# Patient Record
Sex: MALE | Race: WHITE | NOT HISPANIC OR LATINO | Employment: OTHER | URBAN - METROPOLITAN AREA
[De-identification: names, ages, dates, MRNs, and addresses within clinical notes are randomized per-mention and may not be internally consistent; named-entity substitution may affect disease eponyms.]

---

## 2018-09-12 ENCOUNTER — TRANSFERRED RECORDS (OUTPATIENT)
Dept: HEALTH INFORMATION MANAGEMENT | Facility: CLINIC | Age: 68
End: 2018-09-12

## 2019-12-16 ENCOUNTER — TRANSFERRED RECORDS (OUTPATIENT)
Dept: HEALTH INFORMATION MANAGEMENT | Facility: CLINIC | Age: 69
End: 2019-12-16

## 2020-06-02 ENCOUNTER — TRANSFERRED RECORDS (OUTPATIENT)
Dept: HEALTH INFORMATION MANAGEMENT | Facility: CLINIC | Age: 70
End: 2020-06-02

## 2021-03-29 ENCOUNTER — TRANSFERRED RECORDS (OUTPATIENT)
Dept: HEALTH INFORMATION MANAGEMENT | Facility: CLINIC | Age: 71
End: 2021-03-29

## 2022-06-25 ENCOUNTER — TRANSFERRED RECORDS (OUTPATIENT)
Dept: HEALTH INFORMATION MANAGEMENT | Facility: CLINIC | Age: 72
End: 2022-06-25

## 2022-07-25 ENCOUNTER — TRANSFERRED RECORDS (OUTPATIENT)
Dept: HEALTH INFORMATION MANAGEMENT | Facility: CLINIC | Age: 72
End: 2022-07-25

## 2022-08-31 ENCOUNTER — TRANSCRIBE ORDERS (OUTPATIENT)
Dept: OTHER | Age: 72
End: 2022-08-31

## 2022-08-31 DIAGNOSIS — R13.19 OTHER DYSPHAGIA: Primary | ICD-10-CM

## 2022-10-18 NOTE — TELEPHONE ENCOUNTER
FUTURE VISIT INFORMATION      FUTURE VISIT INFORMATION:    Date: 1/3/2023    Time: 8 AM     Location: [facility]   REFERRAL INFORMATION:    Referring provider:  Dr. Morenita Justice     Referring providers clinic:  [facility]     Reason for visit/diagnosis: Dysphagia    RECORDS REQUESTED FROM:       Clinic name Comments Records Status Imaging Status   [facility]      1/28/14 note from Ariel Menendez MD    Speech Pathology progress note  6/15/22   11/3/21  3/29/21 Received- Scanned under Media    Imaging XR esophagram swallow study 3/29/21 req 12/20/22 - report sent to scan 12/27/22  Pending req 12/20/22 12/20/22 7:48am Faxed request for swallow study imaging to [facility]

## 2022-12-26 NOTE — TELEPHONE ENCOUNTER
Records Requested   December 26, 2022 7:18 AM  [place] [address]   Facility  [facility]   Outcome Called and sent 2nd request for records and imaging push to  PACS. Provided my call back number -Luz Marina     Records Requested  RECEIVED December 27, 2022 4:30 PM  [place] [address]   Facility  [facility]   Outcome Received report for swallow study and progress notes for SLP. Sent to scanning. -Luz Marina    December 28, 2022 7:02 AM  Called facility film room. Left a message for imaging request previously sent to push to PACS. Left my call back number. -Luz Marina    December 30, 2022 1:08 PM  Check with  PACS and it appears that the imaging was resolved on 12/28/22 but the images are slow to load. An email was sent to to our Pacs Admin to see if the imaging is stuck somewhere in the system. It seem that not all the imaging is loading and the  first 10-11 frames are black.     Called the facility and left another voice message to resend or verify if the beginning is supposed to be blank. Call back number given. -Luz Marina

## 2023-01-03 ENCOUNTER — DOCUMENTATION ONLY (OUTPATIENT)
Dept: OTOLARYNGOLOGY | Facility: CLINIC | Age: 73
End: 2023-01-03

## 2023-01-03 ENCOUNTER — THERAPY VISIT (OUTPATIENT)
Dept: SPEECH THERAPY | Facility: CLINIC | Age: 73
End: 2023-01-03
Payer: COMMERCIAL

## 2023-01-03 ENCOUNTER — PRE VISIT (OUTPATIENT)
Dept: OTOLARYNGOLOGY | Facility: CLINIC | Age: 73
End: 2023-01-03

## 2023-01-03 ENCOUNTER — OFFICE VISIT (OUTPATIENT)
Dept: OTOLARYNGOLOGY | Facility: CLINIC | Age: 73
End: 2023-01-03
Payer: COMMERCIAL

## 2023-01-03 VITALS
BODY MASS INDEX: 40.74 KG/M2 | HEIGHT: 71 IN | SYSTOLIC BLOOD PRESSURE: 162 MMHG | WEIGHT: 291 LBS | DIASTOLIC BLOOD PRESSURE: 102 MMHG | HEART RATE: 71 BPM

## 2023-01-03 DIAGNOSIS — E66.01 MORBID OBESITY (H): ICD-10-CM

## 2023-01-03 DIAGNOSIS — R13.19 OTHER DYSPHAGIA: ICD-10-CM

## 2023-01-03 DIAGNOSIS — R13.12 OROPHARYNGEAL DYSPHAGIA: Primary | ICD-10-CM

## 2023-01-03 PROCEDURE — 92526 ORAL FUNCTION THERAPY: CPT | Mod: GN | Performed by: SPEECH-LANGUAGE PATHOLOGIST

## 2023-01-03 PROCEDURE — 99207 PR NO CHARGE LOS: CPT

## 2023-01-03 PROCEDURE — 99204 OFFICE O/P NEW MOD 45 MIN: CPT | Mod: 25 | Performed by: OTOLARYNGOLOGY

## 2023-01-03 PROCEDURE — 92610 EVALUATE SWALLOWING FUNCTION: CPT | Mod: GN | Performed by: SPEECH-LANGUAGE PATHOLOGIST

## 2023-01-03 PROCEDURE — 92613 ENDOSCOPY SWALLOW (FEES) I&R: CPT | Performed by: OTOLARYNGOLOGY

## 2023-01-03 PROCEDURE — 92612 ENDOSCOPY SWALLOW (FEES) VID: CPT | Mod: GN | Performed by: OTOLARYNGOLOGY

## 2023-01-03 RX ORDER — METOPROLOL TARTRATE 100 MG
100 TABLET ORAL 2 TIMES DAILY
COMMUNITY

## 2023-01-03 RX ORDER — LOSARTAN POTASSIUM 100 MG/1
100 TABLET ORAL DAILY
COMMUNITY
End: 2023-04-14

## 2023-01-03 ASSESSMENT — PAIN SCALES - GENERAL: PAINLEVEL: NO PAIN (0)

## 2023-01-03 NOTE — PROGRESS NOTES
Video Esophagram Review Rounds  Imaging Review of MBSS conducted with attending physician Christina Ang and reviewed/discussed with SLP Sade Blair, Kalli Oneal, Liliya Oleary, and/or Tiny Calderon    Relevant Background:      MBSS Date: 3/2021    Findings:  Pharyngeal Weakness:No  Epiglottic dysfunction: Yes  Penetration: Yes  Aspiration: No  UES dysfunction: No  Details: osteophyte with epiglottic dysfunction       Recommendations:  Diet: current   Needs repeat Xray Video Swallow Exam

## 2023-01-03 NOTE — PROGRESS NOTES
[facility]    Patient: Bird Kaur  Date of Service: 1/3/2023    HISTORY  PATIENT INFORMATION  Bird Kaur was seen for brief consultation in conjunction with a visit to Dr. Ang today.  It was determined that no swallowing assessment was indicated.    No charge for today s session;   NO CHARGE FACILITY FEE (68039)    Bernard Naylor, Ph.D., CCC-SLP  , Otolaryngology  Speech-Language Pathologist-[facility]  [phone]  he/him/his

## 2023-01-03 NOTE — PROGRESS NOTES
[facility]   at the [facility]   Otolaryngology Clinic     Patient: Bird Kaur    MRN: [id number]    : 1950    Age/Gender: 72 year old male  Date of Service: 1/3/2023  Rendering Provider:   Christina Ang MD     Referring Provider   PCP: No primary care provider on file.  Referring Physician: Dr. Morenita Justice     Reason for Consultation   Dysphagia  History   HISTORY OF PRESENT ILLNESS: Mr. Kaur is a 72 year old male who presents to us today with dysphagia.      Of note he has a large anterior osteophyte at the C3-4 level 3-4 years ago. He is to undergo cervical spinal surgery in February.     he presents today for evaluation with his wife Tatyana. he reports:    Dysphonia  - used to be a rodriguez   - no longer able to sing  - history of voice therapy    Dysphagia  - ongoing swallow issues for some time  - cold water hurts the posterior neck  - soft foods such as pudding get stuck in the throat  - upcoming cervical surgery in February for arthritis vs complications of his spur on swallowing  - limited range of motion   - residual right ulnar nerve pain   - his wife reports that he gags 3-4 x every meal   - did have swallow and voice therapy in [place]   - he is physically unable to tuck his chin due to pain  - does report systemic weakness and tremor  - has been seen by neurology for this  - denies recent pneumonias   - had Covid-19 in November  - history of sinus problems  - history of asthma with seasonal allergies  - uses Flonase    Dyspnea  - denies    Throat clearing/cough  - clears throat around and after meals    GERD/LPRD   - denies   - was on PPI prophylactic but discontinued    PAST MEDICAL HISTORY: No past medical history on file.    PAST SURGICAL HISTORY: No past surgical history on file.    CURRENT MEDICATIONS: No current outpatient medications on file.    ALLERGIES: Patient has no allergy information on record.    SOCIAL HISTORY:    Social History      Socioeconomic History     Marital status: Patient Declined     Spouse name: Not on file     Number of children: Not on file     Years of education: Not on file     Highest education level: Not on file   Occupational History     Not on file   Tobacco Use     Smoking status: Not on file     Smokeless tobacco: Not on file   Substance and Sexual Activity     Alcohol use: Not on file     Drug use: Not on file     Sexual activity: Not on file   Other Topics Concern     Not on file   Social History Narrative     Not on file     Social Determinants of Health     Financial Resource Strain: Not on file   Food Insecurity: Not on file   Transportation Needs: Not on file   Physical Activity: Not on file   Stress: Not on file   Social Connections: Not on file   Intimate Partner Violence: Not on file   Housing Stability: Not on file         FAMILY HISTORY: No family history on file.  Non-contributory for problems with anesthesia    REVIEW OF SYSTEMS:   The patient was asked a 14 point review of systems regarding constitutional symptoms, eye symptoms, ears, nose, mouth, throat symptoms, cardiovascular symptoms, respiratory symptoms, gastrointestinal symptoms, genitourinary symptoms, musculoskeletal symptoms, integumentary symptoms, neurological symptoms, psychiatric symptoms, endocrine symptoms, hematologic/lymphatic symptoms, and allergic/ immunologic symptoms.   The pertinent factors have been included in the HPI and below.  Patient Supplied Answers to Review of Systems  No flowsheet data found.    Physical Examination   The patient underwent a physical examination as described below. The pertinent positive and negative findings are summarized after the description of the examination.  Constitutional: The patient's developmental and nutritional status was assessed. The patient's voice quality was assessed.  Head and Face: The head and face were inspected for deformities. The sinuses were palpated. The salivary glands were  palpated. Facial muscle strength was assessed bilaterally.  Eyes: Extraocular movements and primary gaze alignment were assessed.  Ears, Nose, Mouth and Throat: The ears and nose were examined for deformities. The nasal septum, mucosa, and turbinates were inspected by anterior rhinoscopy. The lips, teeth, and gums were examined for abnormalities. The oral mucosa, tongue, palate, tonsils, lateral and posterior pharynx were inspected for the presence of asymmetry or mucosal lesions.    Neck: The tracheal position was noted, and the neck mass palpated to determine if there were any asymmetries, abnormal neck masses, thyromegally, or thyroid nodules.  Respiratory: The nature of the breathing and chest expansion/symmetry was observed.  Cardiovascular: The patient was examined to determine the presence of any edema or jugular venous distension.  Abdomen: The contour of the abdomen was noted.  Lymphatic: The patient was examined for infraclavicular lymphadenopathy.  Musculoskeletal: The patient was inspected for the presence of skeletal deformities.  Extremities: The extremities were examined for any clubbing or cyanosis.  Skin: The skin was examined for inflammatory or neoplastic conditions.  Neurologic: The patient's orientation, mood, and affect were noted. The cranial nerve  functions were examined.  Other pertinent positive and negative findings on physical examination:      OC/OP: no lesions, uvula midline, soft palate elevates symmetrically   Neck: no lesions, no TH tenderness to palpation  All other physical examination findings were within normal limits and noncontributory.  Procedures   Flexible laryngoscopy (CPT 96721)      Pre-procedure diagnosis: Dysphagia  Post-procedure diagnosis: same as above  Indication for procedure: Mr. Kaur is a 72 year old male with see above  Procedure(s): Fiberoptic Laryngoscopy    Details of Procedure: After informed consent was obtained, the patient was seated in the  examination chair.  The areas of the nasopharynx as well as the hypopharynx were anesthetized with topical 4% lidocaine with 0.25% phenylephrine atomizer.  Examination of the base of tongue was performed first.  Attention was directed to any evidence of masses in the area or evidence of leukoplakia or candidal infection.  Attention was directed to the epiglottis where its size and position was determined and its movement on phonation of the vowel  e .  The piriform sinuses were then inspected for any mass lesions or pooling of secretions.  Attention was then directed to the larynx. The vocal folds were inspected for infection or any areas of leukoplakia, for masses, polypoid degeneration, or hemorrhage.  Having done this, the arytenoids and vocal processes were inspected for erythema or evidence of granuloma formation.  The posterior commissure was then inspected for evidence of inflammatory changes in the mucosa and heaping up of mucosal tissue. The patient was then instructed to say the vowel  e .  Adduction of vocal folds to the midline was observed for any evidence of paresis or paralysis of the larynx or asymmetry in rotation of the larynx to the left or right. The patient was asked to breathe and the degree of abduction was noted bilaterally.  Subglottic view of the larynx was obtained for any additional mass lesions or mucosal changes.  Finally the post cricoid was examined for evidence of pooling of secretions, as well as the pharyngeal wall mucosa.   Anesthesia type: 0.25% phenylephrine    Findings:  Anatomic/physiological deviations: RNC, osteophyte above the arytenoids, no pooling of saliva, dry secretions   Right vocal process: No restriction of mobility   Left vocal process: No restriction of mobility  Glottal gap: Complete glottal closure  Supraglottic structures: Normal  Hypopharynx: Normal     Estimated Blood Loss: minimal  Complications: None  Disposition: Patient tolerated the procedure well            Fiberoptic Endoscopic Evaluation of Swallowing (CPT 20871)  and Interpretation of Swallowing (CPT 94908)    Indications: See above notes for complete history and physical. Patient complains of dysphagia to both solids and liquids and/or there is suggestion on history and endoscopic exam of the presence of dysphagia causing medical complaints.  Swallowing evaluation is being performed to assess the presence and degree of dysphagia, and to recommend a safe diet.     Pulmonary Status:  No PNA   Current Diet:              regular                                        thin liquids      Consistency Amounts:  Thin Liquid: sip   Puree: 1 tsp  Solid: cookies            Positioning: upright in a chair  Oral Peripheral Exam: See physical exam section.  Anatomic Notes: See Videostroboscopy report for assessment of anatomy and laryngeal functioning  Pharyngeal secretions prior to administration of liquid or food: Yes  Oral Phase Abnormal Findings: No abnormal behavior observed  Behavioral Adaptations: No abnormal behavior observed  Pharyngeal Phase Abnormal Findings: penetration with thins, mild pharyngeal wall residue with puree and solids , with significant throat clearing throughout the entire exam    Recommended Diet:  regular                                        thin liquids              Review of Relevant Clinical Data   I personally reviewed:  Notes:   [facility]          Radiology:   The images were reviewed and interpreted of Xray Video Swallow Exam  3/29/21          Procedures:     Labs:  No results found for: TSH  No results found for: NA, CO2, BUN, CREAT, GLUCOSE, PHOS  No results found for: WBC, HGB, HCT, MCV, PLT  No results found for: PT, PTT, INR  No results found for: INDU  No components found for: RHEUMATOIDFACTOR,  RF  No results found for: CRP  No components found for: CKTOT, URICACID  No components found for: C3, C4, DSDNAAB, NDNAABIFA  No results found for: MPOAB    Patient reported  Quality of Life (QOL) Measures   Patient Supplied Answers To VHI Questionnaire  No flowsheet data found.      Patient Supplied Answers To EAT Questionnaire  No flowsheet data found.      Patient Supplied Answers To CSI Questionnaire  No flowsheet data found.      Patient Supplied Answers to Dyspnea Index Questionnaire:  No flowsheet data found.    Impression & Plan     IMPRESSION: Mr. Kaur is a 72 year old male who is being seen for the followin. 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 Erwin's office  - throat clearing likely due to some hypersensitivity with swallowing dysfunction as given today it only started after he started eating   - will need to [facility] to discuss his case further prior to surgery   Plan  - Xray Video Swallow Exam   - obtain information of [facility], wife will call me with name  - swallow therapy     RETURN VISIT: after testing    Thank you for the kind referral and for allowing me to share in the care of Mr. Kaur. If you have any questions, please do not hesitate to  contact me.    Scribe Disclosure:  I, Deion Pineda, am serving as a scribe to document services personally performed by Christina Ang MD based on data collection and the provider's statements to me.     Christina Ang MD    Laryngology    [facility]  Department of  Otolaryngology - Head and Neck Surgery  Clinics & Surgery Center  [address] [place] [id number]  Appointment line: [phone]  Fax: [phone]  [email or web address]

## 2023-01-03 NOTE — LETTER
1/3/2023       RE: Bird Kaur  [address] [address]     Dear Colleague,    Thank you for referring your patient, Bird Kaur, to the [facility] EAR NOSE AND THROAT [facility]INIC [place] at [facility]. Please see a copy of my visit note below.        [facility]   at the [facility]   Otolaryngology Clinic     Patient: Bird Kaur    MRN: [id number]    : 1950    Age/Gender: 72 year old male  Date of Service: 1/3/2023  Rendering Provider:   Christina Ang MD     Referring Provider   PCP: No primary care provider on file.  Referring Physician: Dr. Morenita Justice     Reason for Consultation   Dysphagia  History   HISTORY OF PRESENT ILLNESS: Mr. Kaur is a 72 year old male who presents to us today with dysphagia.      Of note he has a large anterior osteophyte at the C3-4 level 3-4 years ago. He is to undergo cervical spinal surgery in February.     he presents today for evaluation with his wife Tatyana. he reports:    Dysphonia  - used to be a rodriguez   - no longer able to sing  - history of voice therapy    Dysphagia  - ongoing swallow issues for some time  - cold water hurts the posterior neck  - soft foods such as pudding get stuck in the throat  - upcoming cervical surgery in February for arthritis vs complications of his spur on swallowing  - limited range of motion   - residual right ulnar nerve pain   - his wife reports that he gags 3-4 x every meal   - did have swallow and voice therapy in [place]   - he is physically unable to tuck his chin due to pain  - does report systemic weakness and tremor  - has been seen by neurology for this  - denies recent pneumonias   - had Covid-19 in November  - history of sinus problems  - history of asthma with seasonal allergies  - uses Flonase    Dyspnea  - denies    Throat clearing/cough  - clears throat around and after meals    GERD/LPRD   -  denies   - was on PPI prophylactic but discontinued    PAST MEDICAL HISTORY: No past medical history on file.    PAST SURGICAL HISTORY: No past surgical history on file.    CURRENT MEDICATIONS: No current outpatient medications on file.    ALLERGIES: Patient has no allergy information on record.    SOCIAL HISTORY:    Social History     Socioeconomic History     Marital status: Patient Declined     Spouse name: Not on file     Number of children: Not on file     Years of education: Not on file     Highest education level: Not on file   Occupational History     Not on file   Tobacco Use     Smoking status: Not on file     Smokeless tobacco: Not on file   Substance and Sexual Activity     Alcohol use: Not on file     Drug use: Not on file     Sexual activity: Not on file   Other Topics Concern     Not on file   Social History Narrative     Not on file     Social Determinants of Health     Financial Resource Strain: Not on file   Food Insecurity: Not on file   Transportation Needs: Not on file   Physical Activity: Not on file   Stress: Not on file   Social Connections: Not on file   Intimate Partner Violence: Not on file   Housing Stability: Not on file         FAMILY HISTORY: No family history on file.  Non-contributory for problems with anesthesia    REVIEW OF SYSTEMS:   The patient was asked a 14 point review of systems regarding constitutional symptoms, eye symptoms, ears, nose, mouth, throat symptoms, cardiovascular symptoms, respiratory symptoms, gastrointestinal symptoms, genitourinary symptoms, musculoskeletal symptoms, integumentary symptoms, neurological symptoms, psychiatric symptoms, endocrine symptoms, hematologic/lymphatic symptoms, and allergic/ immunologic symptoms.   The pertinent factors have been included in the HPI and below.  Patient Supplied Answers to Review of Systems  No flowsheet data found.    Physical Examination   The patient underwent a physical examination as described below. The pertinent  positive and negative findings are summarized after the description of the examination.  Constitutional: The patient's developmental and nutritional status was assessed. The patient's voice quality was assessed.  Head and Face: The head and face were inspected for deformities. The sinuses were palpated. The salivary glands were palpated. Facial muscle strength was assessed bilaterally.  Eyes: Extraocular movements and primary gaze alignment were assessed.  Ears, Nose, Mouth and Throat: The ears and nose were examined for deformities. The nasal septum, mucosa, and turbinates were inspected by anterior rhinoscopy. The lips, teeth, and gums were examined for abnormalities. The oral mucosa, tongue, palate, tonsils, lateral and posterior pharynx were inspected for the presence of asymmetry or mucosal lesions.    Neck: The tracheal position was noted, and the neck mass palpated to determine if there were any asymmetries, abnormal neck masses, thyromegally, or thyroid nodules.  Respiratory: The nature of the breathing and chest expansion/symmetry was observed.  Cardiovascular: The patient was examined to determine the presence of any edema or jugular venous distension.  Abdomen: The contour of the abdomen was noted.  Lymphatic: The patient was examined for infraclavicular lymphadenopathy.  Musculoskeletal: The patient was inspected for the presence of skeletal deformities.  Extremities: The extremities were examined for any clubbing or cyanosis.  Skin: The skin was examined for inflammatory or neoplastic conditions.  Neurologic: The patient's orientation, mood, and affect were noted. The cranial nerve  functions were examined.  Other pertinent positive and negative findings on physical examination:      OC/OP: no lesions, uvula midline, soft palate elevates symmetrically   Neck: no lesions, no TH tenderness to palpation  All other physical examination findings were within normal limits and noncontributory.  Procedures    Flexible laryngoscopy (CPT 76657)      Pre-procedure diagnosis: Dysphagia  Post-procedure diagnosis: same as above  Indication for procedure: Mr. Kaur is a 72 year old male with see above  Procedure(s): Fiberoptic Laryngoscopy    Details of Procedure: After informed consent was obtained, the patient was seated in the examination chair.  The areas of the nasopharynx as well as the hypopharynx were anesthetized with topical 4% lidocaine with 0.25% phenylephrine atomizer.  Examination of the base of tongue was performed first.  Attention was directed to any evidence of masses in the area or evidence of leukoplakia or candidal infection.  Attention was directed to the epiglottis where its size and position was determined and its movement on phonation of the vowel  e .  The piriform sinuses were then inspected for any mass lesions or pooling of secretions.  Attention was then directed to the larynx. The vocal folds were inspected for infection or any areas of leukoplakia, for masses, polypoid degeneration, or hemorrhage.  Having done this, the arytenoids and vocal processes were inspected for erythema or evidence of granuloma formation.  The posterior commissure was then inspected for evidence of inflammatory changes in the mucosa and heaping up of mucosal tissue. The patient was then instructed to say the vowel  e .  Adduction of vocal folds to the midline was observed for any evidence of paresis or paralysis of the larynx or asymmetry in rotation of the larynx to the left or right. The patient was asked to breathe and the degree of abduction was noted bilaterally.  Subglottic view of the larynx was obtained for any additional mass lesions or mucosal changes.  Finally the post cricoid was examined for evidence of pooling of secretions, as well as the pharyngeal wall mucosa.   Anesthesia type: 0.25% phenylephrine    Findings:  Anatomic/physiological deviations: RNC, osteophyte above the arytenoids, no pooling of  saliva, dry secretions   Right vocal process: No restriction of mobility   Left vocal process: No restriction of mobility  Glottal gap: Complete glottal closure  Supraglottic structures: Normal  Hypopharynx: Normal     Estimated Blood Loss: minimal  Complications: None  Disposition: Patient tolerated the procedure well           Fiberoptic Endoscopic Evaluation of Swallowing (CPT 36804)  and Interpretation of Swallowing (CPT 44384)    Indications: See above notes for complete history and physical. Patient complains of dysphagia to both solids and liquids and/or there is suggestion on history and endoscopic exam of the presence of dysphagia causing medical complaints.  Swallowing evaluation is being performed to assess the presence and degree of dysphagia, and to recommend a safe diet.     Pulmonary Status:  No PNA   Current Diet:              regular                                        thin liquids      Consistency Amounts:  Thin Liquid: sip   Puree: 1 tsp  Solid: cookies            Positioning: upright in a chair  Oral Peripheral Exam: See physical exam section.  Anatomic Notes: See Videostroboscopy report for assessment of anatomy and laryngeal functioning  Pharyngeal secretions prior to administration of liquid or food: Yes  Oral Phase Abnormal Findings: No abnormal behavior observed  Behavioral Adaptations: No abnormal behavior observed  Pharyngeal Phase Abnormal Findings: penetration with thins, mild pharyngeal wall residue with puree and solids , with significant throat clearing throughout the entire exam    Recommended Diet:  regular                                        thin liquids              Review of Relevant Clinical Data   I personally reviewed:  Notes:   [facility]          Radiology:   The images were reviewed and interpreted of Xray Video Swallow Exam  3/29/21          Procedures:     Labs:  No results found for: TSH  No results found for: NA, CO2, BUN, CREAT, GLUCOSE, PHOS  No  results found for: WBC, HGB, HCT, MCV, PLT  No results found for: PT, PTT, INR  No results found for: INDU  No components found for: RHEUMATOIDFACTOR,  RF  No results found for: CRP  No components found for: CKTOT, URICACID  No components found for: C3, C4, DSDNAAB, NDNAABIFA  No results found for: MPOAB    Patient reported Quality of Life (QOL) Measures   Patient Supplied Answers To VHI Questionnaire  No flowsheet data found.      Patient Supplied Answers To EAT Questionnaire  No flowsheet data found.    Patient Supplied Answers To CSI Questionnaire  No flowsheet data found.    Patient Supplied Answers to Dyspnea Index Questionnaire:  No flowsheet data found.    Impression & Plan     IMPRESSION: Mr. Kaur is a 72 year old male who is being seen for the followin. 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 Erwin's office  - throat clearing likely due to some hypersensitivity with swallowing dysfunction as given today it only started after he started eating   - will need to [facility] to discuss his case  further prior to surgery   Plan  - Xray Video Swallow Exam   - [facility], wife will call me with name  - swallow therapy     RETURN VISIT: after testing    Thank you for the kind referral and for allowing me to share in the care of Mr. Kaur. If you have any questions, please do not hesitate to contact me.    Scribe Disclosure:  I, Deion Pineda, am serving as a scribe to document services personally performed by Christina Ang MD based on data collection and the provider's statements to me.       Again, thank you for allowing me to participate in the care of your patient.      Sincerely,    Christina Ang MD

## 2023-01-03 NOTE — PROGRESS NOTES
OUTPATIENT SWALLOW  EVALUATION  PLAN OF TREATMENT FOR OUTPATIENT REHABILITATION  (COMPLETE FOR INITIAL CLAIMS ONLY)  Patient's Last Name, First Name, M.I.  YOB: 1950  Bird Kaur     Provider's Name   FEDE Jean Baptiste   Medical Record No.  [id number]     Start of Care Date:  01/03/23   Onset Date:  01/03/23   Type:     ___PT   ____OT  ___X_SLP Medical Diagnosis:  Dysphagia     Treatment Diagnosis:  Mild to moderate oropharyngeal dysphagia Visits from SOC:  1     _________________________________________________________________________________  Plan of Treatment/Functional Goals:  Planned Therapy Interventions: Dysphagia Treatment  Dysphagia treatment: Oropharyngeal exercise training, Instruction of safe swallow strategies          Goals   1. Goal Identifier: 1       Goal Description: Pt will independently complete oropharyngeal strengthening exercises in sets of ten 3-5x/day.       Target Date: 04/03/23           2. Goal Identifier: 2       Goal Description: Pt will independently implement aspiration precautions and safe swallow strategies on 4 out of 5 opportunities.       Target Date: 04/03/23           3. Goal Identifier: 3       Goal Description: Pt will complete a VFSS before 2/3/2023.       Target Date: 04/03/23                  Therapy Frequency: other (see comments) (Monthly)  Predicted Duration of Therapy Intervention (days/wks): up to 3 months    Kalli Oneal, SLP       I CERTIFY THE NEED FOR THESE SERVICES FURNISHED UNDER        THIS PLAN OF TREATMENT AND WHILE UNDER MY CARE     (Physician attestation of this document indicates review and certification of the therapy plan).                  Certification date from: 01/03/23 Certification date to: 04/03/23          Referring Physician: Christina Ang MD    Initial Assessment        See Epic Evaluation Start Of Care Date: 01/03/23

## 2023-01-03 NOTE — PROGRESS NOTES
Speech-Language Pathology Department   EVALUATION  [facility]  CLINICAL SWALLOW EVALUATION WITH ENDOSCOPIC VISUALIZATION VIA MD SCOPE      01/03/23 0700       Present No   General Information   Type Of Visit Initial   Start Of Care Date 01/03/23   Referring Physician Christina Ang MD   Orders Evaluate And Treat   Orders Comment Clinical swallow study   Medical Diagnosis Dysphagia   Onset Of Illness/injury Or Date Of Surgery 01/03/23   Precautions/limitations No Known Precautions/limitations   Hearing Sensorineural loss noted but WFL for conversational speech production   Pertinent History of Current Problem/OT: Additional Occupational Profile Info Pt is a 72 year old male with a PMH including dysphagia, asthma, sleep apnea, obsety, chronic and allergic rhinitis who presents to ENT clinic due to difficulty with swallowing.  Pt has had VFSS x2 in 2021 resulting in the recommendation for a regular texture diet and thin liquids.  Of note, a large osteophyte was observed at C3-C4 and reported to limit epiglottic inversion.  Pt reports this date that he will be having surgery in early February which he and his wife report will address the bone spurs in his neck.  Pt reports worsening swallow function.  He reports a sense of things getting stuck; pt notes that this can occur on anything.  Wife reports that he gags and chokes 3-4x/meal and that this occurs every meal.  Pt denies PNA.  Pt denies reflux but does note that he is on reflux medicaiton.   Respiratory Status Room air   Prior Level Of Function Swallowing   Prior Level Of Function Comment Regular textures and thin liquids   Living Environment [facility]   General Observations Pt is pleasant and cooperative.   Patient/family Goals Goals not formally stated at the time of evaluation.   General Information Comments Clinical swallow evaluation completed with endoscopic visualization via MD  scope.   Clinical Swallow Evaluation   Oral Musculature generally intact   Structural Abnormalities none present   Dentition present and adequate   Mucosal Quality adequate   Oral Labial Strength and Mobility WFL   Lingual Strength and Mobility WFL   Laryngeal Function Cough;Throat clear;Swallow;Voicing initiated   Additional Documentation Yes   Additional evaluation(s) completed today Recommended   Rationale for completing additional evaluation View pharyngeal phase and r/o aspiration in the setting of penetration and residue.   Swallow Eval   Feeding Assistance no assistance needed   Clinical Swallow Eval: Thin Liquid Texture Trial   Mode of Presentation, Thin Liquids cup;self-fed;straw   Volume of Liquid or Food Presented 2 oz thin liquid   Oral Phase of Swallow WFL   Pharyngeal Phase of Swallow feeling of something stuck in throat;repeated swallows;throat clearing;impaired   Successful Strategies Trialed During Procedure other (see comments)  (Unable to tuck chin due to prior surgery)   Diagnostic Statement Penetration resulting in immediate coughing and throat clearing which cleared the laryngeal vestibule.  No aspiration.  Mild residue remaining in the valleculae and lateral chanels.  Of note, pt denies a sense of things going down the wrong way or remaining in the throat but that the liquid is trying to go up his nose.   Clinical Swallow Eval: Puree Solid Texture Trial   Mode of Presentation, Puree spoon;self-fed   Volume of Puree Presented 1 tbsp bites x2   Oral Phase, Puree Premature pharyngeal entry   Pharyngeal Phase, Puree impaired;repeated swallows;throat clearing   Diagnostic Statement Penetration.  No aspiration.  Moderate residue remaining in the valleculae, lateral chanels and along the phayngeal wall.  Excessive throat clearing and pt noted to elicit multiple swallows in attempt to clear.   Clinical Swallow Eval: Regular (Solid)   Mode of Presentation self-fed   Volume Presented 1 Jeny Dodayami  cookie   Oral Phase Residue in oral cavity;Premature pharyngeal entry   Pharyngeal Phase feeling of something stuck in throat;repeated swallows;throat clearing;impaired   Diagnostic Statement No penetration or aspiration. Mild pharyngeal residue in the valleculae and pharyngeal wall.  Throat clearing elicited.   Swallow Compensations   Swallow Compensations Pacing;Reduce amounts;Alternate viscosity of consistencies   Educational Assessment   Barriers to Learning No barriers   Esophageal Phase of Swallow   Patient reports or presents with symptoms of esophageal dysphagia No   General Therapy Interventions   Planned Therapy Interventions Dysphagia Treatment   Dysphagia treatment Oropharyngeal exercise training;Instruction of safe swallow strategies   Swallow Eval: Clinical Impressions   Skilled Criteria for Therapy Intervention Skilled criteria met.  Treatment indicated.   Functional Assessment Scale (FAS) 4   Treatment Diagnosis Mild to moderate oropharyngeal dysphagia   Diet texture recommendations Regular diet;Thin liquids (level 0)   Recommended Feeding/Eating Techniques alternate between small bites and sips of food/liquid;maintain upright posture during/after eating for 30 mins;small sips/bites   Rehab Potential good, to achieve stated therapy goals   Therapy Frequency other (see comments)  (Monthly)   Predicted Duration of Therapy Intervention (days/wks) up to 3 months   Anticipated Discharge Disposition home   Risks and Benefits of Treatment have been explained. Yes   Patient, family and/or staff in agreement with Plan of Care Yes   Clinical Impression Comments Pt presents with mild to moderate oropharyngeal dysphagia in the setting of weakness, osteophyte obstruction, and hypersensitivity.  Pt demonstrated penetration on thin liquids and pureed textures.  Pt was able to clear penetrated material from the laryngeal vestibule and there was no aspiration.  Pt exhibited mild to moderate pharyngeal residue which  resulted in excessive throat clearing and multiple swallows.  At this time, pt is at increased risk for aspiration due to current level of swallow skills.  Pt is also planning to undergo a surgery in early February for the managment of cerivical osteophytes which will likely further impact his swallow function.  It is recommended that pt cautiously continue with a regular texture diet and thin liquids.  Pt should be upright for intake and pace self, take small bites/sips, and alternate consistencies.  Pt should also participate in pharyngeal strengthening exercises to maximize safety and ease of swallowing in the setting of upcoming surgery.   Swallow Goals   SLP Swallow Goals 1;2;3   Swallow Goal 1   Goal Identifier 1   Goal Description Pt will independently complete oropharyngeal strengthening exercises in sets of ten 3-5x/day.   Target Date 04/03/23   Swallow Goal 2   Goal Identifier 2   Goal Description Pt will independently implement aspiration precautions and safe swallow strategies on 4 out of 5 opportunities.   Target Date 04/03/23   Swallow Goal 3   Goal Identifier 3   Goal Description Pt will complete a VFSS before 2/3/2023.   Target Date 04/03/23   Total Session Time   SLP Eval: oral/pharyngeal swallow function, clinical minutes (83999) 30   Total Evaluation Time 30   Therapy Certification   Certification date from 01/03/23   Certification date to 04/03/23   Medical Diagnosis Dysphagia   Certification I certify the need for these services furnished under this plan of treatment and while under my care.  (Physician co-signature of this document indicates review and certification of the therapy plan).     Thank you for the referral of Bird Kaur.  If you have any questions about this report, please contact me using the information below.        Kalli Oneal MS CCC-SLP  Speech Language Pathologist   Clinical Specialist Level 3    [facility]  Dept. of  Otolaryngology  Department of Rehabilitation services  [address] [place] [address]    Email: [email or web address]  Voicemail: [phone]  Gender Pronouns: she/her  Employed by [employer]

## 2023-01-03 NOTE — PATIENT INSTRUCTIONS
1. Please follow-up with an Xray video swallow study   2. Please call the ENT clinic with any questions,concerns, new or worsening symptoms.    -Clinic number is [phone]   - [facility] direct line (Dr. Ang's nurse) [phone]

## 2023-01-12 ENCOUNTER — ANCILLARY PROCEDURE (OUTPATIENT)
Dept: GENERAL RADIOLOGY | Facility: CLINIC | Age: 73
End: 2023-01-12
Attending: OTOLARYNGOLOGY
Payer: COMMERCIAL

## 2023-01-12 ENCOUNTER — THERAPY VISIT (OUTPATIENT)
Dept: SPEECH THERAPY | Facility: CLINIC | Age: 73
End: 2023-01-12
Payer: COMMERCIAL

## 2023-01-12 ENCOUNTER — DOCUMENTATION ONLY (OUTPATIENT)
Dept: OTOLARYNGOLOGY | Facility: CLINIC | Age: 73
End: 2023-01-12

## 2023-01-12 DIAGNOSIS — R13.12 OROPHARYNGEAL DYSPHAGIA: ICD-10-CM

## 2023-01-12 PROCEDURE — 92611 MOTION FLUOROSCOPY/SWALLOW: CPT | Mod: GN | Performed by: SPEECH-LANGUAGE PATHOLOGIST

## 2023-01-12 PROCEDURE — 74230 X-RAY XM SWLNG FUNCJ C+: CPT | Mod: GC | Performed by: RADIOLOGY

## 2023-01-12 RX ORDER — DOFETILIDE 0.5 MG/1
500 CAPSULE ORAL 2 TIMES DAILY
COMMUNITY

## 2023-01-12 RX ORDER — TROSPIUM CHLORIDE 20 MG/1
20 TABLET, FILM COATED ORAL
COMMUNITY

## 2023-01-12 RX ORDER — FUROSEMIDE 40 MG
40 TABLET ORAL DAILY
COMMUNITY

## 2023-01-12 RX ORDER — LOVASTATIN 20 MG
20 TABLET ORAL AT BEDTIME
COMMUNITY

## 2023-01-12 RX ORDER — FLUTICASONE PROPIONATE 50 MCG
1 SPRAY, SUSPENSION (ML) NASAL DAILY PRN
COMMUNITY

## 2023-01-12 RX ORDER — MONTELUKAST SODIUM 10 MG/1
10 TABLET ORAL AT BEDTIME
COMMUNITY

## 2023-01-12 RX ORDER — FINASTERIDE 5 MG/1
5 TABLET, FILM COATED ORAL DAILY
COMMUNITY

## 2023-01-12 RX ORDER — POTASSIUM CHLORIDE 1500 MG/1
TABLET, EXTENDED RELEASE ORAL
COMMUNITY

## 2023-01-12 RX ORDER — BARIUM SULFATE 400 MG/ML
SUSPENSION ORAL ONCE
Status: COMPLETED | OUTPATIENT
Start: 2023-01-12 | End: 2023-01-12

## 2023-01-12 RX ORDER — CARBOXYMETHYLCELLULOSE SODIUM 5 MG/ML
1 SOLUTION/ DROPS OPHTHALMIC 4 TIMES DAILY PRN
COMMUNITY

## 2023-01-12 RX ADMIN — BARIUM SULFATE: 400 SUSPENSION ORAL at 10:08

## 2023-01-12 NOTE — PROGRESS NOTES
Video Esophagram Review Rounds  Imaging Review of MBSS conducted with attending physician Christina Ang and reviewed/discussed with SLP Sade Blair, Kalli Oneal, Liliya Oleary, and/or Tiny Calderon    Relevant Background:      MBSS Date: 1/12/23    Findings:  Pharyngeal Weakness:No  Epiglottic dysfunction: Yes  Penetration: Yes  Aspiration: No  UES dysfunction: No  Details: cervical osteophyte causing vallecular and pharyngeal residue      Recommendations:  Diet: current  Consider osteophyte surgery

## 2023-01-13 NOTE — PROGRESS NOTES
OUTPATIENT SWALLOW  EVALUATION  PLAN OF TREATMENT FOR OUTPATIENT REHABILITATION  (COMPLETE FOR INITIAL CLAIMS ONLY)  Patient's Last Name, First Name, M.I.  YOB: 1950  Bird Kaur     Provider's Name   FEDE Canales   Medical Record No.  [id number]     Start of Care Date:  01/12/23   Onset Date:      Type:     ___PT   ____OT  ___X_SLP Medical Diagnosis:  Oropharyngeal dysphagia     Treatment Diagnosis:  Mild/moderate oropharyngeal dysphagia Visits from SOC:  1     _________________________________________________________________________________  Plan of Treatment/Functional Goals:  Planned Therapy Interventions: Dysphagia Treatment  Dysphagia treatment: Oropharyngeal exercise training, Modified diet education, Instruction of safe swallow strategies                     Goals   1. Goal Identifier: Exercise       Goal Description: Pt will independently complete oropharyngeal strengthening exercises in sets of ten 3-5x/day.       Target Date: 04/12/23           2. Goal Identifier: Safe Swallow Strategies       Goal Description: Pt will independently implement aspiration precautions and safe swallow strategies on 4 out of 5 opportunities.       Target Date: 04/12/23           3. Goal Identifier: PO       Goal Description: Pt will tolerate least restrictive diet with no overt clinical s/sx of penetration/aspiration in 100% of PO trials.       Target Date: 04/12/23                                 FEDE Canales       I CERTIFY THE NEED FOR THESE SERVICES FURNISHED UNDER        THIS PLAN OF TREATMENT AND WHILE UNDER MY CARE     (Physician attestation of this document indicates review and certification of the therapy plan).                  Certification date from: 01/12/23 Certification date to: 04/12/23          Referring Physician: Dr. Christina Ang    Initial Assessment        See Epic Evaluation Start Of Care  Date: 01/12/23

## 2023-01-13 NOTE — PROGRESS NOTES
Speech-Language Pathology Department   EVALUATION  [facility]  Video Swallow Study Evaluation    01/12/23 1000   General Information   Type Of Visit Initial   Start Of Care Date 01/12/23   Referring Physician Dr. Christina Ang   Orders Evaluate And Treat   Orders Comment Video Swallow Study   Medical Diagnosis Oropharyngeal dysphagia   Precautions/limitations No Known Precautions/limitations   Hearing Sensorineural loss noted but WFL for conversational speech production   Pertinent History of Current Problem/OT: Additional Occupational Profile Info Pt is a 72 year old male with a PMH including dysphagia, asthma, sleep apnea, obsety, chronic and allergic rhinitis.  Pt has had VFSS x2 in 2021 resulting in the recommendation for a regular texture diet and thin liquids.  Of note, a large osteophyte was observed at C3-C4 and reported to limit epiglottic inversion. Pt previously seen in ENT clinic and stated he will be having surgery soon to address the bone spurs in his neck. He presents today for video swallow study evaluation accompanied by his wife. They are both very frustrated with the his ability to eat/drink safely without choking.   Respiratory Status Room air   Prior Level Of Function Swallowing   Prior Level Of Function Comment Regular textures and thin liquids   Living Environment [place]   General Observations Pt highly pleasant and cooperative throughout evaluation   Patient/family Goals To swallow without choking, coughing and throat clearing   Clinical Swallow Evaluation   Oral Musculature generally intact   Structural Abnormalities none present   Dentition present and adequate   Mucosal Quality adequate   Mandibular Strength and Mobility intact   Oral Labial Strength and Mobility WFL   Lingual Strength and Mobility WFL   Velar Elevation intact   Buccal Strength and Mobility intact   Laryngeal Function Cough;Throat clear;Swallow;Voicing initiated    VFSS Evaluation   VFSS Additional Documentation Yes   VFSS Eval: Radiology   Radiologist Resident   Views Taken left lateral;A/P   Physical Location of Procedure [facility]   VFSS Eval: Thin Liquid Texture Trial   Mode of Presentation, Thin Liquid cup;straw;self-fed   Order of Presentation Series 1,2,3,12,13,15  (13-tablet, 15-AP)   Preparatory Phase WFL   Oral Phase, Thin Liquid WFL   Pharyngeal Phase, Thin Liquid Pharyngeal wall coating   Rosenbek's Penetration Aspiration Scale: Thin Liquid Trial Results 3 - contrast remains above the vocal cords, visible residue remains (penetration)   Diagnostic Statement Fairly consistent penetration with trace residuals remaining in laryngeal vestibule. Pt sensate to this and demonstrates throat clearing in response. No aspiration. Mild vallecular residuals with coating on posterior pharyngeal wall at level of the osteophyte. Barium tablet got stuck above the osteophyte and took several sips of liquid to clear.   VFSS Eval: Mildly Thick Liquids    Mode of Presentation cup;self-fed   Order of Presentation Series 4   Preparatory Phase WFL   Oral Phase WFL   Pharyngeal Phase Residue in valleculae   Rosenbek's Penetration Aspiration Scale 2 - contrast enters airway, remains above the vocal cords, no residue remains (penetration)   Diagnostic Statement Penetration with no residuals remaining in laryngeal vestibule. No aspiration. Incomplete epiglottic inversion d/t presence of osteophyte. Mild vallecular residuals.   VFSS Eval: Puree Solid Texture Trial   Mode of Presentation, Puree spoon;self-fed   Order of Presentation Series 6,7,14  (14-AP)   Preparatory Phase WFL   Oral Phase, Puree WFL   Pharyngeal Phase, Puree Residue in valleculae;Pharyngeal wall coating  (@level of osteophyte)   Rosenbek's Penetration Aspiration Scale: Puree Food Trial Results 2 - contrast enters airway, remains above the vocal cords, no residue remains (penetration)   Diagnostic Statement x1  penetration d/t presence of osteophyte. No aspiration. Mild vallecular residuals with residuals also on posterior pharyngeal wall at the level of the osteophyte. AP unremarkable.   VFSS Eval: Regular Texture Trial (Solid)   Mode of Presentation self-fed   Order of Presentation Series 9,11   Preparatory Phase WFL   Oral Phase WFL   Pharyngeal Phase Residue in valleculae;Pharyngeal wall coating   Rosenbek's Penetration Aspiration Scale 2 - contrast enters airway, remains above the vocal cords, no residue remains (penetration)   Diagnostic Statement x1 penetration d/t presence of osteophyte which limits epiglottic inversion. Mild/moderate vallecular residuals with residuals also on posterior pharyngeal wall at level of the osteophyte.   Swallow Compensations   Swallow Compensations Alternate viscosity of consistencies;Pacing;Reduce amounts;Multiple swallow   Educational Assessment   Barriers to Learning No barriers   General Therapy Interventions   Planned Therapy Interventions Dysphagia Treatment   Dysphagia treatment Oropharyngeal exercise training;Modified diet education;Instruction of safe swallow strategies   Swallow Eval: Clinical Impressions   Skilled Criteria for Therapy Intervention Skilled criteria met.  Treatment indicated.   Dysphagia Outcome Severity Scale (APRIL) Level 4 - APRIL   Treatment Diagnosis Mild/moderate oropharyngeal dysphagia   Diet texture recommendations Regular diet;Thin liquids (level 0)   Recommended Feeding/Eating Techniques alternate between small bites and sips of food/liquid;maintain upright posture during/after eating for 30 mins;small sips/bites   Rehab Potential good, to achieve stated therapy goals   Risks and Benefits of Treatment have been explained. Yes   Patient, family and/or staff in agreement with Plan of Care Yes   Clinical Impression Comments Consistent with findings from previous evaluation, pt continues to demonstrate mild to moderate oropharyngeal dysphagia in the  setting of weakness, osteophyte obstruction, and hypersensitivity. Underfluoroscopy, pt demonstrates prompt swallow response. Presence of large osteophyte limits epiglottic inversion resulting in penetration of all consistencies. Pt sensate to penetration and demonstrates immediate throat clear/cough response. Vallecular residuals with coating on posterior pharyngeal wall at level of the osteophyte with all textures that increases as PO viscosity increases. Barium tablet got stuck above the osteophyte and took several sips of liquid to clear. At this time, it is recommended that pt cautiously initiate soft and bite sized texture diet and thin liquids.  Pt should be upright for intake and pace self, take small bites/sips, and alternate consistencies. Recommend pt discuss with spine surgeon regarding expectations of post op dysphagia. Recommend ongoing SLP services to participate in pharyngeal strengthening exercises to maximize safety and ease of swallowing in the setting of upcoming surgery.   Swallow Goals   SLP Swallow Goals 1;2;3   Swallow Goal 1   Goal Identifier Exercise   Goal Description Pt will independently complete oropharyngeal strengthening exercises in sets of ten 3-5x/day.   Target Date 04/12/23   Swallow Goal 2   Goal Identifier Safe Swallow Strategies   Goal Description Pt will independently implement aspiration precautions and safe swallow strategies on 4 out of 5 opportunities.   Target Date 04/12/23   Swallow Goal 3   Goal Identifier PO   Goal Description Pt will tolerate least restrictive diet with no overt clinical s/sx of penetration/aspiration in 100% of PO trials.   Target Date 04/12/23   Total Session Time   SLP Eval: VideoFluoroscopic Swallow function Minutes (40451) 38   Total Evaluation Time 38   Therapy Certification   Certification date from 01/12/23   Certification date to 04/12/23   Medical Diagnosis Oropharyngeal dysphagia   Certification I certify the need for these services  furnished under this plan of treatment and while under my care.  (Physician co-signature of this document indicates review and certification of the therapy plan).     Thank you for the referral of Bird Kaur. If you have any questions about this report, please contact me using the information below.     JULIEN Canales MA (music), CCC-SLP   Speech Language Pathologist  [facility] Trained Vocologist   [facility] [place]  Dept. of Otolaryngology  Department of Rehabilitation Services  [address] [place] [address]  Email: [email or web address]   Pronouns: she/her/hers

## 2023-01-17 ENCOUNTER — TELEPHONE (OUTPATIENT)
Dept: OTOLARYNGOLOGY | Facility: CLINIC | Age: 73
End: 2023-01-17

## 2023-01-17 NOTE — TELEPHONE ENCOUNTER
[facility] Call Center    Phone Message    May a detailed message be left on voicemail: no     Reason for Call: Other: [facility] to get [facility] request for [facility] forms filled out and sent back for pt's surgery approval.  Please call them back if needing additional information:  [phone].  Thank you!     Action Taken: Message routed to:  [facility] ([facility] ENT    Travel Screening: Not Applicable

## 2023-01-18 ENCOUNTER — TELEPHONE (OUTPATIENT)
Dept: SPEECH THERAPY | Facility: CLINIC | Age: 73
End: 2023-01-18

## 2023-01-18 NOTE — TELEPHONE ENCOUNTER
Spoke to Steven in regards to surgery recommendations and [facility]. At this point a request for services is not needed from us, as the [facility] is to [facility] done at [facility]. Will discuss with provider in the event that there is additional information to be shared. Steven was understanding of information and appreciative of call.

## 2023-01-18 NOTE — TELEPHONE ENCOUNTER
[facility] Call Center    Phone Message    May a detailed message be left on voicemail: yes     Reason for Call: Other: Pt and Steven- Care Coordinator with [facility] calling to get some clarification about upcoming appt 2/2/2023 with [facility]surgery @ [facility].  Please call Steven saldivar/ [facility] @ [phone]     Action Taken: Message routed to:  [facility] ENT    Travel Screening: Not Applicable

## 2023-01-18 NOTE — TELEPHONE ENCOUNTER
Called patient and provided Dr. Ang's nurse, Aliza's direct phone number. Instructed patient this is the contact info Dr. Ang would like him to give to his spine [facility] at the [facility] so they can connect about his dysphagia and upcoming spine surgery. Pt appreciative .    JULIEN Canales (mariano), MA, CCC-SLP   Speech Language Pathologist  [facility] Trained Vocologist   [facility] [place]  Dept. of Otolaryngology  Department of Rehabilitation Services  [address] [place] [address]  Email: [email or web address]   Phone: [phone]  Pronouns: she/her/hers

## 2023-02-10 ENCOUNTER — TELEPHONE (OUTPATIENT)
Dept: OTOLARYNGOLOGY | Facility: CLINIC | Age: 73
End: 2023-02-10

## 2023-02-10 NOTE — TELEPHONE ENCOUNTER
[facility] Call Center    Phone Message    May a detailed message be left on voicemail: yes     Reason for Call: Other: Maria T with [facility] calling to see if patient's osteosite is the reason for his difficulties with talking/swallowing.  Caller stated that they sent patient to Dr. Ang's clinic to be evaluated and they are wondering if the patient is correct that his symptoms are realted to Osteosite and if surgery would be the best option for patient.  Please call them @ [phone] - okay to leave detailed message.  Thank you!       Action Taken: Message routed to:  [facility] ENT    Travel Screening: Not Applicable

## 2023-02-14 ENCOUNTER — TELEPHONE (OUTPATIENT)
Dept: OTOLARYNGOLOGY | Facility: CLINIC | Age: 73
End: 2023-02-14
Payer: COMMERCIAL

## 2023-02-14 NOTE — TELEPHONE ENCOUNTER
Spoke with Maria T [facility]  The osteophyte is the reason for his swallowing dysfunction  Given how high it is will likely result in significant pharyngeal weakness and would recommend PEG at the time of surgery with intense swallow therapy after to work on tube removal.  Available for direct conversation with the surgeon

## 2023-02-22 ENCOUNTER — TRANSCRIBE ORDERS (OUTPATIENT)
Dept: OTHER | Age: 73
End: 2023-02-22

## 2023-02-22 DIAGNOSIS — R13.10 DYSPHAGIA, UNSPECIFIED: Primary | ICD-10-CM

## 2023-02-24 ENCOUNTER — TELEPHONE (OUTPATIENT)
Dept: NEUROSURGERY | Facility: CLINIC | Age: 73
End: 2023-02-24
Payer: COMMERCIAL

## 2023-02-24 NOTE — TELEPHONE ENCOUNTER
Referred by: Morenita Justice NP @ [facility]   Ph: [phone] Fx: [phone]   [facility] Auth[facility]Ref #: [id number]   Please call to schedule your appointment             Order Questions    Question Answer   Preferred Location: [facility]   Scheduling Instructions: Please call to schedule your appointment   My Clinical Question Is: Pt has known osteophyte that is causing dysphagia. Was seen by N ENT to confirm. Because of location of osteophyte, concern for potential airway issues

## 2023-02-28 NOTE — TELEPHONE ENCOUNTER
SPINE PATIENTS - NEW PROTOCOL PREVISIT    RECORDS RECEIVED FROM: external   REASON FOR VISIT: Dysphagia   Date of Appt: 3/3/23   NOTES (FOR ALL VISITS) STATUS DETAILS   OFFICE NOTE from referring provider Received Morenita Justice NP @ [facility] Neurosurgery:  8/25/22   OFFICE NOTE from other specialist Internal Dr Christina Ang @ [facility] ENT:  1/3/23   MEDICATION LIST Internal    IMAGING  (FOR ALL VISITS)     MRI (HEAD, NECK, SPINE) Received [facility]:  MRI Cervical Spine 2/5/18   CT (HEAD, NECK, SPINE) Received [facility]:  CT Cervical Spine 7/25/22

## 2023-03-03 ENCOUNTER — ANCILLARY PROCEDURE (OUTPATIENT)
Dept: GENERAL RADIOLOGY | Facility: CLINIC | Age: 73
End: 2023-03-03
Attending: NEUROLOGICAL SURGERY
Payer: COMMERCIAL

## 2023-03-03 ENCOUNTER — PRE VISIT (OUTPATIENT)
Dept: NEUROSURGERY | Facility: CLINIC | Age: 73
End: 2023-03-03
Payer: COMMERCIAL

## 2023-03-03 ENCOUNTER — OFFICE VISIT (OUTPATIENT)
Dept: NEUROSURGERY | Facility: CLINIC | Age: 73
End: 2023-03-03
Payer: COMMERCIAL

## 2023-03-03 VITALS
OXYGEN SATURATION: 95 % | BODY MASS INDEX: 38.63 KG/M2 | WEIGHT: 277 LBS | SYSTOLIC BLOOD PRESSURE: 150 MMHG | RESPIRATION RATE: 16 BRPM | DIASTOLIC BLOOD PRESSURE: 99 MMHG | HEART RATE: 63 BPM

## 2023-03-03 DIAGNOSIS — M25.78 CERVICAL OSTEOPHYTE: ICD-10-CM

## 2023-03-03 DIAGNOSIS — M25.78 CERVICAL OSTEOPHYTE: Primary | ICD-10-CM

## 2023-03-03 DIAGNOSIS — R13.10 DYSPHAGIA, UNSPECIFIED: ICD-10-CM

## 2023-03-03 DIAGNOSIS — R13.10 DYSPHAGIA, UNSPECIFIED TYPE: ICD-10-CM

## 2023-03-03 PROCEDURE — 99207 PR NO DOCUMENTATION ON VISIT: CPT | Performed by: NEUROLOGICAL SURGERY

## 2023-03-03 PROCEDURE — 72040 X-RAY EXAM NECK SPINE 2-3 VW: CPT | Mod: GC | Performed by: STUDENT IN AN ORGANIZED HEALTH CARE EDUCATION/TRAINING PROGRAM

## 2023-03-03 ASSESSMENT — PAIN SCALES - GENERAL: PAINLEVEL: NO PAIN (0)

## 2023-03-03 NOTE — PATIENT INSTRUCTIONS
Dr Genao has ordered additional x-rays which you can do on the way out today.  In addition, she would like you to see an ENT surgeon that will assist in any surgery.     Please reach out to our clinic (Jimbo Orourke RN) when the ENT appointment has been completed.

## 2023-03-03 NOTE — LETTER
Date:March 8, 2023      Provider requested that no letter be sent. Do not send.       [facility]

## 2023-03-03 NOTE — LETTER
3/3/2023       RE: Bird Kaur  [address] [place] [address]     Dear Colleague,    Thank you for referring your patient, Bird Kaur, to the [facility] [place] [facility]. Please see a copy of my visit note below.    3/3/2023  Neurosurgery Clinic Visit    History of present illness:  Pt is a 72 year old male with a PMH including HTN, dysphagia, asthma, sleep apnea, obisety, cervical radiculopathy, A fib on eliquis s/p 2 ablations, heart failure, chronic and allergic rhinitis presents for the evaluation of dysphagia in the setting of C3-C4 osteophytes compressing the esophagus causing epiglottic inversion leading ability to eat/drink safely without choking. He reports that he has been experiencing dysphagia since 2 years, gradually worsening, to both solids and liquids. He is now only able to have puddings and liquids. He has extensive workup in ENT and speech swallow clinics, and the evaluation showed C3-C4 osteophytes compressing the esophagus causing epiglottic inversion. He was referred to neurosurgery for further evaluation.    No recent fevers, chills, nausea, vomiting, chest pain, shortness of breath, and denies headaches, weakness, LOC, numbness/weakness/paresthesias in extremities, changes in sensation, taste, smell, nor trouble speaking or other neurologic symptoms.    He is on eliquis for A fib. He quit smoking 30 years ago. He previously had a surgery for cervical radiculopathy and has some vague numbness in his last two digits of his right hand. He has a big neck, with limited range of motion in all planes. No neck radiation or other anterior neck procedures.     PAST MEDICAL HISTORY:  HTN, dysphagia, asthma, sleep apnea, chronic and allergic rhinitis, osteoarthritis, epilepsy, CHICA, gall bladder polyp, obesity, cervical radiculopathy, A fib on eliquis s/p 2 ablations,  HLD      Medications:  Current Outpatient Medications   Medication     apixaban ANTICOAGULANT (ELIQUIS) 5 MG tablet     carboxymethylcellulose PF (REFRESH PLUS) 0.5 % ophthalmic solution     dofetilide (TIKOSYN) 500 MCG capsule     empagliflozin (JARDIANCE) 25 MG TABS tablet     finasteride (PROSCAR) 5 MG tablet     fluticasone (FLONASE) 50 MCG/ACT nasal spray     furosemide (LASIX) 40 MG tablet     lovastatin (MEVACOR) 20 MG tablet     metoprolol tartrate (LOPRESSOR) 100 MG tablet     montelukast (SINGULAIR) 10 MG tablet     potassium chloride ER (K-TAB/KLOR-CON) 10 MEQ CR tablet     trospium (SANCTURA) 20 MG tablet     losartan (COZAAR) 100 MG tablet     sacubitril-valsartan (ENTRESTO) 49-51 MG per tablet     No current facility-administered medications for this visit.       Allergies:     Allergies   Allergen Reactions     Amoxicillin      Flomax [Tamsulosin]      Lisinopril Unknown     Molds & Smuts Unknown     Morphine      Verapamil        Review of systems: 10 point ROS negative except for as detailed in HPI    Physical exam:   BP (!) 150/99   Pulse 63   Resp 16   Wt 125.6 kg (277 lb)   SpO2 95%   BMI 38.63 kg/m      General: Awake and alert and in no acute distress.  Pulm: Breathing comfortably on room air  CN: Symmetric browlift, smile, tongue protrusion, palate elevation, and sternocleidomastoids. No dysarthria. Extraocular muscles are all intact. Pupils react bilaterally and equally  Coordination: Intact finger-nose-finger bilaterally. Symmetric rapid alternating movements in bilateral upper extremities   Motor: No pronator drift. Good muscle bulk throughout. 5 out of 5 strength in bilateral upper and lower extremities  Sensation: Sensation grossly intact to light touch in all extremities.  Gait: Intact tandem gait.  Reflexes: 2+ reflexes bilateral biceps, brachioradialis, and patellar tendons. Holman's reflex negative. Babinski reflex negative. Bilateral clonus negative.     Imaging:  Swallow Eval  1/12/2023:  1. Flash penetration with thin fluids, mildly thick (nectar) liquids,  and a single episode of trace laryngeal flash penetration with puree.  No aspiration with these consistencies.  2. No penetration or aspiration with cracker consistency. Prominent  C3-4 anterior osteophytes, with mild focal narrowing of the esophagus  posteriorly. Details above.  3. AP view demonstration of moderate focal symmetric relative cervical  esophageal narrowing just above the thoracic inlet, without bolus  retrograde flow or significant abnormal transit. No appreciable  external mass compression    MRI from 2018 showed osteophytes at C3-C4 anterior cervical vertebrae, and a small cervical syrinx    Assessment:  Pt is a 72 year old male with a PMH including HTN, dysphagia, asthma, sleep apnea, obisety, cervical radiculopathy, A fib on eliquis s/p 2 ablations, heart failure, chronic and allergic rhinitis presents for the evaluation of dysphagia in the setting of C3-C4 osteophytes compressing the esophagus causing epiglottic inversion leading ability to eat/drink safely without choking    Plan:  Discussed with the patient the pathology of his dysphagia.  The C3-C4 osteophyte is contributing to his dysphagia.  The proposed osteophytectomy with possible fusion to remove the bone spurs.  We explained the indications risks and benefits of the surgery including the possibility of dysphagia not resolving or even worsening, injury to critical structures in the neck.  We recommended a combined surgery with ENT, ENT can help expose the osteophytes and neurosurgery performing the osteophytectomy.  We will obtain a flex ex neck x-ray to determine the mobility in the region.  We will refer the patient to ENT for an evaluation, for a combined surgery.  The patient would also need a preoperative anesthesia evaluation for risk assessment.  He would also need to hold his Eliquis prior to the surgery.  We discussed with the patient that he can  think and undergo the above assessments before we schedule the surgery.  The patient and his wife asked very intelligent questions.  They were very happy with the plan.    Patient seen and discussed with MD Soren Hameed MD  Neurosurgery Resident  Pager [phone]      Again, thank you for allowing me to participate in the care of your patient.      Sincerely,    Michelle Genao MD

## 2023-03-03 NOTE — PROGRESS NOTES
3/3/2023  Neurosurgery Clinic Visit    History of present illness:  Pt is a 72 year old male with a PMH including HTN, dysphagia, asthma, sleep apnea, obisety, cervical radiculopathy, A fib on eliquis s/p 2 ablations, heart failure, chronic and allergic rhinitis presents for the evaluation of dysphagia in the setting of C3-C4 osteophytes compressing the esophagus causing epiglottic inversion leading ability to eat/drink safely without choking. He reports that he has been experiencing dysphagia since 2 years, gradually worsening, to both solids and liquids. He is now only able to have puddings and liquids. He has extensive workup in ENT and speech swallow clinics, and the evaluation showed C3-C4 osteophytes compressing the esophagus causing epiglottic inversion. He was referred to neurosurgery for further evaluation.    No recent fevers, chills, nausea, vomiting, chest pain, shortness of breath, and denies headaches, weakness, LOC, numbness/weakness/paresthesias in extremities, changes in sensation, taste, smell, nor trouble speaking or other neurologic symptoms.    He is on eliquis for A fib. He quit smoking 30 years ago. He previously had a surgery for cervical radiculopathy and has some vague numbness in his last two digits of his right hand. He has a big neck, with limited range of motion in all planes. No neck radiation or other anterior neck procedures.     PAST MEDICAL HISTORY:  HTN, dysphagia, asthma, sleep apnea, chronic and allergic rhinitis, osteoarthritis, epilepsy, CHICA, gall bladder polyp, obesity, cervical radiculopathy, A fib on eliquis s/p 2 ablations, HLD      Medications:  Current Outpatient Medications   Medication     apixaban ANTICOAGULANT (ELIQUIS) 5 MG tablet     carboxymethylcellulose PF (REFRESH PLUS) 0.5 % ophthalmic solution     dofetilide (TIKOSYN) 500 MCG capsule     empagliflozin (JARDIANCE) 25 MG TABS tablet     finasteride (PROSCAR) 5 MG tablet     fluticasone (FLONASE) 50 MCG/ACT  nasal spray     furosemide (LASIX) 40 MG tablet     lovastatin (MEVACOR) 20 MG tablet     metoprolol tartrate (LOPRESSOR) 100 MG tablet     montelukast (SINGULAIR) 10 MG tablet     potassium chloride ER (K-TAB/KLOR-CON) 10 MEQ CR tablet     trospium (SANCTURA) 20 MG tablet     losartan (COZAAR) 100 MG tablet     sacubitril-valsartan (ENTRESTO) 49-51 MG per tablet     No current facility-administered medications for this visit.       Allergies:     Allergies   Allergen Reactions     Amoxicillin      Flomax [Tamsulosin]      Lisinopril Unknown     Molds & Smuts Unknown     Morphine      Verapamil        Review of systems: 10 point ROS negative except for as detailed in HPI    Physical exam:   BP (!) 150/99   Pulse 63   Resp 16   Wt 125.6 kg (277 lb)   SpO2 95%   BMI 38.63 kg/m      General: Awake and alert and in no acute distress.  Pulm: Breathing comfortably on room air  CN: Symmetric browlift, smile, tongue protrusion, palate elevation, and sternocleidomastoids. No dysarthria. Extraocular muscles are all intact. Pupils react bilaterally and equally  Coordination: Intact finger-nose-finger bilaterally. Symmetric rapid alternating movements in bilateral upper extremities   Motor: No pronator drift. Good muscle bulk throughout. 5 out of 5 strength in bilateral upper and lower extremities  Sensation: Sensation grossly intact to light touch in all extremities.  Gait: Intact tandem gait.  Reflexes: 2+ reflexes bilateral biceps, brachioradialis, and patellar tendons. Holman's reflex negative. Babinski reflex negative. Bilateral clonus negative.     Imaging:  Swallow Eval 1/12/2023:  1. Flash penetration with thin fluids, mildly thick (nectar) liquids,  and a single episode of trace laryngeal flash penetration with puree.  No aspiration with these consistencies.  2. No penetration or aspiration with cracker consistency. Prominent  C3-4 anterior osteophytes, with mild focal narrowing of the esophagus  posteriorly.  Details above.  3. AP view demonstration of moderate focal symmetric relative cervical  esophageal narrowing just above the thoracic inlet, without bolus  retrograde flow or significant abnormal transit. No appreciable  external mass compression    MRI from 2018 showed osteophytes at C3-C4 anterior cervical vertebrae, and a small cervical syrinx    Assessment:  Pt is a 72 year old male with a PMH including HTN, dysphagia, asthma, sleep apnea, obisety, cervical radiculopathy, A fib on eliquis s/p 2 ablations, heart failure, chronic and allergic rhinitis presents for the evaluation of dysphagia in the setting of C3-C4 osteophytes compressing the esophagus causing epiglottic inversion leading ability to eat/drink safely without choking    Plan:  Discussed with the patient the pathology of his dysphagia.  The C3-C4 osteophyte is contributing to his dysphagia.  The proposed osteophytectomy with possible fusion to remove the bone spurs.  We explained the indications risks and benefits of the surgery including the possibility of dysphagia not resolving or even worsening, injury to critical structures in the neck.  We recommended a combined surgery with ENT, ENT can help expose the osteophytes and neurosurgery performing the osteophytectomy.  We will obtain a flex ex neck x-ray to determine the mobility in the region.  We will refer the patient to ENT for an evaluation, for a combined surgery.  The patient would also need a preoperative anesthesia evaluation for risk assessment.  He would also need to hold his Eliquis prior to the surgery.  We discussed with the patient that he can think and undergo the above assessments before we schedule the surgery.  The patient and his wife asked very intelligent questions.  They were very happy with the plan.    Patient seen and discussed with MD Soren Hameed MD  Neurosurgery Resident  Pager [phone]

## 2023-03-09 ENCOUNTER — TELEPHONE (OUTPATIENT)
Dept: OTOLARYNGOLOGY | Facility: CLINIC | Age: 73
End: 2023-03-09
Payer: COMMERCIAL

## 2023-03-09 NOTE — TELEPHONE ENCOUNTER
Pt didn't have time to schedule when writer called.  Gave pt call center number to schedule new appt with Dr Quintanilla next available.     appt notes: Dysphagia, unspecified type referred by Michelle Genao MD

## 2023-03-09 NOTE — TELEPHONE ENCOUNTER
[facility] Call Center    Phone Message    May a detailed message be left on voicemail: no     Reason for Call: Appointment Intake    Referring Provider Name: Michelle Genao MD  Diagnosis and/or Symptoms: R13.10 (ICD-10-CM) - Dysphagia, unspecified type      Sending to clinic, referring is requesting a combined surgery with Dr Quintanilla and Neurosurgery.     Unsure how to proceed, please review notes from the appointment with Dr Genao for further information.    Action Taken: Other: ENT    Travel Screening: Not Applicable

## 2023-03-09 NOTE — TELEPHONE ENCOUNTER
FUTURE VISIT INFORMATION:      FUTURE VISIT INFORMATION:    Date: 4/3/23    Time: 10:20 am    Location: [facility]  REFERRAL INFORMATION:    Referring provider:     Referring providers clinic:     Reason for visit/diagnosis Per pt/ref, Dysphagia, unspecified type [R13.10], Surgical Assist, recs/ref in [facility], confirmed [facility] location    RECORDS REQUESTED FROM:       Clinic name Comments Records Status Imaging Status   [facility] [place] 3/3/23 OV note -Michelle Genao MD [facility] [place] 1/3/23 OV note - Christina Ang MD and Bernard Naylor, SLP  [facility] Imaging 3/3/23 XR cervical  1/12/23 XR video swallow  3/29/21 XR video swallow [facility] 7/25/22 CT Cervical   2/5/18 MR Cervical  Pending- sent to scan 3/10/23 Pacs                  March 9, 2023 at 2:52 PM - Send request to [facility] for imaging reports -[facility]  March 10, 2023 at 2:38 PM - Received imaging report and sent to scan -[facility]

## 2023-03-20 ENCOUNTER — DOCUMENTATION ONLY (OUTPATIENT)
Dept: OTHER | Facility: CLINIC | Age: 73
End: 2023-03-20
Payer: COMMERCIAL

## 2023-04-02 ENCOUNTER — TRANSFERRED RECORDS (OUTPATIENT)
Dept: HEALTH INFORMATION MANAGEMENT | Facility: CLINIC | Age: 73
End: 2023-04-02
Payer: COMMERCIAL

## 2023-04-02 NOTE — PROGRESS NOTES
"Dear Dr. Genao:    I had the pleasure of meeting Bird Kaur in consultation today at the [facility] at your request.     History of Present Illness:   Bird Kaur is a 72 year old man referred for consideration of co-surgery. He has a history of a 2 year history of dysphagia. He was seen by Dr Ang on 1/3/2023 for evaluation of the dysphagia. He had a swallow study on 1/12/2023 which showed flash penetration on thin liquids and nectar liquids, prominent osteophyte at C3-4 with mild focal narrowing of esophagus. He saw Dr Genao on 3/3/2023 who discussed consideration of removal of the osteophyte and fusion.    He feels like his swallowing is getting worse so he feels like not intervening is not an option. He is hoping it will get some relief with his talking and swallowing. He has an easier time with solids. Water he has a hard time swallowing, feels like things \"lock up\". Pudding and jello consistency seem to plug up his airway.     He understands a feeding tube may be required.     He has never had anterior neck surgery.     He says he saw cardiology about 2 weeks ago and was told he was safe to undergo surgery.     He is accompanied by his daughter.       Past medical history: hypertension, dysphagia, asthma, sleep apnea with CPAP, obesity, cervical radiculopathy, atrial fibrillation - chronic anticoagulation on eliquis, heart failure    Past surgical history: multiple hernia repair, appendectomy, ulnar nerve (through posterior spine approach), cardioablation x 2, liver cyst removal (laparoscopic), knee replacement    Social history: Previous smoker - started in the service, quit when left service in 1977. No chewing tobacco. Rare alcohol. . Retired but still working - volunteer .       MEDICATIONS:     Current Outpatient Medications   Medication Sig Dispense Refill     apixaban ANTICOAGULANT (ELIQUIS) 5 MG tablet Take 5 mg by mouth 2 times daily       " carboxymethylcellulose PF (REFRESH PLUS) 0.5 % ophthalmic solution 1 drop 3 times daily as needed for dry eyes       dofetilide (TIKOSYN) 500 MCG capsule Take 500 mcg by mouth 2 times daily       empagliflozin (JARDIANCE) 25 MG TABS tablet Take 25 mg by mouth daily       finasteride (PROSCAR) 5 MG tablet Take 5 mg by mouth daily       fluticasone (FLONASE) 50 MCG/ACT nasal spray Spray 1 spray into both nostrils daily       furosemide (LASIX) 40 MG tablet Take 40 mg by mouth daily       losartan (COZAAR) 100 MG tablet Take 100 mg by mouth daily       lovastatin (MEVACOR) 20 MG tablet Take 20 mg by mouth At Bedtime       metoprolol tartrate (LOPRESSOR) 100 MG tablet Take 100 mg by mouth 2 times daily       montelukast (SINGULAIR) 10 MG tablet Take 10 mg by mouth At Bedtime       potassium chloride ER (K-TAB/KLOR-CON) 10 MEQ CR tablet Take 20 mEq by mouth 2 times daily       sacubitril-valsartan (ENTRESTO) 49-51 MG per tablet Take 1 tablet by mouth 2 times daily       trospium (SANCTURA) 20 MG tablet Take 20 mg by mouth 2 times daily (before meals)         ALLERGIES:    Allergies   Allergen Reactions     Amoxicillin      Flomax [Tamsulosin]      Lisinopril Unknown     Molds & Smuts Unknown     Morphine      Verapamil        HABITS/SOCIAL HISTORY:   Previous smoker - started in the service, quit when left service in 1977. No chewing tobacco. Rare alcohol.   .   Retired but still working - volunteer .     Social History     Socioeconomic History     Marital status: Patient Declined     Spouse name: Not on file     Number of children: Not on file     Years of education: Not on file     Highest education level: Not on file   Occupational History     Not on file   Tobacco Use     Smoking status: Former     Years: 5.00     Types: Cigarettes     Smokeless tobacco: Never     Tobacco comments:     Quit smoking in 1977 . Smoked 5 years in the . Usually one cigarette per day    Vaping Use     Vaping status:  "Not on file   Substance and Sexual Activity     Alcohol use: Not Currently     Drug use: Not on file     Sexual activity: Not on file   Other Topics Concern     Not on file   Social History Narrative     Not on file     Social Determinants of Health     Financial Resource Strain: Not on file   Food Insecurity: Not on file   Transportation Needs: Not on file   Physical Activity: Not on file   Stress: Not on file   Social Connections: Not on file   Intimate Partner Violence: Not on file   Housing Stability: Not on file       PAST MEDICAL HISTORY: No past medical history on file.     PAST SURGICAL HISTORY: No past surgical history on file.    FAMILY HISTORY:  No family history on file.    REVIEW OF SYSTEMS:  12 point ROS was negative other than the symptoms noted above in the HPI.  Patient Supplied Answers to Review of Systems      4/3/2023    10:18 AM    ENT ROS   Constitutional Problems with sleep   Neurology Dizzy spells   Eyes Visual loss   Ears, Nose, Throat Ringing/noise in ears    Trouble swallowing    Hoarseness   Cardiopulmonary Cough    Breathing problems   Musculoskeletal Sore or stiff joints    Swollen joints    Back pain    Neck pain    Swollen legs/feet   Allergy/Immunology Allergies or hay fever         PHYSICAL EXAMINATION:   BP (!) 161/96   Pulse 70   Temp 98  F (36.7  C)   Ht 1.803 m (5' 11\")   Wt 125.6 kg (277 lb)   SpO2 96%   BMI 38.63 kg/m     Appearance:   normal; NAD, age-appropriate appearance, well-developed, obese   Communication:   normal; communicates verbally, normal voice quality   Head/Face:   inspection -  Normal; no scars or visible lesions   Skin:  normal, no rash   Ears:  auricle (AD) -  normal  auricle (AS) -  normal  Normal clinical speech reception   Nose:  Ext. inspection -  Normal  Internal Inspection -  Normal mucosa, septum, and turbinates   Nasopharynx:  normal mucosa, no masses   Oral Cavity:  lips -  Normal mucosa, oral competence, and stoma size   Age-appropriate " dentition, healthy gingival mucosa   Hard palate, buccal, floor of mouth mucosa normal   Tongue - normal movement, no lesions   Oropharynx:  mucosa -  Normal, no lesions  soft palate -  Normal, no lesions, no asymmetry, normal elevation  tonsils -  Normal, no exudates, no abnormal lesions, symmetric  Base of tongue - Normal  Vallecula - clear   Hypopharynx:  Normal pyriform sinus and pharyngeal wall mucosa   No pooled secretions   Prominence of posterior pharyngeal wall just above the level of the epiglottis   Larynx:  Epiglottis, AE folds, false vocal cords, true vocal cords, arytenoids normal in appearance, bilaterally mobile cords   Penetration of secretions   Neck: No visible mass or asymmetry   Normal palpation  No scars   Lymphatic:  no abnormal nodes   Cardiovascular:  warm, pink, well-perfused extremities without swelling, tenderness, or edema   Respiratory:  Normal respiratory effort, no stridor   Neuro/Psych.:  mood/affect -  normal  mental status -  normal          PROCEDURES:   Flexible fiberoptic laryngoscopy: Scope exam was indicated due to dysphagia. Verbal consent was obtained. The nasal cavity was prepped with an aerosolized solution of topical anesthetic and vasoconstrictive agent. The scope was passed through the anterior nasal cavity and advanced. Inspection of the nasopharynx revealed no gross abnormality. The base of tongue and vallecula are normal. There is prominence of the posterior pharyngeal wall just above the level of the epiglottis. The epiglottis, AE folds, false cords, true cords, arytenoids are normal. Inspection of the larynx revealed bilaterally mobile vocal cords. Pyriform sinuses are symmetric. The airway is patent. Procedure tolerated well with no immediate complications noted.    RESULTS REVIEWED:   I reviewed the note from neurosurgery    I reviewed the swallow study report, x-ray report    I reviewed the note from Dr Ang, note from SLP     Care discussed with SLP        IMPRESSION AND PLAN:   Bird Kaur is a 72 year old man with anterior spine osteophytes causing dysphagia.    I discussed with her that Dr Genao is planning on revision spine surgery. I explained that I would help with the approach.     I explained to him that there is a risk to his recurrent laryngeal nerve. We discussed that this can be temporary or permanent. Injury can result in dysphagia and dysphonia. We discussed if there is concerns for vocal cord paralysis/paresis then there are options for injection to the cord that can be done by our laryngology colleagues, would send back to Dr Ang.     I discussed that he is at increased risk of dysphagia postoperatively. He could have persistent symptoms despite surgery. We discussed possible NG tube placement temporarily and if prolonged dysphagia could require PEG placement temporarily. He has baseline dysphagia from the osteophytes already. He should see the SLP team postoperatively pending his swallow function, may require repeat VSS postoperatively pending their input. He was encouraged to have SLP rehab services postop either with our team at the [place] or the [facility]. [facility], encouraged to do preop so plan is in place. Discussed that home services may not be ideal if the team does not have significant dysphagia experience.     We discussed the risk of bleeding. We discussed that bleeding in the neck can be a life threatening emergency requiring a return trip to the operating room. He is on anticoagulation which would be held with surgery but would need to be restarted at some point in the postoperative setting and he could have bleeding with the resumption.    He says he was cleared by cardiology. He will need PAC clearance as well given his comorbidities.    We will have our team work with Dr Genao's team on finding a surgical date if he wishes to proceed.      Thank you very much for the opportunity  to participate in the care of your patient.      Esther Quintanilla MD  Otolaryngology- Head & Neck Surgery      This note was dictated with voice recognition software and then edited. Please excuse any unintentional errors.       A total of 32 minutes was spent on patient visit and charting activities on date of service.       CC:  Michelle Genao MD  [address] [facility] [id number]

## 2023-04-03 ENCOUNTER — PRE VISIT (OUTPATIENT)
Dept: OTOLARYNGOLOGY | Facility: CLINIC | Age: 73
End: 2023-04-03
Payer: COMMERCIAL

## 2023-04-03 ENCOUNTER — OFFICE VISIT (OUTPATIENT)
Dept: OTOLARYNGOLOGY | Facility: CLINIC | Age: 73
End: 2023-04-03
Payer: COMMERCIAL

## 2023-04-03 VITALS
SYSTOLIC BLOOD PRESSURE: 161 MMHG | DIASTOLIC BLOOD PRESSURE: 96 MMHG | HEART RATE: 70 BPM | BODY MASS INDEX: 38.78 KG/M2 | HEIGHT: 71 IN | TEMPERATURE: 98 F | OXYGEN SATURATION: 96 % | WEIGHT: 277 LBS

## 2023-04-03 DIAGNOSIS — R13.12 OROPHARYNGEAL DYSPHAGIA: Primary | ICD-10-CM

## 2023-04-03 DIAGNOSIS — R13.10 DYSPHAGIA, UNSPECIFIED TYPE: ICD-10-CM

## 2023-04-03 PROCEDURE — 31575 DIAGNOSTIC LARYNGOSCOPY: CPT | Performed by: OTOLARYNGOLOGY

## 2023-04-03 PROCEDURE — 99214 OFFICE O/P EST MOD 30 MIN: CPT | Mod: 25 | Performed by: OTOLARYNGOLOGY

## 2023-04-03 ASSESSMENT — PAIN SCALES - GENERAL: PAINLEVEL: MODERATE PAIN (4)

## 2023-04-03 NOTE — LETTER
"4/3/2023       RE: Bird Kaur  [address] [facility] [address]     Dear Colleague,    Thank you for referring your patient, Bird Kaur, to the [facility] [place] [facility]. Please see a copy of my visit note below.    Dear Dr. Genao:    I had the pleasure of meeting Bird Kaur in consultation today at the [facility] at your request.     History of Present Illness:   Bird Kaur is a 72 year old man referred for consideration of co-surgery. He has a history of a 2 year history of dysphagia. He was seen by Dr Ang on 1/3/2023 for evaluation of the dysphagia. He had a swallow study on 1/12/2023 which showed flash penetration on thin liquids and nectar liquids, prominent osteophyte at C3-4 with mild focal narrowing of esophagus. He saw Dr Genao on 3/3/2023 who discussed consideration of removal of the osteophyte and fusion.    He feels like his swallowing is getting worse so he feels like not intervening is not an option. He is hoping it will get some relief with his talking and swallowing. He has an easier time with solids. Water he has a hard time swallowing, feels like things \"lock up\". Pudding and jello consistency seem to plug up his airway.     He understands a feeding tube may be required.     He has never had anterior neck surgery.     He says he saw cardiology about 2 weeks ago and was told he was safe to undergo surgery.     He is accompanied by his daughter.       Past medical history: hypertension, dysphagia, asthma, sleep apnea with CPAP, obesity, cervical radiculopathy, atrial fibrillation - chronic anticoagulation on eliquis, heart failure    Past surgical history: multiple hernia repair, appendectomy, ulnar nerve (through posterior spine approach), cardioablation x 2, liver cyst removal (laparoscopic), knee replacement    Social history: " Previous smoker - started in the service, quit when left service in 1977. No chewing tobacco. Rare alcohol. . Retired but still working - volunteer .       MEDICATIONS:     Current Outpatient Medications   Medication Sig Dispense Refill    apixaban ANTICOAGULANT (ELIQUIS) 5 MG tablet Take 5 mg by mouth 2 times daily      carboxymethylcellulose PF (REFRESH PLUS) 0.5 % ophthalmic solution 1 drop 3 times daily as needed for dry eyes      dofetilide (TIKOSYN) 500 MCG capsule Take 500 mcg by mouth 2 times daily      empagliflozin (JARDIANCE) 25 MG TABS tablet Take 25 mg by mouth daily      finasteride (PROSCAR) 5 MG tablet Take 5 mg by mouth daily      fluticasone (FLONASE) 50 MCG/ACT nasal spray Spray 1 spray into both nostrils daily      furosemide (LASIX) 40 MG tablet Take 40 mg by mouth daily      losartan (COZAAR) 100 MG tablet Take 100 mg by mouth daily      lovastatin (MEVACOR) 20 MG tablet Take 20 mg by mouth At Bedtime      metoprolol tartrate (LOPRESSOR) 100 MG tablet Take 100 mg by mouth 2 times daily      montelukast (SINGULAIR) 10 MG tablet Take 10 mg by mouth At Bedtime      potassium chloride ER (K-TAB/KLOR-CON) 10 MEQ CR tablet Take 20 mEq by mouth 2 times daily      sacubitril-valsartan (ENTRESTO) 49-51 MG per tablet Take 1 tablet by mouth 2 times daily      trospium (SANCTURA) 20 MG tablet Take 20 mg by mouth 2 times daily (before meals)         ALLERGIES:    Allergies   Allergen Reactions    Amoxicillin     Flomax [Tamsulosin]     Lisinopril Unknown    Molds & Smuts Unknown    Morphine     Verapamil        HABITS/SOCIAL HISTORY:   Previous smoker - started in the service, quit when left service in 1977. No chewing tobacco. Rare alcohol.   .   Retired but still working - volunteer .     Social History     Socioeconomic History    Marital status: Patient Declined     Spouse name: Not on file    Number of children: Not on file    Years of education: Not on file    Highest  "education level: Not on file   Occupational History    Not on file   Tobacco Use    Smoking status: Former     Years: 5.00     Types: Cigarettes    Smokeless tobacco: Never    Tobacco comments:     Quit smoking in 1977 . Smoked 5 years in the . Usually one cigarette per day    Vaping Use    Vaping status: Not on file   Substance and Sexual Activity    Alcohol use: Not Currently    Drug use: Not on file    Sexual activity: Not on file   Other Topics Concern    Not on file   Social History Narrative    Not on file     Social Determinants of Health     Financial Resource Strain: Not on file   Food Insecurity: Not on file   Transportation Needs: Not on file   Physical Activity: Not on file   Stress: Not on file   Social Connections: Not on file   Intimate Partner Violence: Not on file   Housing Stability: Not on file       PAST MEDICAL HISTORY: No past medical history on file.     PAST SURGICAL HISTORY: No past surgical history on file.    FAMILY HISTORY:  No family history on file.    REVIEW OF SYSTEMS:  12 point ROS was negative other than the symptoms noted above in the HPI.  Patient Supplied Answers to Review of Systems      4/3/2023    10:18 AM    ENT ROS   Constitutional Problems with sleep   Neurology Dizzy spells   Eyes Visual loss   Ears, Nose, Throat Ringing/noise in ears    Trouble swallowing    Hoarseness   Cardiopulmonary Cough    Breathing problems   Musculoskeletal Sore or stiff joints    Swollen joints    Back pain    Neck pain    Swollen legs/feet   Allergy/Immunology Allergies or hay fever         PHYSICAL EXAMINATION:   BP (!) 161/96   Pulse 70   Temp 98  F (36.7  C)   Ht 1.803 m (5' 11\")   Wt 125.6 kg (277 lb)   SpO2 96%   BMI 38.63 kg/m     Appearance:   normal; NAD, age-appropriate appearance, well-developed, obese   Communication:   normal; communicates verbally, normal voice quality   Head/Face:   inspection -  Normal; no scars or visible lesions   Skin:  normal, no rash   Ears:  " auricle (AD) -  normal  auricle (AS) -  normal  Normal clinical speech reception   Nose:  Ext. inspection -  Normal  Internal Inspection -  Normal mucosa, septum, and turbinates   Nasopharynx:  normal mucosa, no masses   Oral Cavity:  lips -  Normal mucosa, oral competence, and stoma size   Age-appropriate dentition, healthy gingival mucosa   Hard palate, buccal, floor of mouth mucosa normal   Tongue - normal movement, no lesions   Oropharynx:  mucosa -  Normal, no lesions  soft palate -  Normal, no lesions, no asymmetry, normal elevation  tonsils -  Normal, no exudates, no abnormal lesions, symmetric  Base of tongue - Normal  Vallecula - clear   Hypopharynx:  Normal pyriform sinus and pharyngeal wall mucosa   No pooled secretions   Prominence of posterior pharyngeal wall just above the level of the epiglottis   Larynx:  Epiglottis, AE folds, false vocal cords, true vocal cords, arytenoids normal in appearance, bilaterally mobile cords   Penetration of secretions   Neck: No visible mass or asymmetry   Normal palpation  No scars   Lymphatic:  no abnormal nodes   Cardiovascular:  warm, pink, well-perfused extremities without swelling, tenderness, or edema   Respiratory:  Normal respiratory effort, no stridor   Neuro/Psych.:  mood/affect -  normal  mental status -  normal          PROCEDURES:   Flexible fiberoptic laryngoscopy: Scope exam was indicated due to dysphagia. Verbal consent was obtained. The nasal cavity was prepped with an aerosolized solution of topical anesthetic and vasoconstrictive agent. The scope was passed through the anterior nasal cavity and advanced. Inspection of the nasopharynx revealed no gross abnormality. The base of tongue and vallecula are normal. There is prominence of the posterior pharyngeal wall just above the level of the epiglottis. The epiglottis, AE folds, false cords, true cords, arytenoids are normal. Inspection of the larynx revealed bilaterally mobile vocal cords. Pyriform  sinuses are symmetric. The airway is patent. Procedure tolerated well with no immediate complications noted.    RESULTS REVIEWED:   I reviewed the note from neurosurgery    I reviewed the swallow study report, x-ray report    I reviewed the note from Dr Ang, note from SLP     Care discussed with SLP       IMPRESSION AND PLAN:   Bird Kaur is a 72 year old man with anterior spine osteophytes causing dysphagia.    I discussed with her that Dr Genao is planning on revision spine surgery. I explained that I would help with the approach.     I explained to him that there is a risk to his recurrent laryngeal nerve. We discussed that this can be temporary or permanent. Injury can result in dysphagia and dysphonia. We discussed if there is concerns for vocal cord paralysis/paresis then there are options for injection to the cord that can be done by our laryngology colleagues, would send back to Dr Ang.     I discussed that he is at increased risk of dysphagia postoperatively. He could have persistent symptoms despite surgery. We discussed possible NG tube placement temporarily and if prolonged dysphagia could require PEG placement temporarily. He has baseline dysphagia from the osteophytes already. He should see the SLP team postoperatively pending his swallow function, may require repeat VSS postoperatively pending their input. He was encouraged to have SLP rehab services postop either with our team at the [place] or the [facility]. [facility], encouraged to do preop so plan is in place. Discussed that home services may not be ideal if the team does not have significant dysphagia experience.     We discussed the risk of bleeding. We discussed that bleeding in the neck can be a life threatening emergency requiring a return trip to the operating room. He is on anticoagulation which would be held with surgery but would need to be restarted at some point in the postoperative  setting and he could have bleeding with the resumption.    He says he was cleared by cardiology. He will need PAC clearance as well given his comorbidities.    We will have our team work with Dr Genao's team on finding a surgical date if he wishes to proceed.      Thank you very much for the opportunity to participate in the care of your patient.      Esther Quintanilla MD  Otolaryngology- Head & Neck Surgery      This note was dictated with voice recognition software and then edited. Please excuse any unintentional errors.       A total of 32 minutes was spent on patient visit and charting activities on date of service.       CC:  Michelle Genao MD  [address] [facility] [address]

## 2023-04-03 NOTE — NURSING NOTE
"Chief Complaint   Patient presents with     Consult     Dysphagia      Blood pressure (!) 161/96, pulse 70, temperature 98  F (36.7  C), height 1.803 m (5' 11\"), weight 125.6 kg (277 lb), SpO2 96 %.    Samir Hunter LPN    "

## 2023-04-04 ENCOUNTER — TELEPHONE (OUTPATIENT)
Dept: OTOLARYNGOLOGY | Facility: CLINIC | Age: 73
End: 2023-04-04
Payer: COMMERCIAL

## 2023-04-04 DIAGNOSIS — M25.78 CERVICAL OSTEOPHYTE: ICD-10-CM

## 2023-04-04 DIAGNOSIS — R13.10 DYSPHAGIA, UNSPECIFIED TYPE: Primary | ICD-10-CM

## 2023-04-04 NOTE — TELEPHONE ENCOUNTER
Pt contacted regarding scheduling optimization PAC visit. Pt requesting to schedule for M,W,F. Pt scheduled 4/17/2023.   Would like to proceed with surgery with Dr. Genao and Dr. Quintanilla. Informed patient writer will send a message to the care team to indicate that patient would like to proceed with surgery.    Irina Villareal on 4/4/2023 at 10:58 AM

## 2023-04-05 NOTE — TELEPHONE ENCOUNTER
FUTURE VISIT INFORMATION      SURGERY INFORMATION:    PAC optimization, prior to surgery Chadwick/Dwight    Consult: mamadou 4/3/23    RECORDS REQUESTED FROM:       [facility] Provider: [facility]- [facility] recs[facility]testing

## 2023-04-13 NOTE — PHARMACY - PREOPERATIVE ASSESSMENT CENTER
Preoperative Assessment Center Medication History Note  Medication history completed on April 13, 2023 by this writer prior to patient's PAC appointment. See Epic admission navigator for prior to admission medications. Operating room staff will still need to confirm medications and last dose information on day of surgery.     Medication history interview sources  Patient, Hospital records and CareEverywhere/SureScripts via phone    Pertinent Information:     Patient does not currently have a surgical date yet. Patient is on apixaban for afib, will need to stop 72 hours prior to the procedure once determined since it may be a dual case with possible fusion.     Changes made to PTA medication list    Added:   o Vit d  o Vit b12    Deleted:   o Glucosamine  o Losartan     Changed:   o Updated Jardiance dosing  o Update lovastatin dosing  o Updated potassium dosing  o Update Entresto dosing    Allergies reviewed with patient and updates made in EHR: no    -- No recent (within 30 days) course of antibiotics  -- No recent (within 30 days) course of systemic steroids  -- Reports being on blood thinning medications apixaban    -- Declines being on any other prescription or over-the-counter medications    Prior to Admission medications    Medication Sig Last Dose Taking? Auth Provider Long Term End Date   apixaban ANTICOAGULANT (ELIQUIS) 5 MG tablet Take 5 mg by mouth 2 times daily Taking Yes Reported, Patient     carboxymethylcellulose PF (REFRESH PLUS) 0.5 % ophthalmic solution Place 1 drop into both eyes 4 times daily as needed for dry eyes Taking Yes Reported, Patient     dofetilide (TIKOSYN) 500 MCG capsule Take 500 mcg by mouth 2 times daily Taking Yes Reported, Patient Yes    empagliflozin (JARDIANCE) 25 MG TABS tablet Take 12.5 mg by mouth daily Taking Yes Reported, Patient     finasteride (PROSCAR) 5 MG tablet Take 5 mg by mouth daily Taking Yes Reported, Patient     fluticasone (FLONASE) 50 MCG/ACT nasal spray  Spray 1 spray into both nostrils daily Taking Yes Reported, Patient     furosemide (LASIX) 40 MG tablet Take 40 mg by mouth daily Taking Yes Reported, Patient Yes    lovastatin (MEVACOR) 20 MG tablet Take 10 mg by mouth At Bedtime Taking Yes Reported, Patient Yes    metoprolol tartrate (LOPRESSOR) 100 MG tablet Take 100 mg by mouth 2 times daily Taking Yes Reported, Patient Yes    montelukast (SINGULAIR) 10 MG tablet Take 10 mg by mouth At Bedtime Taking Yes Reported, Patient Yes    potassium chloride ER (K-TAB) 20 MEQ CR tablet 40mEq every morning and 20Meq every evening Taking Yes Reported, Patient     sacubitril-valsartan (ENTRESTO)  MG per tablet Take 1 tablet by mouth 2 times daily Taking Yes Reported, Patient Yes    trospium (SANCTURA) 20 MG tablet Take 20 mg by mouth 2 times daily (before meals) Taking Yes Reported, Patient     vitamin B-12 (CYANOCOBALAMIN) 100 MCG tablet Take 100 mcg by mouth daily Taking Yes Unknown, Entered By History     Vitamin D3 (CHOLECALCIFEROL) 25 mcg (1000 units) tablet Take 25 mcg by mouth daily Taking Yes Unknown, Entered By History           Medication History Completed By: Gray Garzon RPH 4/13/2023 3:00 PM

## 2023-04-14 RX ORDER — VITAMIN B COMPLEX
25 TABLET ORAL DAILY
COMMUNITY

## 2023-04-14 RX ORDER — SODIUM PHOSPHATE,MONO-DIBASIC 19G-7G/118
1 ENEMA (ML) RECTAL DAILY
COMMUNITY
End: 2023-04-14

## 2023-04-16 LAB
ABO/RH(D): NORMAL
ANTIBODY SCREEN: NEGATIVE
SPECIMEN EXPIRATION DATE: NORMAL

## 2023-04-17 ENCOUNTER — LAB (OUTPATIENT)
Dept: LAB | Facility: CLINIC | Age: 73
End: 2023-04-17
Payer: COMMERCIAL

## 2023-04-17 ENCOUNTER — PRE VISIT (OUTPATIENT)
Dept: SURGERY | Facility: CLINIC | Age: 73
End: 2023-04-17
Payer: COMMERCIAL

## 2023-04-17 ENCOUNTER — OFFICE VISIT (OUTPATIENT)
Dept: SURGERY | Facility: CLINIC | Age: 73
End: 2023-04-17
Payer: COMMERCIAL

## 2023-04-17 ENCOUNTER — ANESTHESIA EVENT (OUTPATIENT)
Dept: SURGERY | Facility: CLINIC | Age: 73
End: 2023-04-17

## 2023-04-17 VITALS
HEIGHT: 71 IN | WEIGHT: 278 LBS | SYSTOLIC BLOOD PRESSURE: 152 MMHG | DIASTOLIC BLOOD PRESSURE: 96 MMHG | TEMPERATURE: 97.8 F | HEART RATE: 72 BPM | OXYGEN SATURATION: 96 % | BODY MASS INDEX: 38.92 KG/M2

## 2023-04-17 DIAGNOSIS — M25.78 OSTEOPHYTE OF CERVICAL SPINE: ICD-10-CM

## 2023-04-17 DIAGNOSIS — R13.10 DYSPHAGIA, UNSPECIFIED TYPE: ICD-10-CM

## 2023-04-17 DIAGNOSIS — Z01.818 PREOP EXAMINATION: Primary | ICD-10-CM

## 2023-04-17 DIAGNOSIS — Z01.818 PREOP EXAMINATION: ICD-10-CM

## 2023-04-17 LAB
ANION GAP SERPL CALCULATED.3IONS-SCNC: 7 MMOL/L (ref 7–15)
BUN SERPL-MCNC: 17.7 MG/DL (ref 8–23)
CALCIUM SERPL-MCNC: 9.4 MG/DL (ref 8.8–10.2)
CHLORIDE SERPL-SCNC: 110 MMOL/L (ref 98–107)
CREAT SERPL-MCNC: 1.27 MG/DL (ref 0.67–1.17)
DEPRECATED HCO3 PLAS-SCNC: 26 MMOL/L (ref 22–29)
ERYTHROCYTE [DISTWIDTH] IN BLOOD BY AUTOMATED COUNT: 13.2 % (ref 10–15)
GFR SERPL CREATININE-BSD FRML MDRD: 60 ML/MIN/1.73M2
GLUCOSE SERPL-MCNC: 100 MG/DL (ref 70–99)
HCT VFR BLD AUTO: 56.1 % (ref 40–53)
HGB BLD-MCNC: 18.7 G/DL (ref 13.3–17.7)
MCH RBC QN AUTO: 29.4 PG (ref 26.5–33)
MCHC RBC AUTO-ENTMCNC: 33.3 G/DL (ref 31.5–36.5)
MCV RBC AUTO: 88 FL (ref 78–100)
PLATELET # BLD AUTO: 182 10E3/UL (ref 150–450)
POTASSIUM SERPL-SCNC: 4.5 MMOL/L (ref 3.4–5.3)
RBC # BLD AUTO: 6.36 10E6/UL (ref 4.4–5.9)
SODIUM SERPL-SCNC: 143 MMOL/L (ref 136–145)
WBC # BLD AUTO: 5.7 10E3/UL (ref 4–11)

## 2023-04-17 PROCEDURE — 36415 COLL VENOUS BLD VENIPUNCTURE: CPT | Performed by: PATHOLOGY

## 2023-04-17 PROCEDURE — 99000 SPECIMEN HANDLING OFFICE-LAB: CPT | Performed by: PATHOLOGY

## 2023-04-17 PROCEDURE — 85027 COMPLETE CBC AUTOMATED: CPT | Performed by: PATHOLOGY

## 2023-04-17 PROCEDURE — 80048 BASIC METABOLIC PNL TOTAL CA: CPT | Performed by: PATHOLOGY

## 2023-04-17 PROCEDURE — 99205 OFFICE O/P NEW HI 60 MIN: CPT | Performed by: PHYSICIAN ASSISTANT

## 2023-04-17 PROCEDURE — 86850 RBC ANTIBODY SCREEN: CPT | Mod: 90 | Performed by: PATHOLOGY

## 2023-04-17 PROCEDURE — 86901 BLOOD TYPING SEROLOGIC RH(D): CPT | Mod: 90 | Performed by: PATHOLOGY

## 2023-04-17 PROCEDURE — 86900 BLOOD TYPING SEROLOGIC ABO: CPT | Mod: 90 | Performed by: PATHOLOGY

## 2023-04-17 ASSESSMENT — LIFESTYLE VARIABLES: TOBACCO_USE: 1

## 2023-04-17 ASSESSMENT — ENCOUNTER SYMPTOMS
DYSRHYTHMIAS: 1
SEIZURES: 0

## 2023-04-17 ASSESSMENT — PAIN SCALES - GENERAL: PAINLEVEL: MODERATE PAIN (4)

## 2023-04-17 NOTE — H&P
Pre-Operative H & P     CC:  Preoperative exam to assess for increased cardiopulmonary risk while undergoing surgery and anesthesia.    Date of Encounter: 4/17/2023  Primary Care Physician:  [place] [facility]     Reason for visit:   Encounter Diagnoses   Name Primary?     Preop examination Yes     Osteophyte of cervical spine      Dysphagia, unspecified type        HPI  Bird Karu is a 72 year old male who presents for pre-operative H & P in preparation for  Procedure Information     Date/Time: TBD     Procedure: TBD    Anesthesia type: general    Pre-op diagnosis: cervical spine osteophytes, dysphagia    Location: TBD    Providers: Dr. Genao, Dr. Quintanilla          Mr. Kaur has a PMH significant for hypertension, chronic atrial fibrillation on Eliquis, heart failure with preserved ejection fraction, status post ablation 2018, repeat PVI left atrial ablation 10/28/2020, DCCV 8/16/2021, grade 3 diastolic dysfunction, asthma, CHICA, morbid obesity, cervical spine osteophytes causing dysphagia.  He has had dysphagia for approximately 2 years.  He had a swallow study in January 2023 which showed flash penetration on thin liquids and nectar liquids, prominent osteophyte at C3-4 with mild focal narrowing of the esophagus.  He reports that his dysphagia is getting worse.  He was evaluated by both Dr. Genao and Dr. Quintanilla and surgical excision of the osteophyte with fusion is being considered.    History is obtained from the patient and chart review    Hx of abnormal bleeding or anti-platelet use: Eliquis      Past Medical History  Past Medical History:   Diagnosis Date     Benign essential hypertension      Chronic atrial fibrillation (H)      Dysphagia      Heart failure with preserved ejection fraction, NYHA class I (H)      Obesity      CHICA (obstructive sleep apnea)        Past Surgical History  Past Surgical History:   Procedure Laterality Date     APPENDECTOMY      in his 20s     cervcial  spine      surgery on c-spine, not a fusion     LAPAROSCOPIC UNROOFING LIVER CYST  2016     MOHS MICROGRAPHIC PROCEDURE       TONSILLECTOMY & ADENOIDECTOMY      childhood     TOTAL KNEE ARTHROPLASTY Left        Prior to Admission Medications  Current Outpatient Medications   Medication Sig Dispense Refill     apixaban ANTICOAGULANT (ELIQUIS) 5 MG tablet Take 5 mg by mouth 2 times daily       carboxymethylcellulose PF (REFRESH PLUS) 0.5 % ophthalmic solution Place 1 drop into both eyes 4 times daily as needed for dry eyes       dofetilide (TIKOSYN) 500 MCG capsule Take 500 mcg by mouth 2 times daily       empagliflozin (JARDIANCE) 25 MG TABS tablet Take 12.5 mg by mouth daily       finasteride (PROSCAR) 5 MG tablet Take 5 mg by mouth daily       fluticasone (FLONASE) 50 MCG/ACT nasal spray Spray 1 spray into both nostrils daily       furosemide (LASIX) 40 MG tablet Take 40 mg by mouth daily       lovastatin (MEVACOR) 20 MG tablet Take 10 mg by mouth At Bedtime       metoprolol tartrate (LOPRESSOR) 100 MG tablet Take 100 mg by mouth 2 times daily       montelukast (SINGULAIR) 10 MG tablet Take 10 mg by mouth At Bedtime       potassium chloride ER (K-TAB) 20 MEQ CR tablet 40mEq every morning and 20Meq every evening       sacubitril-valsartan (ENTRESTO)  MG per tablet Take 1 tablet by mouth 2 times daily       trospium (SANCTURA) 20 MG tablet Take 20 mg by mouth 2 times daily (before meals)       vitamin B-12 (CYANOCOBALAMIN) 100 MCG tablet Take 100 mcg by mouth daily       Vitamin D3 (CHOLECALCIFEROL) 25 mcg (1000 units) tablet Take 25 mcg by mouth daily         Allergies  Allergies   Allergen Reactions     Amoxicillin      Flomax [Tamsulosin]      Lisinopril Unknown     Molds & Smuts Unknown     Morphine      Verapamil        Social History  Social History     Socioeconomic History     Marital status: Patient Declined     Spouse name: Not on file     Number of children: Not on file     Years of education: Not on  file     Highest education level: Not on file   Occupational History     Not on file   Tobacco Use     Smoking status: Former     Years: 5.00     Types: Cigarettes     Quit date:      Years since quittin.3     Passive exposure: Past     Smokeless tobacco: Never     Tobacco comments:     Quit smoking in  . Smoked 5 years in the . Usually one cigarette per day    Vaping Use     Vaping status: Not on file   Substance and Sexual Activity     Alcohol use: Not Currently     Drug use: Never     Sexual activity: Not on file   Other Topics Concern     Not on file   Social History Narrative     Not on file     Social Determinants of Health     Financial Resource Strain: Not on file   Food Insecurity: Not on file   Transportation Needs: Not on file   Physical Activity: Not on file   Stress: Not on file   Social Connections: Not on file   Intimate Partner Violence: Not on file   Housing Stability: Not on file       Family History  Family History   Problem Relation Age of Onset     Anesthesia Reaction No family hx of      Venous thrombosis No family hx of        Review of Systems  The complete review of systems is negative other than noted in the HPI or here.     Anesthesia Evaluation   Pt has had prior anesthetic.     History of anesthetic complications  - PONV.  CHICA, slow to wake.    ROS/MED HX  ENT/Pulmonary:     (+) sleep apnea, uses CPAP, tobacco use, Past use, asthma (related to allergies, mold) Last exacerbation: none recent,   (-) recent URI   Neurologic: Comment: Tremors, bilateral arms, lower jaw, tongue, legs   (-) no seizures and no CVA   Cardiovascular:     (+) hypertension-----Taking blood thinners CHF Last EF: 60% dysrhythmias, a-fib, Previous cardiac testing   Echo: Date: 21 Results:  EF 60%  Mild MR  Mild TR  Grade III diastolic dysfunction  Stress Test: Date: 3/16/20 Results:  Negative for inducible ischemia  Normal perfusion  ECG Reviewed: Date: Results:    Cath: Date: Results:     "  METS/Exercise Tolerance:  Comment: <4   Hematologic:       Musculoskeletal:       GI/Hepatic: Comment: dysphagia      Renal/Genitourinary:     (+) BPH,     Endo:     (+) Obesity,     Psychiatric/Substance Use:  - neg psychiatric ROS     Infectious Disease:       Malignancy:   (+) Malignancy, History of Skin.Skin CA status post Surgery.        Other:  - neg other ROS          BP (!) 152/96 (BP Location: Right arm, Patient Position: Sitting, Cuff Size: Adult Large)   Pulse 72   Temp 97.8  F (36.6  C) (Oral)   Ht 1.803 m (5' 11\")   Wt 126.1 kg (278 lb)   SpO2 96%   BMI 38.77 kg/m      Physical Exam   Constitutional: Awake, alert, cooperative, no apparent distress, and appears stated age.  Eyes: Pupils equal, round and reactive to light, extra ocular muscles intact, sclera clear, conjunctiva normal.  HENT: Normocephalic, oral pharynx with moist mucus membranes, fair dentition. No goiter appreciated.   Respiratory: Clear to auscultation bilaterally, no crackles or wheezing.  Cardiovascular: Regular rate and rhythm, normal S1 and S2, and no murmur noted.  Carotids +2, no bruits. No edema. Palpable pulses to radial and PT arteries.   GI: Not assessed  Lymph/Hematologic: No cervical lymphadenopathy and no supraclavicular lymphadenopathy.  Genitourinary:  deferred  Skin: Warm and dry.  No rashes at anticipated surgical site.   Musculoskeletal: Limited ROM of neck. There is no redness, warmth, or swelling of the joints. Gross motor strength is normal.    Neurologic: Awake, alert, oriented to name, place and time. Cranial nerves II-XII are grossly intact.   Neuropsychiatric: Calm, cooperative. Normal affect.     Prior Labs/Diagnostic Studies   All labs and imaging personally reviewed     Component      Latest Ref Rn 4/17/2023  2:21 PM   WBC      4.0 - 11.0 10e3/uL 5.7    RBC Count      4.40 - 5.90 10e6/uL 6.36 (H)    Hemoglobin      13.3 - 17.7 g/dL 18.7 (H)    Hematocrit      40.0 - 53.0 % 56.1 (H)    MCV      78 - " 100 fL 88    MCH      26.5 - 33.0 pg 29.4    MCHC      31.5 - 36.5 g/dL 33.3    RDW      10.0 - 15.0 % 13.2    Platelet Count      150 - 450 10e3/uL 182       Legend:  (H) High      Component      Latest Ref Rng 4/17/2023  2:21 PM   Sodium      136 - 145 mmol/L 143    Potassium      3.4 - 5.3 mmol/L 4.5    Chloride      98 - 107 mmol/L 110 (H)    Carbon Dioxide (CO2)      22 - 29 mmol/L 26    Anion Gap      7 - 15 mmol/L 7    Urea Nitrogen      8.0 - 23.0 mg/dL 17.7    Creatinine      0.67 - 1.17 mg/dL 1.27 (H)    Calcium      8.8 - 10.2 mg/dL 9.4    Glucose      70 - 99 mg/dL 100 (H)    GFR Estimate      >60 mL/min/1.73m2 60 (L)       Legend:  (H) High  (L) Low      EKG/ stress test - if available please see in ROS above                     Flexible fiberoptic laryngoscopy 4/3/23: Scope exam was indicated due to dysphagia. Verbal consent was obtained. The nasal cavity was prepped with an aerosolized solution of topical anesthetic and vasoconstrictive agent. The scope was passed through the anterior nasal cavity and advanced. Inspection of the nasopharynx revealed no gross abnormality. The base of tongue and vallecula are normal. There is prominence of the posterior pharyngeal wall just above the level of the epiglottis. The epiglottis, AE folds, false cords, true cords, arytenoids are normal. Inspection of the larynx revealed bilaterally mobile vocal cords. Pyriform sinuses are symmetric. The airway is patent. Procedure tolerated well with no immediate complications noted.    The patient's records and results personally reviewed by this provider.     Outside records reviewed from: [facility] Everywhere and [facility]    LAB/DIAGNOSTIC STUDIES TODAY:  CBC, BMP, Type and Screen    Assessment      Bird Kaur is a 72 year old male seen as a PAC referral for risk assessment and optimization for anesthesia.    Plan/Recommendations  Pt will be optimized for the proposed procedure.  See below for details  "on the assessment, risk, and preoperative recommendations    NEUROLOGY  - No history of TIA, CVA or seizure  - Post Op delirium risk factors:  Age and High co-morbid index    ENT  - No current airway concerns.  Will need to be reassessed day of surgery.  Mallampati: I  TM: > 3    CARDIAC  - HTN, chronic afib on Eliquis, HFpEF  - continue entresto, hold Lasix DOS, continue dofetilide, metoprolol, lovastatin  - echo 8/2021 EF 60%, mild TR, mild MR, Grade III diastolic dysfunction  - [facility], last seen 3/2023 without further testing indicated  - denies chest pain, endorses SOB is his baseline without any recent change or worsening.       - METS (Metabolic Equivalents), <4. Pt is sedentary       RCRI-Low risk: Class 2 0.9% complication rate            Total Score: 1    RCRI: CHF        PULMONARY  - Obstructive Sleep Apnea  CHICA with home CPAP.  Patient will be instructed to bring their home CPAP device to the hospital with them.       - Asthma, related to allergies (mold)  Well controlled  - Tobacco History      History   Smoking Status     Former     Years: 5.00     Types: Cigarettes     Quit date: 1978   Smokeless Tobacco     Never       GI  - denies GERD  PONV High Risk  Total Score: 3           1 AN PONV: Patient is not a current smoker    1 AN PONV: Patient has history of PONV    1 AN PONV: Intended Post Op Opioids        /RENAL  - Baseline Creatinine 1.27    ENDOCRINE    - BMI: Estimated body mass index is 38.77 kg/m  as calculated from the following:    Height as of this encounter: 1.803 m (5' 11\").    Weight as of this encounter: 126.1 kg (278 lb).  Obesity (BMI >30)  - No history of Diabetes Mellitus    HEME  VTE Low Risk 0.5%            Total Score: 3    VTE: Greater than 59 yrs old    VTE: Male      - Coagulopathy second to Apixaban (Eliquis)   - will need to hold Eliquis 48 or 72 hours prior to above surgery. Waiting for surgery date still.  Will check with Dr. Genao on her preference and " update chart when final anticoagulation plan is made.    - hgb elevated 18.7, hematocrit 56.1. Will have patient make sure he is hydrated and recheck in one week. If remains elevated will refer him back to PCP for evaluation and management.    ADDENDUM  - repeat H/H with similar values as above. PAC RNCC attempted to get [facility] [place].  Here is their comment:    Reviewed labs from March - the elevated RBC's is compensation to transport O2 due to underlying cardiac issue. Recommends the patient come to clinic in 1-3 weeks for labs which will be reviewed by a hematologist.  If anything else is recommended by the hematologist, they will reach out to the patient.       This will not delay surgery though.         ADDENDUM 5/15/23    - lab at [facility] regarding elevated hemoglobin:        - cardiac MRI and CXR 5/8/23 at [facility]    CXR          Cardiac MR        - recommend patient follow up with cardiology/PCP regarding these test results          Different anesthesia methods/types have been discussed with the patient, but they are aware that the final plan will be decided by the assigned anesthesia provider on the date of service.    Patient was discussed with Dr Mir DEMPSEY with information on surgery time/arrival time, meds and NPO status given by nursing staff. No further diagnostic testing indicated.      On the day of service:     Prep time: 24 minutes  Visit time: 32 minutes  Documentation time: 18 minutes  ------------------------------------------  Total time: 74 minutes      Chloe Smith PA-C  Preoperative Assessment Center  [facility]  Clinic and Surgery Center  Phone: [phone]  Fax: [phone]

## 2023-04-17 NOTE — PATIENT INSTRUCTIONS
Preparing for Your Surgery      Name:  Bird Kaur   MRN:  [id number]   :  1950   Today's Date:  2023       Arriving for surgery:  Surgery date:  To Be Determined   Arrival time:   Your surgery has not yet been scheduled.  An RN from the Pre Admission Nursing Office will call you      1-2 days before your procedure to confirm date, location, arrival time and fasting instructions       Surgeries and procedures: Adult patients can have 2 visitors all through the surgery process.     Visiting hours: 8 a.m. to 8:30 p.m.     Hospital: Adult patients and children under age 18 can have 4 visitor at a time     No visitors under the age of 5 are allowed for hospital patients.  Double occupancy rooms: Patients can have only two visitors at a time.     Patients with disabilities: Can have a support person with them (family member, service provider     Or someone well informed about their needs) plus the allowed number of visitors     Patients confirmed or suspected to have symptoms of COVID 19 or flu:     No visitors allowed for adult patients.   Children (under age 18) can have 1 named visitor.     People who are sick or showing symptoms of COVID 19 or flu:    Are not allowed to visit patients--we can only make exceptions in special situations.       Please follow these guidelines for your visit:   Arrive wearing a mask over your mouth and nose; we will give you a medical mask to wear    If you arrive wearing a cloth mask.   Keep it on during your entire visit, even when in patient's room.   If you don't wear a mask we'll ask you to leave.     Clean your hands with alcohol hand . Do this when you arrive at and leave the building and patient room,    And again after you touch your mask or anything in the room.     You can t visit if you have a fever, cough, shortness of breath, muscle aches, headaches, sore throat    Or diarrhea      Stay 6 feet away from others during your visit and between  visits     Go directly to and from the room you are visiting.     Stay in the patient s room during your visit. Limit going to other places in the hospital as much as possible     Leave bags and jackets at home or in the car.     For everyone s health, please don t come and go during your visit. That includes for smoking   during your visit.     Please come to:  To Be Determined          What can I eat or drink?  -  You may eat and drink normally up to 8 hours prior to arrival time.   -  You may have clear liquids until 2 hours prior to arrival time.     Examples of clear liquids:  Water  Clear broth  Juices (apple, white grape, white cranberry  and cider) without pulp  Noncarbonated, powder based beverages  (lemonade and Harsh-Aid)  Sodas (Sprite, 7-Up, ginger ale and seltzer)  Coffee or tea (without milk or cream)  Gatorade    -  No Alcohol for at least 24 hours before surgery.     Which medicines can I take?    Hold Aspirin for 7 days before surgery.   Hold Multivitamins for 7 days before surgery.  Hold Supplements for 7 days before surgery.  Hold Ibuprofen (Advil, Motrin) for 1 day(s) before surgery--unless otherwise directed by surgeon.  Hold Naproxen (Aleve) for 4 days before surgery.    Hold Jardiance (empagliflosin) for 3 days before surgery     Plan for Apixaban (Eliquis)---We will check with your Doctors and contact you with the plan     -  DO NOT take these medications the day of surgery:  Furosemide       -  PLEASE TAKE these medications the day of surgery:  Eye drops per your routine  Dofeilide (Tikosyn)  Finasteride   Flonase   Metoprolol  Sacubiril Valsartan (Entresto)  Tropium (santura)      How do I prepare myself?  - Please take 2 showers (one the night prior to surgery and one the morning of surgery) using Scrubcare or Hibiclens soap.    Use this soap only from the neck to your toes.     Leave the soap on your skin for one minute--then rinse thoroughly.      You may use your own shampoo and  conditioner. No other hair products.   - Please remove all jewelry and body piercings.  - No lotions, deodorants or fragrance.  - No makeup or fingernail polish.   - Bring your ID and insurance card.    -If you have a Deep Brain Stimulator, Spinal Cord Stimulator, or any Neuro Stimulator device---you must bring the remote control to the hospital.      ALL PATIENTS GOING HOME THE SAME DAY OF SURGERY ARE REQUIRED TO HAVE A RESPONSIBLE ADULT TO DRIVE AND BE IN ATTENDANCE WITH THEM FOR 24 HOURS FOLLOWING SURGERY.    Covid testing policy as of 12/06/2022  Your surgeon will notify and schedule you for a COVID test if one is needed before surgery--please direct any questions or COVID symptoms to your surgeon      Questions or Concerns:    - For any questions regarding the day of surgery or your hospital stay, please contact the Pre Admission Nursing Office at [phone].       - If you have health changes between today and your surgery, please call your surgeon.       - For questions after surgery, please call your surgeons office.

## 2023-04-24 ENCOUNTER — LAB (OUTPATIENT)
Dept: LAB | Facility: CLINIC | Age: 73
End: 2023-04-24
Payer: COMMERCIAL

## 2023-04-24 DIAGNOSIS — Z01.818 PREOP EXAMINATION: ICD-10-CM

## 2023-04-24 LAB
ERYTHROCYTE [DISTWIDTH] IN BLOOD BY AUTOMATED COUNT: 13.3 % (ref 10–15)
HCT VFR BLD AUTO: 54.9 % (ref 40–53)
HGB BLD-MCNC: 18.2 G/DL (ref 13.3–17.7)
MCH RBC QN AUTO: 29.4 PG (ref 26.5–33)
MCHC RBC AUTO-ENTMCNC: 33.2 G/DL (ref 31.5–36.5)
MCV RBC AUTO: 89 FL (ref 78–100)
PLATELET # BLD AUTO: 166 10E3/UL (ref 150–450)
RBC # BLD AUTO: 6.2 10E6/UL (ref 4.4–5.9)
WBC # BLD AUTO: 4.7 10E3/UL (ref 4–11)

## 2023-04-24 PROCEDURE — 36415 COLL VENOUS BLD VENIPUNCTURE: CPT | Performed by: PATHOLOGY

## 2023-04-24 PROCEDURE — 85027 COMPLETE CBC AUTOMATED: CPT | Performed by: PATHOLOGY

## 2023-05-08 ENCOUNTER — TRANSFERRED RECORDS (OUTPATIENT)
Dept: HEALTH INFORMATION MANAGEMENT | Facility: CLINIC | Age: 73
End: 2023-05-08
Payer: COMMERCIAL

## 2023-05-08 LAB
ALT SERPL-CCNC: 11 U/L (ref 13–61)
AST SERPL-CCNC: 13 U/L (ref 15–37)
CREATININE (EXTERNAL): 1.3 MG/DL (ref 0.7–1.2)
GFR ESTIMATED (EXTERNAL): 59 ML/MIN/1.73M2
GLUCOSE (EXTERNAL): 94 MG/DL (ref 74–106)
POTASSIUM (EXTERNAL): 4.1 MMOL/L (ref 3.5–5)

## 2023-05-09 ENCOUNTER — TRANSFERRED RECORDS (OUTPATIENT)
Dept: HEALTH INFORMATION MANAGEMENT | Facility: CLINIC | Age: 73
End: 2023-05-09
Payer: COMMERCIAL

## 2023-05-12 ENCOUNTER — PREP FOR PROCEDURE (OUTPATIENT)
Dept: NEUROLOGY | Facility: CLINIC | Age: 73
End: 2023-05-12
Payer: COMMERCIAL

## 2023-05-12 DIAGNOSIS — R13.12 OROPHARYNGEAL DYSPHAGIA: Primary | ICD-10-CM

## 2023-05-16 ENCOUNTER — PATIENT OUTREACH (OUTPATIENT)
Dept: NEUROSURGERY | Facility: CLINIC | Age: 73
End: 2023-05-16
Payer: COMMERCIAL

## 2023-05-16 ENCOUNTER — TELEPHONE (OUTPATIENT)
Dept: NEUROSURGERY | Facility: CLINIC | Age: 73
End: 2023-05-16
Payer: COMMERCIAL

## 2023-05-16 NOTE — PROGRESS NOTES
Pre-op packet sent via US Mail with instructions to call upon receipt.    Procedure:  C3/4 Osteophytectomy, C3/4 anterior cervical discectomy and fusion  Dual surgeon case ([place])    PAC: 6/1/23 @ 1245  DOS: 6/21/23 @ 0770

## 2023-05-16 NOTE — TELEPHONE ENCOUNTER
Patient is scheduled for surgery with Dr. Genao and Dr Quintanilla    Spoke with: Patient    Date of Surgery: 6/21/23    Location: [facility]    Post op: 2 weeks with PA    Pre op with Provider: Complete    H&P: Scheduled with PAC 6/1/23    Additional imaging/appointments: Phone consult with cardiologist on 6/7, lung CT on 6/17 - scheduled at [facility] in [place]     Surgery packet: Mailed to patient     Additional comments: Patient also unsure what to do about his Hb levels, if further workup is needed.        Afshan Tsang MA on 5/16/2023 at 11:10 AM

## 2023-05-17 NOTE — TELEPHONE ENCOUNTER
FUTURE VISIT INFORMATION      SURGERY INFORMATION:    Date: 6/21/23    [facility]    Surgeon:  Michelle Genao MD Khaja, Sobia Fehmi, MD    Anesthesia Type:  general    Procedure: Cervical 3/4 osteophytectomy with anterior discectomy and fusion    RECORDS REQUESTED FROM:         [facility]- [facility] recs[facility]testing

## 2023-05-18 ENCOUNTER — PATIENT OUTREACH (OUTPATIENT)
Dept: NEUROSURGERY | Facility: CLINIC | Age: 73
End: 2023-05-18
Payer: COMMERCIAL

## 2023-05-18 NOTE — PROGRESS NOTES
Phone call to patient to clarify PAC appointment.  Call to voice mail message. Left detailed message as to appointment date, time and location.  Pre-op packet has been mailed.  Requested MyChart response if further question.

## 2023-05-21 ENCOUNTER — HEALTH MAINTENANCE LETTER (OUTPATIENT)
Age: 73
End: 2023-05-21

## 2023-05-31 LAB
ABO/RH(D): NORMAL
ANTIBODY SCREEN: NEGATIVE
SPECIMEN EXPIRATION DATE: NORMAL

## 2023-05-31 NOTE — PHARMACY - PREOPERATIVE ASSESSMENT CENTER
Preoperative Assessment Center Medication History Note  Medication history completed on May 31, 2023 by this writer prior to patient's PAC appointment. See Epic admission navigator for prior to admission medications. Operating room staff will still need to confirm medications and last dose information on day of surgery.     Medication history interview sources  Patient via phone   -[facility] records via Care Everywhere    Pertinent Information:   -Pt manages his own meds and is a reliable historian     Changes made to PTA medication list    Added: none    Deleted: none    Changed:   o Lovastatin 20 mg tab, 0.5 tab (10 mg) qhs > 20 mg qhs (full tab) - [facility] with [facility]    Allergies reviewed with patient and updates made in EHR: no    -- No recent (within 30 days) course of antibiotics  -- No recent (within 30 days) course of systemic steroids  -- Reports being on blood thinning medications - apixaban 5 mg BID   -- Declines being on any other prescription or over-the-counter medications    Prior to Admission medications    Medication Sig Last Dose Taking? Auth Provider Long Term End Date   apixaban ANTICOAGULANT (ELIQUIS) 5 MG tablet Take 5 mg by mouth 2 times daily Taking Yes Reported, Patient     carboxymethylcellulose PF (REFRESH PLUS) 0.5 % ophthalmic solution Place 1 drop into both eyes 4 times daily as needed for dry eyes Taking Yes Reported, Patient     dofetilide (TIKOSYN) 500 MCG capsule Take 500 mcg by mouth 2 times daily Taking Yes Reported, Patient Yes    empagliflozin (JARDIANCE) 25 MG TABS tablet Take 12.5 mg by mouth daily Taking Yes Reported, Patient     finasteride (PROSCAR) 5 MG tablet Take 5 mg by mouth daily Taking Yes Reported, Patient     fluticasone (FLONASE) 50 MCG/ACT nasal spray Spray 1 spray into both nostrils daily as needed for allergies Taking Yes Reported, Patient     furosemide (LASIX) 40 MG tablet Take 40 mg by mouth daily Taking Yes Reported, Patient Yes     lovastatin (MEVACOR) 20 MG tablet Take 20 mg by mouth At Bedtime Taking Yes Reported, Patient Yes    metoprolol tartrate (LOPRESSOR) 100 MG tablet Take 100 mg by mouth 2 times daily Taking Yes Reported, Patient Yes    montelukast (SINGULAIR) 10 MG tablet Take 10 mg by mouth At Bedtime Taking Yes Reported, Patient Yes    potassium chloride ER (K-TAB) 20 MEQ CR tablet 40mEq every morning and 20Meq every evening Taking Yes Reported, Patient     sacubitril-valsartan (ENTRESTO)  MG per tablet Take 1 tablet by mouth 2 times daily Taking Yes Reported, Patient Yes    trospium (SANCTURA) 20 MG tablet Take 20 mg by mouth 2 times daily (before meals) Taking Yes Reported, Patient     vitamin B-12 (CYANOCOBALAMIN) 100 MCG tablet Take 100 mcg by mouth daily Taking Yes Unknown, Entered By History     Vitamin D3 (CHOLECALCIFEROL) 25 mcg (1000 units) tablet Take 25 mcg by mouth daily Taking Yes Unknown, Entered By History          Medication History Completed By:   Devin Sylvester, PharmD  PAC Pharmacist  [phone]

## 2023-05-31 NOTE — PHARMACY - PREOPERATIVE ASSESSMENT CENTER
Anticoagulation Note - Preoperative Assessment Center (PAC) Pharmacist     Patient was interviewed on May 31, 2023 prior to scheduled PAC clinic appointment. The purpose of this note is to document the perioperative anticoagulation plan outlined by the providers caring for Bird Kaur.     Current Regimen  Anticoagulation Regimen as of May 31, 2023: apixaban 5 mg BID  Indication: A-fib  Prescriber:  Alyx BAZAN  Expected Duration of therapy: indefinite      Creatinine   Date Value Ref Range Status   04/17/2023 1.27 (H) 0.67 - 1.17 mg/dL Final       Perioperative plan  Bird Kaur is scheduled for Cervical 3/4 osteophytectomy with anterior discectomy and fusion on 6/12/23 with Adrian Genao and Dwight, and the perioperative anticoagulation plan outlined by Adrian Genao and Dwight is hold apixaban x48 hrs, last dose 6/18pm.       Resumption of anticoagulation after procedure will be based on surgery team assessment of bleeding risks and complications.  This plan may require re-assessment and modification by his primary team in the perioperative setting depending on patients clinical situation.      Devin Sylvester, PharmD  PAC Pharmacist  [phone]   May 31, 2023  11:10 AM

## 2023-06-01 ENCOUNTER — ANESTHESIA EVENT (OUTPATIENT)
Dept: SURGERY | Facility: CLINIC | Age: 73
End: 2023-06-01

## 2023-06-01 ENCOUNTER — LAB (OUTPATIENT)
Dept: LAB | Facility: CLINIC | Age: 73
End: 2023-06-01
Payer: COMMERCIAL

## 2023-06-01 ENCOUNTER — OFFICE VISIT (OUTPATIENT)
Dept: SURGERY | Facility: CLINIC | Age: 73
End: 2023-06-01
Payer: COMMERCIAL

## 2023-06-01 ENCOUNTER — PRE VISIT (OUTPATIENT)
Dept: SURGERY | Facility: CLINIC | Age: 73
End: 2023-06-01

## 2023-06-01 VITALS
HEIGHT: 71 IN | DIASTOLIC BLOOD PRESSURE: 97 MMHG | RESPIRATION RATE: 16 BRPM | OXYGEN SATURATION: 96 % | WEIGHT: 272.3 LBS | HEART RATE: 61 BPM | SYSTOLIC BLOOD PRESSURE: 143 MMHG | TEMPERATURE: 97.9 F | BODY MASS INDEX: 38.12 KG/M2

## 2023-06-01 DIAGNOSIS — Z01.818 PRE-OP EVALUATION: Primary | ICD-10-CM

## 2023-06-01 DIAGNOSIS — Z01.818 PRE-OP EVALUATION: ICD-10-CM

## 2023-06-01 PROCEDURE — 36415 COLL VENOUS BLD VENIPUNCTURE: CPT | Performed by: PATHOLOGY

## 2023-06-01 PROCEDURE — 86900 BLOOD TYPING SEROLOGIC ABO: CPT | Mod: 90 | Performed by: PATHOLOGY

## 2023-06-01 PROCEDURE — 99000 SPECIMEN HANDLING OFFICE-LAB: CPT | Performed by: PATHOLOGY

## 2023-06-01 PROCEDURE — 86850 RBC ANTIBODY SCREEN: CPT | Mod: 90 | Performed by: PATHOLOGY

## 2023-06-01 PROCEDURE — 99215 OFFICE O/P EST HI 40 MIN: CPT | Performed by: PHYSICIAN ASSISTANT

## 2023-06-01 PROCEDURE — 86901 BLOOD TYPING SEROLOGIC RH(D): CPT | Mod: 90 | Performed by: PATHOLOGY

## 2023-06-01 ASSESSMENT — PAIN SCALES - GENERAL: PAINLEVEL: MODERATE PAIN (4)

## 2023-06-01 ASSESSMENT — ENCOUNTER SYMPTOMS
DYSRHYTHMIAS: 1
SEIZURES: 0

## 2023-06-01 ASSESSMENT — LIFESTYLE VARIABLES: TOBACCO_USE: 1

## 2023-06-01 NOTE — H&P
Pre-Operative H & P     CC:  Preoperative exam to assess for increased cardiopulmonary risk while undergoing surgery and anesthesia.    Date of Encounter: 6/1/2023  Primary Care Physician:  [facility]     Reason for visit:   Encounter Diagnosis   Name Primary?     Pre-op evaluation Yes       HPI  Bird Kaur is a 72 year old male who presents for pre-operative H & P in preparation for  Procedure Information     Date/Time: 6/21/23     Procedure: Cervical 3/4 osteophytectomy with anterior discectomy and fusion    Anesthesia type: general    Pre-op diagnosis: dysphagia    Location: [facility]    Providers: Dr. Genao and Dr. Quintanilla          Patient is being evaluated for comorbid conditions of hypertension, atrial fibrillation, CHF, asthma, former tobacco use, CHICA, Obesity    Mr. Kaur has had dysphagia for approximately 2 years.  He had a swallow study in January 2023 which showed flash penetration on thin liquids and nectar liquids, prominent osteophyte at C3-4 with mild focal narrowing of the esophagus.  He reports that his dysphagia is getting worse.  He was evaluated by both Dr. Genao and Dr. Quintanilla and surgical intervention is now scheduled as above.     History is obtained from the patient and chart review    Hx of abnormal bleeding or anti-platelet use: eliquis      Past Medical History  Past Medical History:   Diagnosis Date     Benign essential hypertension      Chronic atrial fibrillation (H)      Dysphagia      Heart failure with preserved ejection fraction, NYHA class I (H)      Obesity      CHICA (obstructive sleep apnea)        Past Surgical History  Past Surgical History:   Procedure Laterality Date     APPENDECTOMY      in his 20s     cervcial spine      surgery on c-spine, not a fusion     HERNIA REPAIR       LAPAROSCOPIC UNROOFING LIVER CYST  2016     MOHS MICROGRAPHIC PROCEDURE       TONSILLECTOMY &  ADENOIDECTOMY      childhood     TOTAL KNEE ARTHROPLASTY Left        Prior to Admission Medications  Current Outpatient Medications   Medication Sig Dispense Refill     apixaban ANTICOAGULANT (ELIQUIS) 5 MG tablet Take 5 mg by mouth 2 times daily       carboxymethylcellulose PF (REFRESH PLUS) 0.5 % ophthalmic solution Place 1 drop into both eyes 4 times daily as needed for dry eyes       dofetilide (TIKOSYN) 500 MCG capsule Take 500 mcg by mouth 2 times daily       empagliflozin (JARDIANCE) 25 MG TABS tablet Take 12.5 mg by mouth daily       finasteride (PROSCAR) 5 MG tablet Take 5 mg by mouth daily       fluticasone (FLONASE) 50 MCG/ACT nasal spray Spray 1 spray into both nostrils daily as needed for allergies       furosemide (LASIX) 40 MG tablet Take 40 mg by mouth daily       lovastatin (MEVACOR) 20 MG tablet Take 20 mg by mouth At Bedtime       metoprolol tartrate (LOPRESSOR) 100 MG tablet Take 100 mg by mouth 2 times daily       montelukast (SINGULAIR) 10 MG tablet Take 10 mg by mouth At Bedtime       potassium chloride ER (K-TAB) 20 MEQ CR tablet 40mEq every morning and 20Meq every evening       sacubitril-valsartan (ENTRESTO)  MG per tablet Take 1 tablet by mouth 2 times daily       trospium (SANCTURA) 20 MG tablet Take 20 mg by mouth 2 times daily (before meals)       vitamin B-12 (CYANOCOBALAMIN) 100 MCG tablet Take 100 mcg by mouth daily       Vitamin D3 (CHOLECALCIFEROL) 25 mcg (1000 units) tablet Take 25 mcg by mouth daily         Allergies  Allergies   Allergen Reactions     Amoxicillin      Flomax [Tamsulosin]      Lisinopril Unknown     Molds & Smuts Unknown     Morphine      Verapamil        Social History  Social History     Socioeconomic History     Marital status: Patient Declined     Spouse name: Not on file     Number of children: Not on file     Years of education: Not on file     Highest education level: Not on file   Occupational History     Not on file   Tobacco Use     Smoking  status: Former     Years: 5.00     Types: Cigarettes     Quit date:      Years since quittin.4     Passive exposure: Past     Smokeless tobacco: Never     Tobacco comments:     Quit smoking in  . Smoked 5 years in the . Usually one cigarette per day    Vaping Use     Vaping status: Not on file   Substance and Sexual Activity     Alcohol use: Not Currently     Drug use: Never     Sexual activity: Not on file   Other Topics Concern     Not on file   Social History Narrative     Not on file     Social Determinants of Health     Financial Resource Strain: Not on file   Food Insecurity: Not on file   Transportation Needs: Not on file   Physical Activity: Not on file   Stress: Not on file   Social Connections: Not on file   Intimate Partner Violence: Not on file   Housing Stability: Not on file       Family History  Family History   Problem Relation Age of Onset     Anesthesia Reaction No family hx of      Venous thrombosis No family hx of        Review of Systems  The complete review of systems is negative other than noted in the HPI or here.   Anesthesia Evaluation   Pt has had prior anesthetic.     History of anesthetic complications  - PONV.      ROS/MED HX  ENT/Pulmonary:     (+) sleep apnea, uses CPAP, tobacco use, Past use, Intermittent, asthma     Neurologic: Comment: tremors   (-) no seizures and no CVA   Cardiovascular:     (+) hypertension-----CHF Last EF: 45-50% date: 2020 dysrhythmias, a-fib, Previous cardiac testing   Echo: Date: 2020 Results:  EF 45-50%  Stress Test: Date:  Results:  Negative for ischemia, EF 60%  ECG Reviewed: Date: Results:    Cath: Date: Results:      METS/Exercise Tolerance: 4 - Raking leaves, gardening Comment: Limited. Longstanding history of MCNULTY.  Mowed lawn last week and takes breaks as needed. Reports back, hip and foot pain is the major limiting factor, not MCNULTY   Hematologic:  - neg hematologic  ROS  (-) history of blood clots and history of blood  "transfusion   Musculoskeletal:       GI/Hepatic: Comment: Dysphagia       Renal/Genitourinary:  - neg Renal ROS     Endo:     (+) Obesity (BMI 38),  (-) chronic steroid usage   Psychiatric/Substance Use:  - neg psychiatric ROS     Infectious Disease:  - neg infectious disease ROS     Malignancy:  - neg malignancy ROS     Other:            BP (!) 143/97 (BP Location: Right arm, Patient Position: Sitting, Cuff Size: Adult Large)   Pulse 61   Temp 97.9  F (36.6  C) (Oral)   Resp 16   Ht 1.803 m (5' 11\")   Wt 123.5 kg (272 lb 4.8 oz)   SpO2 96%   BMI 37.98 kg/m      Physical Exam   Constitutional: Awake, alert, cooperative, no apparent distress, and appears stated age.  Eyes: Pupils equal, round and reactive to light, extra ocular muscles intact, sclera clear, conjunctiva normal.  HENT: Normocephalic, oral pharynx with moist mucus membranes, fair dentition.  Respiratory: Clear to auscultation bilaterally, no crackles or wheezing.  Cardiovascular: Regular rate and rhythm, normal S1 and S2, and no murmur noted.  Wearing compression stockings. Palpable pulses to radial arteries.   GI: Normal bowel sounds, soft, non-distended, non-tender, no masses palpated, obese  Genitourinary:  defered  Skin: Warm and dry.    Musculoskeletal: limited ROM of neck. There is no redness, warmth, or swelling of the exposed joints.   Neurologic: Awake, alert, oriented to name, place and time. Cranial nerves II-XII are grossly intact. Tremors upper and lower extremities  Neuropsychiatric: Calm, cooperative. Normal affect.     Prior Labs/Diagnostic Studies   All labs and imaging personally reviewed     EKG/ stress test - if available please see in ROS above   No results found.       View : No data to display.                  The patient's records and results personally reviewed by this provider.     [facility]    LAB/DIAGNOSTIC STUDIES TODAY:  T&S    Assessment      Bird Kaur is a 72 year old male seen " as a PAC referral for risk assessment and optimization for anesthesia.    Plan/Recommendations  Pt will be optimized for the proposed procedure.  See below for details on the assessment, risk, and preoperative recommendations    NEUROLOGY  - No history of TIA, CVA or seizure  -Post Op delirium risk factors:  No risk identified    ENT  Flexible fiberoptic laryngoscopy 4/3/23: Scope exam was indicated due to dysphagia. Verbal consent was obtained. The nasal cavity was prepped with an aerosolized solution of topical anesthetic and vasoconstrictive agent. The scope was passed through the anterior nasal cavity and advanced. Inspection of the nasopharynx revealed no gross abnormality. The base of tongue and vallecula are normal. There is prominence of the posterior pharyngeal wall just above the level of the epiglottis. The epiglottis, AE folds, false cords, true cords, arytenoids are normal. Inspection of the larynx revealed bilaterally mobile vocal cords. Pyriform sinuses are symmetric. The airway is patent. Procedure tolerated well with no immediate complications noted.  Mallampati: III  TM: > 3    CARDIAC  -hypertension  -atrial fibrillation using tikosyn, eliquis  -HFpEF, EF 45-50% on echo 8/2020  -[facility]. At that time, cardiology recommended future cMR to rule out infiltrative process, but will defer until after the above procedure is completed.  He reports [facility] and follow up echo was recommended prior to surgery, but not yet complete. He will be seeing his cardiologist, Sharon Monreal 6/14 for follow up prior to surgery. We will reach out to her to ask that echo results and her note are faxed here once complete   - METS (Metabolic Equivalents)  Patient CANNOT perform 4 METS exercise without symptoms            Total Score: 1    Functional Capacity: Unable to complete 4 METS      RCRI-Low risk: Class 2 0.9% complication rate            Total  "Score: 1    RCRI: CHF        PULMONARY  - Obstructive Sleep Apnea  CHICA with home CPAP.  Patient will be instructed to bring their home CPAP device to the hospital with them.  - Asthma  allergy related, controlled  - Tobacco History      History   Smoking Status     Former     Years: 5.00     Types: Cigarettes     Quit date: 1978   Smokeless Tobacco     Never       GI  PONV High Risk  Total Score: 3           1 AN PONV: Patient is not a current smoker    1 AN PONV: Patient has history of PONV    1 AN PONV: Intended Post Op Opioids        /RENAL  - Baseline Creatinine 1.3    ENDOCRINE    - BMI: Estimated body mass index is 37.98 kg/m  as calculated from the following:    Height as of this encounter: 1.803 m (5' 11\").    Weight as of this encounter: 123.5 kg (272 lb 4.8 oz).  Obesity (BMI >30)  - No history of Diabetes Mellitus    HEME  VTE Low Risk 0.5%            Total Score: 3    VTE: Greater than 59 yrs old    VTE: Male      - Coagulopathy second to Apixaban (Eliquis)  -T&S to be updated today  -elevated RBCs.  [facility]- Reviewed labs from March - the elevated RBC's is compensation to transport O2 due to underlying cardiac issue. Recommends the patient come to clinic in 1-3 weeks for labs which will be reviewed by a hematologist.  If anything else is recommended by the hematologist, they will reach out to the patient.     This will not delay surgery though.       Different anesthesia methods/types have been discussed with the patient, but they are aware that the final plan will be decided by the assigned anesthesia provider on the date of service.  Patient was discussed with Dr. Knox    The patient is optimized for their procedure, pending cardiac [facility] process [facility] the [facility]. AVS with information on surgery time/arrival time, meds and NPO status given by nursing staff. No further diagnostic testing indicated.    Addendum 6/19/23: Our team [facility] the [facility] stating they will " [facility] stable echo and cardiology note including a message about cardiac clearance for the above procedure. As stated, the updated echo was stable from previous.       On the day of service:     Prep time: 18 minutes  Visit time: 25 minutes  Documentation time: 10 minutes  ------------------------------------------  Total time: 53 minutes      Yoon Montoya PA-C  Preoperative Assessment Center  [facility]  Clinic and Surgery Center  Phone: [phone]  Fax: [phone]

## 2023-06-01 NOTE — PATIENT INSTRUCTIONS
Preparing for Your Surgery      Name:  Bird Kaur   MRN:  [id number]   :  1950   Today's Date:  2023       Arriving for surgery:  Surgery date: 23  Arrival time:  5.30AM    Please come to:     [facility] [address]  [address] [place]  [address]    -   Parking is available in the Patient Visitor Ramp on [address] and [facility].     -   When entering the hospital please stop at Registration, you will be directed to the 3rd floor Surgery waiting room.     -   Please ask if you need an escort or a wheelchair to the Surgery Waiting Room.  Preop number- [phone]      What can I eat or drink?  -  You may eat and drink normally up to 8 hours prior to arrival time. (Until 9.30PM)  -  You may have clear liquids until 2 hours prior to arrival time. (Until 3.30AM)    Examples of clear liquids:  Water  Clear broth  Juices (apple, white grape, white cranberry  and cider) without pulp  Noncarbonated, powder based beverages  (lemonade and Harsh-Aid)  Sodas (Sprite, 7-Up, ginger ale and seltzer)  Coffee or tea (without milk or cream)  Gatorade    -  No Alcohol or cannabis products for at least 24 hours before surgery.     Which medicines can I take?    Hold Aspirin for 7 days before surgery.   Hold Multivitamins for 7 days before surgery. (Vitamin B12 and D3).  Hold Supplements for 7 days before surgery. (K-tab)  Hold Ibuprofen (Advil, Motrin) for 1 day(s) before surgery--unless otherwise directed by surgeon.  Hold Naproxen (Aleve) for 4 days before surgery.    Hold Eliquis for 48hours before surgery. Last dose is on 23.  Hold Jardiance for 3 days before your surgery.  Take your evening/bedtime medications per usual.    -  DO NOT take these medications the day of surgery:  Furosemide (Lasix), Entresto.    -  PLEASE TAKE these medications the day of surgery:  Eye drops (as needed), Dofetilide (Tikosyn), Finasteride (Proscar),    Flonase (as needed), Metoprolol (Lopressor), Trospium (Sanctura).      How do I prepare myself?  - Please take 2 showers (one the night prior to surgery and one the morning of surgery) using Scrubcare or Hibiclens soap.    Use this soap only from the neck to your toes.     Leave the soap on your skin for one minute--then rinse thoroughly.      You may use your own shampoo and conditioner. No other hair products.   - Please remove all jewelry and body piercings.  - No lotions, deodorants or fragrance.  - No makeup or fingernail polish.   - Bring your ID and insurance card.    -If you have a Deep Brain Stimulator, Spinal Cord Stimulator, or any Neuro Stimulator device---you must bring the remote control to the hospital.      ALL PATIENTS GOING HOME THE SAME DAY OF SURGERY ARE REQUIRED TO HAVE A RESPONSIBLE ADULT TO DRIVE AND BE IN ATTENDANCE WITH THEM FOR 24 HOURS FOLLOWING SURGERY.    Covid testing policy as of 12/06/2022  Your surgeon will notify and schedule you for a COVID test if one is needed before surgery--please direct any questions or COVID symptoms to your surgeon      Questions or Concerns:    - For any questions regarding the day of surgery or your hospital stay, please contact the Pre Admission Nursing Office at [phone].       - If you have health changes between today and your surgery, please call your surgeon.       - For questions after surgery, please call your surgeons office.           Current Visitor Guidelines     Visiting hours: 8 a.m. to 8:30 p.m.     Patients confirmed or suspected to have symptoms of COVID 19 or flu:     No visitors allowed for adult patients.   Children (under age 18) can have 1 named visitor.     People who are sick or showing symptoms of COVID 19 or flu:    Are not allowed to visit patients--we can only make exceptions in special situations.       Please follow these guidelines for your visit:          Please maintain social distance          Masking is  optional--however at times you may be asked to wear a mask for the safety of yourself and others     Clean your hands with alcohol hand . Do this when you arrive at and leave the building and patient room,    And again after you touch your mask or anything in the room.     Go directly to and from the room you are visiting.     Stay in the patient s room during your visit. Limit going to other places in the hospital as much as possible     Leave bags and jackets at home or in the car.     For everyone s health, please don t come and go during your visit. That includes for smoking   during your visit.

## 2023-06-13 ENCOUNTER — TRANSFERRED RECORDS (OUTPATIENT)
Dept: HEALTH INFORMATION MANAGEMENT | Facility: CLINIC | Age: 73
End: 2023-06-13
Payer: COMMERCIAL

## 2023-06-14 ENCOUNTER — TRANSFERRED RECORDS (OUTPATIENT)
Dept: HEALTH INFORMATION MANAGEMENT | Facility: CLINIC | Age: 73
End: 2023-06-14
Payer: COMMERCIAL

## 2023-06-20 ENCOUNTER — ANESTHESIA EVENT (OUTPATIENT)
Dept: SURGERY | Facility: CLINIC | Age: 73
DRG: 471 | End: 2023-06-20
Payer: COMMERCIAL

## 2023-06-21 ENCOUNTER — APPOINTMENT (OUTPATIENT)
Dept: GENERAL RADIOLOGY | Facility: CLINIC | Age: 73
DRG: 471 | End: 2023-06-21
Attending: NEUROLOGICAL SURGERY
Payer: COMMERCIAL

## 2023-06-21 ENCOUNTER — HOSPITAL ENCOUNTER (INPATIENT)
Facility: CLINIC | Age: 73
LOS: 2 days | Discharge: HOME | DRG: 471 | End: 2023-06-23
Attending: NEUROLOGICAL SURGERY | Admitting: NEUROLOGICAL SURGERY
Payer: COMMERCIAL

## 2023-06-21 ENCOUNTER — ANESTHESIA (OUTPATIENT)
Dept: SURGERY | Facility: CLINIC | Age: 73
DRG: 471 | End: 2023-06-21
Payer: COMMERCIAL

## 2023-06-21 DIAGNOSIS — R13.10 DYSPHAGIA, UNSPECIFIED TYPE: ICD-10-CM

## 2023-06-21 DIAGNOSIS — Z98.1 S/P CERVICAL SPINAL FUSION: Primary | ICD-10-CM

## 2023-06-21 LAB
ANION GAP SERPL CALCULATED.3IONS-SCNC: 10 MMOL/L (ref 7–15)
ATRIAL RATE - MUSE: 72 BPM
BUN SERPL-MCNC: 17.3 MG/DL (ref 8–23)
CALCIUM SERPL-MCNC: 8.8 MG/DL (ref 8.8–10.2)
CHLORIDE SERPL-SCNC: 109 MMOL/L (ref 98–107)
CREAT SERPL-MCNC: 1.2 MG/DL (ref 0.67–1.17)
DEPRECATED HCO3 PLAS-SCNC: 24 MMOL/L (ref 22–29)
DIASTOLIC BLOOD PRESSURE - MUSE: NORMAL MMHG
ERYTHROCYTE [DISTWIDTH] IN BLOOD BY AUTOMATED COUNT: 13.3 % (ref 10–15)
GFR SERPL CREATININE-BSD FRML MDRD: 65 ML/MIN/1.73M2
GLUCOSE BLDC GLUCOMTR-MCNC: 113 MG/DL (ref 70–99)
GLUCOSE BLDC GLUCOMTR-MCNC: 113 MG/DL (ref 70–99)
GLUCOSE BLDC GLUCOMTR-MCNC: 115 MG/DL (ref 70–99)
GLUCOSE SERPL-MCNC: 99 MG/DL (ref 70–99)
HBA1C MFR BLD: 5.3 %
HCT VFR BLD AUTO: 52 % (ref 40–53)
HGB BLD-MCNC: 17.5 G/DL (ref 13.3–17.7)
INTERPRETATION ECG - MUSE: NORMAL
MAGNESIUM SERPL-MCNC: 2 MG/DL (ref 1.7–2.3)
MCH RBC QN AUTO: 30.1 PG (ref 26.5–33)
MCHC RBC AUTO-ENTMCNC: 33.7 G/DL (ref 31.5–36.5)
MCV RBC AUTO: 89 FL (ref 78–100)
P AXIS - MUSE: 83 DEGREES
PLATELET # BLD AUTO: 155 10E3/UL (ref 150–450)
POTASSIUM SERPL-SCNC: 3.6 MMOL/L (ref 3.4–5.3)
POTASSIUM SERPL-SCNC: 3.6 MMOL/L (ref 3.4–5.3)
PR INTERVAL - MUSE: 210 MS
QRS DURATION - MUSE: 80 MS
QT - MUSE: 402 MS
QTC - MUSE: 440 MS
R AXIS - MUSE: 65 DEGREES
RBC # BLD AUTO: 5.82 10E6/UL (ref 4.4–5.9)
SODIUM SERPL-SCNC: 143 MMOL/L (ref 136–145)
SYSTOLIC BLOOD PRESSURE - MUSE: NORMAL MMHG
T AXIS - MUSE: 32 DEGREES
VENTRICULAR RATE- MUSE: 72 BPM
WBC # BLD AUTO: 5 10E3/UL (ref 4–11)

## 2023-06-21 PROCEDURE — 710N000009 HC RECOVERY PHASE 1, LEVEL 1, PER MIN: Performed by: NEUROLOGICAL SURGERY

## 2023-06-21 PROCEDURE — 250N000013 HC RX MED GY IP 250 OP 250 PS 637: Performed by: STUDENT IN AN ORGANIZED HEALTH CARE EDUCATION/TRAINING PROGRAM

## 2023-06-21 PROCEDURE — 999N000179 XR SURGERY CARM FLUORO LESS THAN 5 MIN W STILLS: Mod: TC

## 2023-06-21 PROCEDURE — 36415 COLL VENOUS BLD VENIPUNCTURE: CPT | Performed by: STUDENT IN AN ORGANIZED HEALTH CARE EDUCATION/TRAINING PROGRAM

## 2023-06-21 PROCEDURE — 22853 INSJ BIOMECHANICAL DEVICE: CPT | Performed by: NEUROLOGICAL SURGERY

## 2023-06-21 PROCEDURE — 36415 COLL VENOUS BLD VENIPUNCTURE: CPT | Performed by: ANESTHESIOLOGY

## 2023-06-21 PROCEDURE — 85027 COMPLETE CBC AUTOMATED: CPT | Performed by: ANESTHESIOLOGY

## 2023-06-21 PROCEDURE — 370N000017 HC ANESTHESIA TECHNICAL FEE, PER MIN: Performed by: NEUROLOGICAL SURGERY

## 2023-06-21 PROCEDURE — 22551 ARTHRD ANT NTRBDY CERVICAL: CPT | Mod: 62 | Performed by: NEUROLOGICAL SURGERY

## 2023-06-21 PROCEDURE — 36415 COLL VENOUS BLD VENIPUNCTURE: CPT | Performed by: NEUROLOGICAL SURGERY

## 2023-06-21 PROCEDURE — 93005 ELECTROCARDIOGRAM TRACING: CPT

## 2023-06-21 PROCEDURE — 258N000003 HC RX IP 258 OP 636: Performed by: ANESTHESIOLOGY

## 2023-06-21 PROCEDURE — 0RG10A0 FUSION OF CERVICAL VERTEBRAL JOINT WITH INTERBODY FUSION DEVICE, ANTERIOR APPROACH, ANTERIOR COLUMN, OPEN APPROACH: ICD-10-PCS | Performed by: NEUROLOGICAL SURGERY

## 2023-06-21 PROCEDURE — 250N000011 HC RX IP 250 OP 636: Mod: JZ | Performed by: NEUROLOGICAL SURGERY

## 2023-06-21 PROCEDURE — 272N000001 HC OR GENERAL SUPPLY STERILE: Performed by: NEUROLOGICAL SURGERY

## 2023-06-21 PROCEDURE — C1762 CONN TISS, HUMAN(INC FASCIA): HCPCS | Performed by: NEUROLOGICAL SURGERY

## 2023-06-21 PROCEDURE — 250N000011 HC RX IP 250 OP 636: Mod: JZ

## 2023-06-21 PROCEDURE — 278N000051 HC OR IMPLANT GENERAL: Performed by: NEUROLOGICAL SURGERY

## 2023-06-21 PROCEDURE — 20930 SP BONE ALGRFT MORSEL ADD-ON: CPT | Performed by: NEUROLOGICAL SURGERY

## 2023-06-21 PROCEDURE — C1713 ANCHOR/SCREW BN/BN,TIS/BN: HCPCS | Performed by: NEUROLOGICAL SURGERY

## 2023-06-21 PROCEDURE — 999N000141 HC STATISTIC PRE-PROCEDURE NURSING ASSESSMENT: Performed by: NEUROLOGICAL SURGERY

## 2023-06-21 PROCEDURE — 250N000011 HC RX IP 250 OP 636: Mod: JZ | Performed by: NURSE ANESTHETIST, CERTIFIED REGISTERED

## 2023-06-21 PROCEDURE — 84132 ASSAY OF SERUM POTASSIUM: CPT | Performed by: NEUROLOGICAL SURGERY

## 2023-06-21 PROCEDURE — 22551 ARTHRD ANT NTRBDY CERVICAL: CPT | Mod: 62 | Performed by: OTOLARYNGOLOGY

## 2023-06-21 PROCEDURE — 83036 HEMOGLOBIN GLYCOSYLATED A1C: CPT | Performed by: STUDENT IN AN ORGANIZED HEALTH CARE EDUCATION/TRAINING PROGRAM

## 2023-06-21 PROCEDURE — 250N000009 HC RX 250: Performed by: ANESTHESIOLOGY

## 2023-06-21 PROCEDURE — 4A11X4G MONITORING OF PERIPHERAL NERVOUS ELECTRICAL ACTIVITY, INTRAOPERATIVE, EXTERNAL APPROACH: ICD-10-PCS | Performed by: NEUROLOGICAL SURGERY

## 2023-06-21 PROCEDURE — 258N000003 HC RX IP 258 OP 636: Performed by: STUDENT IN AN ORGANIZED HEALTH CARE EDUCATION/TRAINING PROGRAM

## 2023-06-21 PROCEDURE — 250N000009 HC RX 250

## 2023-06-21 PROCEDURE — 22845 INSERT SPINE FIXATION DEVICE: CPT | Mod: 59 | Performed by: NEUROLOGICAL SURGERY

## 2023-06-21 PROCEDURE — 360N000085 HC SURGERY LEVEL 5 W/ FLUORO, PER MIN: Performed by: NEUROLOGICAL SURGERY

## 2023-06-21 PROCEDURE — 250N000009 HC RX 250: Performed by: NURSE ANESTHETIST, CERTIFIED REGISTERED

## 2023-06-21 PROCEDURE — 250N000013 HC RX MED GY IP 250 OP 250 PS 637: Performed by: ANESTHESIOLOGY

## 2023-06-21 PROCEDURE — 120N000002 HC R&B MED SURG/OB UMMC

## 2023-06-21 PROCEDURE — 250N000009 HC RX 250: Performed by: NEUROLOGICAL SURGERY

## 2023-06-21 PROCEDURE — 250N000011 HC RX IP 250 OP 636: Mod: JZ | Performed by: STUDENT IN AN ORGANIZED HEALTH CARE EDUCATION/TRAINING PROGRAM

## 2023-06-21 PROCEDURE — 87635 SARS-COV-2 COVID-19 AMP PRB: CPT | Performed by: NEUROLOGICAL SURGERY

## 2023-06-21 PROCEDURE — 250N000011 HC RX IP 250 OP 636: Mod: JZ | Performed by: ANESTHESIOLOGY

## 2023-06-21 PROCEDURE — 83735 ASSAY OF MAGNESIUM: CPT | Performed by: NEUROLOGICAL SURGERY

## 2023-06-21 PROCEDURE — 80048 BASIC METABOLIC PNL TOTAL CA: CPT | Performed by: ANESTHESIOLOGY

## 2023-06-21 PROCEDURE — 999N000054 HC STATISTIC EKG NON-CHARGEABLE

## 2023-06-21 PROCEDURE — 258N000003 HC RX IP 258 OP 636: Performed by: NURSE ANESTHETIST, CERTIFIED REGISTERED

## 2023-06-21 PROCEDURE — 0PB30ZZ EXCISION OF CERVICAL VERTEBRA, OPEN APPROACH: ICD-10-PCS | Performed by: NEUROLOGICAL SURGERY

## 2023-06-21 PROCEDURE — 0RT30ZZ RESECTION OF CERVICAL VERTEBRAL DISC, OPEN APPROACH: ICD-10-PCS | Performed by: NEUROLOGICAL SURGERY

## 2023-06-21 DEVICE — IMPLANTABLE DEVICE: Type: IMPLANTABLE DEVICE | Site: SPINE CERVICAL | Status: FUNCTIONAL

## 2023-06-21 DEVICE — GRAFT BONE PUTTY GRAFTON DBM 1ML T43102: Type: IMPLANTABLE DEVICE | Site: SPINE CERVICAL | Status: FUNCTIONAL

## 2023-06-21 RX ORDER — HYDRALAZINE HYDROCHLORIDE 20 MG/ML
10-20 INJECTION INTRAMUSCULAR; INTRAVENOUS EVERY 30 MIN PRN
Status: DISCONTINUED | OUTPATIENT
Start: 2023-06-21 | End: 2023-06-23 | Stop reason: HOSPADM

## 2023-06-21 RX ORDER — METOPROLOL TARTRATE 1 MG/ML
5 INJECTION, SOLUTION INTRAVENOUS EVERY 4 HOURS
Status: DISCONTINUED | OUTPATIENT
Start: 2023-06-21 | End: 2023-06-21

## 2023-06-21 RX ORDER — CLINDAMYCIN PHOSPHATE 900 MG/50ML
900 INJECTION, SOLUTION INTRAVENOUS
Status: COMPLETED | OUTPATIENT
Start: 2023-06-21 | End: 2023-06-21

## 2023-06-21 RX ORDER — PROCHLORPERAZINE MALEATE 5 MG
5 TABLET ORAL EVERY 6 HOURS PRN
Status: DISCONTINUED | OUTPATIENT
Start: 2023-06-21 | End: 2023-06-21

## 2023-06-21 RX ORDER — SODIUM CHLORIDE, SODIUM LACTATE, POTASSIUM CHLORIDE, CALCIUM CHLORIDE 600; 310; 30; 20 MG/100ML; MG/100ML; MG/100ML; MG/100ML
INJECTION, SOLUTION INTRAVENOUS CONTINUOUS PRN
Status: DISCONTINUED | OUTPATIENT
Start: 2023-06-21 | End: 2023-06-21

## 2023-06-21 RX ORDER — CLINDAMYCIN PHOSPHATE 900 MG/50ML
900 INJECTION, SOLUTION INTRAVENOUS SEE ADMIN INSTRUCTIONS
Status: DISCONTINUED | OUTPATIENT
Start: 2023-06-21 | End: 2023-06-21 | Stop reason: HOSPADM

## 2023-06-21 RX ORDER — NALOXONE HYDROCHLORIDE 0.4 MG/ML
0.2 INJECTION, SOLUTION INTRAMUSCULAR; INTRAVENOUS; SUBCUTANEOUS
Status: DISCONTINUED | OUTPATIENT
Start: 2023-06-21 | End: 2023-06-23 | Stop reason: HOSPADM

## 2023-06-21 RX ORDER — SODIUM CHLORIDE, SODIUM LACTATE, POTASSIUM CHLORIDE, CALCIUM CHLORIDE 600; 310; 30; 20 MG/100ML; MG/100ML; MG/100ML; MG/100ML
INJECTION, SOLUTION INTRAVENOUS CONTINUOUS
Status: DISCONTINUED | OUTPATIENT
Start: 2023-06-21 | End: 2023-06-21 | Stop reason: HOSPADM

## 2023-06-21 RX ORDER — CEFAZOLIN SODIUM 2 G/100ML
2 INJECTION, SOLUTION INTRAVENOUS EVERY 8 HOURS
Status: COMPLETED | OUTPATIENT
Start: 2023-06-21 | End: 2023-06-21

## 2023-06-21 RX ORDER — POLYETHYLENE GLYCOL 3350 17 G/17G
17 POWDER, FOR SOLUTION ORAL DAILY
Status: DISCONTINUED | OUTPATIENT
Start: 2023-06-22 | End: 2023-06-23 | Stop reason: HOSPADM

## 2023-06-21 RX ORDER — LIDOCAINE HYDROCHLORIDE 20 MG/ML
INJECTION, SOLUTION INFILTRATION; PERINEURAL PRN
Status: DISCONTINUED | OUTPATIENT
Start: 2023-06-21 | End: 2023-06-21

## 2023-06-21 RX ORDER — LABETALOL HYDROCHLORIDE 5 MG/ML
5-10 INJECTION, SOLUTION INTRAVENOUS
Status: DISCONTINUED | OUTPATIENT
Start: 2023-06-21 | End: 2023-06-21 | Stop reason: HOSPADM

## 2023-06-21 RX ORDER — BISACODYL 10 MG
10 SUPPOSITORY, RECTAL RECTAL DAILY PRN
Status: DISCONTINUED | OUTPATIENT
Start: 2023-06-21 | End: 2023-06-23 | Stop reason: HOSPADM

## 2023-06-21 RX ORDER — METOPROLOL TARTRATE 1 MG/ML
10 INJECTION, SOLUTION INTRAVENOUS EVERY 6 HOURS
Status: DISCONTINUED | OUTPATIENT
Start: 2023-06-21 | End: 2023-06-22

## 2023-06-21 RX ORDER — NALOXONE HYDROCHLORIDE 0.4 MG/ML
0.4 INJECTION, SOLUTION INTRAMUSCULAR; INTRAVENOUS; SUBCUTANEOUS
Status: DISCONTINUED | OUTPATIENT
Start: 2023-06-21 | End: 2023-06-23 | Stop reason: HOSPADM

## 2023-06-21 RX ORDER — PROPOFOL 10 MG/ML
INJECTION, EMULSION INTRAVENOUS CONTINUOUS PRN
Status: DISCONTINUED | OUTPATIENT
Start: 2023-06-21 | End: 2023-06-21

## 2023-06-21 RX ORDER — HYDRALAZINE HYDROCHLORIDE 20 MG/ML
2.5-5 INJECTION INTRAMUSCULAR; INTRAVENOUS EVERY 10 MIN PRN
Status: DISCONTINUED | OUTPATIENT
Start: 2023-06-21 | End: 2023-06-21 | Stop reason: HOSPADM

## 2023-06-21 RX ORDER — METOPROLOL TARTRATE 50 MG
100 TABLET ORAL 2 TIMES DAILY
Status: DISCONTINUED | OUTPATIENT
Start: 2023-06-21 | End: 2023-06-23 | Stop reason: HOSPADM

## 2023-06-21 RX ORDER — FENTANYL CITRATE 50 UG/ML
25 INJECTION, SOLUTION INTRAMUSCULAR; INTRAVENOUS EVERY 5 MIN PRN
Status: DISCONTINUED | OUTPATIENT
Start: 2023-06-21 | End: 2023-06-21 | Stop reason: HOSPADM

## 2023-06-21 RX ORDER — ONDANSETRON 4 MG/1
4 TABLET, ORALLY DISINTEGRATING ORAL EVERY 6 HOURS PRN
Status: DISCONTINUED | OUTPATIENT
Start: 2023-06-21 | End: 2023-06-23 | Stop reason: HOSPADM

## 2023-06-21 RX ORDER — ACETAMINOPHEN 325 MG/1
975 TABLET ORAL EVERY 8 HOURS
Status: DISCONTINUED | OUTPATIENT
Start: 2023-06-21 | End: 2023-06-23 | Stop reason: HOSPADM

## 2023-06-21 RX ORDER — ACETAMINOPHEN 325 MG/1
650 TABLET ORAL EVERY 4 HOURS PRN
Status: DISCONTINUED | OUTPATIENT
Start: 2023-06-24 | End: 2023-06-23 | Stop reason: HOSPADM

## 2023-06-21 RX ORDER — MONTELUKAST SODIUM 10 MG/1
10 TABLET ORAL AT BEDTIME
Status: DISCONTINUED | OUTPATIENT
Start: 2023-06-21 | End: 2023-06-23 | Stop reason: HOSPADM

## 2023-06-21 RX ORDER — SODIUM CHLORIDE 9 MG/ML
INJECTION, SOLUTION INTRAVENOUS CONTINUOUS
Status: DISCONTINUED | OUTPATIENT
Start: 2023-06-21 | End: 2023-06-22

## 2023-06-21 RX ORDER — METOPROLOL TARTRATE 50 MG
100 TABLET ORAL ONCE
Status: COMPLETED | OUTPATIENT
Start: 2023-06-21 | End: 2023-06-21

## 2023-06-21 RX ORDER — AMOXICILLIN 250 MG
1 CAPSULE ORAL 2 TIMES DAILY
Status: DISCONTINUED | OUTPATIENT
Start: 2023-06-21 | End: 2023-06-23 | Stop reason: HOSPADM

## 2023-06-21 RX ORDER — ONDANSETRON 4 MG/1
4 TABLET, ORALLY DISINTEGRATING ORAL EVERY 30 MIN PRN
Status: DISCONTINUED | OUTPATIENT
Start: 2023-06-21 | End: 2023-06-21 | Stop reason: HOSPADM

## 2023-06-21 RX ORDER — ACETAMINOPHEN 325 MG/1
975 TABLET ORAL ONCE
Status: COMPLETED | OUTPATIENT
Start: 2023-06-21 | End: 2023-06-21

## 2023-06-21 RX ORDER — POTASSIUM CHLORIDE 750 MG/1
20 CAPSULE, EXTENDED RELEASE ORAL 2 TIMES DAILY
Status: DISCONTINUED | OUTPATIENT
Start: 2023-06-21 | End: 2023-06-23 | Stop reason: HOSPADM

## 2023-06-21 RX ORDER — DOFETILIDE 0.5 MG/1
500 CAPSULE ORAL 2 TIMES DAILY
Status: DISCONTINUED | OUTPATIENT
Start: 2023-06-21 | End: 2023-06-23 | Stop reason: HOSPADM

## 2023-06-21 RX ORDER — MEPERIDINE HYDROCHLORIDE 25 MG/ML
12.5 INJECTION INTRAMUSCULAR; INTRAVENOUS; SUBCUTANEOUS EVERY 5 MIN PRN
Status: DISCONTINUED | OUTPATIENT
Start: 2023-06-21 | End: 2023-06-21 | Stop reason: HOSPADM

## 2023-06-21 RX ORDER — FENTANYL CITRATE 50 UG/ML
INJECTION, SOLUTION INTRAMUSCULAR; INTRAVENOUS PRN
Status: DISCONTINUED | OUTPATIENT
Start: 2023-06-21 | End: 2023-06-21

## 2023-06-21 RX ORDER — CEFAZOLIN SODIUM 1 G/50ML
2 SOLUTION INTRAVENOUS EVERY 8 HOURS
Status: DISCONTINUED | OUTPATIENT
Start: 2023-06-21 | End: 2023-06-21

## 2023-06-21 RX ORDER — LIDOCAINE 40 MG/G
CREAM TOPICAL
Status: DISCONTINUED | OUTPATIENT
Start: 2023-06-21 | End: 2023-06-21 | Stop reason: HOSPADM

## 2023-06-21 RX ORDER — FINASTERIDE 5 MG/1
5 TABLET, FILM COATED ORAL EVERY EVENING
Status: DISCONTINUED | OUTPATIENT
Start: 2023-06-21 | End: 2023-06-23 | Stop reason: HOSPADM

## 2023-06-21 RX ORDER — HYDROMORPHONE HCL IN WATER/PF 6 MG/30 ML
0.4 PATIENT CONTROLLED ANALGESIA SYRINGE INTRAVENOUS EVERY 5 MIN PRN
Status: DISCONTINUED | OUTPATIENT
Start: 2023-06-21 | End: 2023-06-21 | Stop reason: HOSPADM

## 2023-06-21 RX ORDER — OXYCODONE HYDROCHLORIDE 10 MG/1
10 TABLET ORAL EVERY 4 HOURS PRN
Status: DISCONTINUED | OUTPATIENT
Start: 2023-06-21 | End: 2023-06-23 | Stop reason: HOSPADM

## 2023-06-21 RX ORDER — LIDOCAINE HYDROCHLORIDE AND EPINEPHRINE 10; 10 MG/ML; UG/ML
INJECTION, SOLUTION INFILTRATION; PERINEURAL PRN
Status: DISCONTINUED | OUTPATIENT
Start: 2023-06-21 | End: 2023-06-21 | Stop reason: HOSPADM

## 2023-06-21 RX ORDER — ONDANSETRON 2 MG/ML
INJECTION INTRAMUSCULAR; INTRAVENOUS PRN
Status: DISCONTINUED | OUTPATIENT
Start: 2023-06-21 | End: 2023-06-21

## 2023-06-21 RX ORDER — LIDOCAINE 40 MG/G
CREAM TOPICAL
Status: DISCONTINUED | OUTPATIENT
Start: 2023-06-21 | End: 2023-06-23 | Stop reason: HOSPADM

## 2023-06-21 RX ORDER — DEXAMETHASONE SODIUM PHOSPHATE 4 MG/ML
4 INJECTION, SOLUTION INTRA-ARTICULAR; INTRALESIONAL; INTRAMUSCULAR; INTRAVENOUS; SOFT TISSUE
Status: DISCONTINUED | OUTPATIENT
Start: 2023-06-21 | End: 2023-06-21 | Stop reason: HOSPADM

## 2023-06-21 RX ORDER — PROPOFOL 10 MG/ML
INJECTION, EMULSION INTRAVENOUS PRN
Status: DISCONTINUED | OUTPATIENT
Start: 2023-06-21 | End: 2023-06-21

## 2023-06-21 RX ORDER — HYDROMORPHONE HCL IN WATER/PF 6 MG/30 ML
0.2 PATIENT CONTROLLED ANALGESIA SYRINGE INTRAVENOUS EVERY 5 MIN PRN
Status: DISCONTINUED | OUTPATIENT
Start: 2023-06-21 | End: 2023-06-21 | Stop reason: HOSPADM

## 2023-06-21 RX ORDER — PRAVASTATIN SODIUM 10 MG
20 TABLET ORAL DAILY
Status: DISCONTINUED | OUTPATIENT
Start: 2023-06-22 | End: 2023-06-23 | Stop reason: HOSPADM

## 2023-06-21 RX ORDER — DEXAMETHASONE SODIUM PHOSPHATE 4 MG/ML
INJECTION, SOLUTION INTRA-ARTICULAR; INTRALESIONAL; INTRAMUSCULAR; INTRAVENOUS; SOFT TISSUE PRN
Status: DISCONTINUED | OUTPATIENT
Start: 2023-06-21 | End: 2023-06-21

## 2023-06-21 RX ORDER — ONDANSETRON 2 MG/ML
4 INJECTION INTRAMUSCULAR; INTRAVENOUS EVERY 6 HOURS PRN
Status: DISCONTINUED | OUTPATIENT
Start: 2023-06-21 | End: 2023-06-23 | Stop reason: HOSPADM

## 2023-06-21 RX ORDER — OXYCODONE HYDROCHLORIDE 5 MG/1
5 TABLET ORAL EVERY 4 HOURS PRN
Status: DISCONTINUED | OUTPATIENT
Start: 2023-06-21 | End: 2023-06-23 | Stop reason: HOSPADM

## 2023-06-21 RX ORDER — GABAPENTIN 100 MG/1
100 CAPSULE ORAL
Status: COMPLETED | OUTPATIENT
Start: 2023-06-21 | End: 2023-06-21

## 2023-06-21 RX ORDER — DEXTROSE MONOHYDRATE 25 G/50ML
25-50 INJECTION, SOLUTION INTRAVENOUS
Status: DISCONTINUED | OUTPATIENT
Start: 2023-06-21 | End: 2023-06-23 | Stop reason: HOSPADM

## 2023-06-21 RX ORDER — ONDANSETRON 2 MG/ML
4 INJECTION INTRAMUSCULAR; INTRAVENOUS EVERY 30 MIN PRN
Status: DISCONTINUED | OUTPATIENT
Start: 2023-06-21 | End: 2023-06-21 | Stop reason: HOSPADM

## 2023-06-21 RX ORDER — TOLTERODINE TARTRATE 2 MG/1
2 TABLET, EXTENDED RELEASE ORAL 2 TIMES DAILY
Status: DISCONTINUED | OUTPATIENT
Start: 2023-06-21 | End: 2023-06-23 | Stop reason: HOSPADM

## 2023-06-21 RX ORDER — CARBOXYMETHYLCELLULOSE SODIUM 5 MG/ML
1 SOLUTION/ DROPS OPHTHALMIC 4 TIMES DAILY PRN
Status: DISCONTINUED | OUTPATIENT
Start: 2023-06-21 | End: 2023-06-23 | Stop reason: HOSPADM

## 2023-06-21 RX ORDER — FUROSEMIDE 40 MG
40 TABLET ORAL DAILY
Status: DISCONTINUED | OUTPATIENT
Start: 2023-06-21 | End: 2023-06-23 | Stop reason: HOSPADM

## 2023-06-21 RX ORDER — FAMOTIDINE 20 MG/1
20 TABLET, FILM COATED ORAL 2 TIMES DAILY
Status: DISCONTINUED | OUTPATIENT
Start: 2023-06-21 | End: 2023-06-23 | Stop reason: HOSPADM

## 2023-06-21 RX ORDER — NICOTINE POLACRILEX 4 MG
15-30 LOZENGE BUCCAL
Status: DISCONTINUED | OUTPATIENT
Start: 2023-06-21 | End: 2023-06-23 | Stop reason: HOSPADM

## 2023-06-21 RX ORDER — HYDROMORPHONE HCL IN WATER/PF 6 MG/30 ML
.2-.4 PATIENT CONTROLLED ANALGESIA SYRINGE INTRAVENOUS
Status: DISCONTINUED | OUTPATIENT
Start: 2023-06-21 | End: 2023-06-23 | Stop reason: HOSPADM

## 2023-06-21 RX ORDER — FENTANYL CITRATE 50 UG/ML
50 INJECTION, SOLUTION INTRAMUSCULAR; INTRAVENOUS EVERY 5 MIN PRN
Status: DISCONTINUED | OUTPATIENT
Start: 2023-06-21 | End: 2023-06-21 | Stop reason: HOSPADM

## 2023-06-21 RX ADMIN — HYDRALAZINE HYDROCHLORIDE 5 MG: 20 INJECTION INTRAMUSCULAR; INTRAVENOUS at 13:44

## 2023-06-21 RX ADMIN — METOPROLOL TARTRATE 10 MG: 5 INJECTION INTRAVENOUS at 20:05

## 2023-06-21 RX ADMIN — GABAPENTIN 100 MG: 100 CAPSULE ORAL at 07:03

## 2023-06-21 RX ADMIN — SODIUM CHLORIDE, SODIUM LACTATE, POTASSIUM CHLORIDE, CALCIUM CHLORIDE: 600; 310; 30; 20 INJECTION, SOLUTION INTRAVENOUS at 08:15

## 2023-06-21 RX ADMIN — SODIUM CHLORIDE: 9 INJECTION, SOLUTION INTRAVENOUS at 19:02

## 2023-06-21 RX ADMIN — PROPOFOL 100 MCG/KG/MIN: 10 INJECTION, EMULSION INTRAVENOUS at 12:05

## 2023-06-21 RX ADMIN — METOPROLOL TARTRATE 100 MG: 50 TABLET, FILM COATED ORAL at 07:03

## 2023-06-21 RX ADMIN — REMIFENTANIL HYDROCHLORIDE 0.08 MCG/KG/MIN: 1 INJECTION, POWDER, LYOPHILIZED, FOR SOLUTION INTRAVENOUS at 08:20

## 2023-06-21 RX ADMIN — PROPOFOL 100 MCG/KG/MIN: 10 INJECTION, EMULSION INTRAVENOUS at 08:18

## 2023-06-21 RX ADMIN — SUCCINYLCHOLINE CHLORIDE 120 MG: 20 INJECTION, SOLUTION INTRAMUSCULAR; INTRAVENOUS; PARENTERAL at 08:16

## 2023-06-21 RX ADMIN — ACETAMINOPHEN 975 MG: 325 TABLET, FILM COATED ORAL at 06:16

## 2023-06-21 RX ADMIN — FAMOTIDINE 20 MG: 10 INJECTION, SOLUTION INTRAVENOUS at 20:14

## 2023-06-21 RX ADMIN — SODIUM CHLORIDE, POTASSIUM CHLORIDE, SODIUM LACTATE AND CALCIUM CHLORIDE: 600; 310; 30; 20 INJECTION, SOLUTION INTRAVENOUS at 07:55

## 2023-06-21 RX ADMIN — HYDROMORPHONE HYDROCHLORIDE 0.2 MG: 0.2 INJECTION, SOLUTION INTRAMUSCULAR; INTRAVENOUS; SUBCUTANEOUS at 19:02

## 2023-06-21 RX ADMIN — PHENYLEPHRINE HYDROCHLORIDE 0.5 MCG/KG/MIN: 10 INJECTION INTRAVENOUS at 08:25

## 2023-06-21 RX ADMIN — FENTANYL CITRATE 50 MCG: 50 INJECTION, SOLUTION INTRAMUSCULAR; INTRAVENOUS at 12:25

## 2023-06-21 RX ADMIN — DEXAMETHASONE SODIUM PHOSPHATE 8 MG: 4 INJECTION, SOLUTION INTRA-ARTICULAR; INTRALESIONAL; INTRAMUSCULAR; INTRAVENOUS; SOFT TISSUE at 09:59

## 2023-06-21 RX ADMIN — HYDRALAZINE HYDROCHLORIDE 5 MG: 20 INJECTION INTRAMUSCULAR; INTRAVENOUS at 13:57

## 2023-06-21 RX ADMIN — REMIFENTANIL HYDROCHLORIDE 0.08 MCG/KG/MIN: 1 INJECTION, POWDER, LYOPHILIZED, FOR SOLUTION INTRAVENOUS at 11:46

## 2023-06-21 RX ADMIN — LIDOCAINE HYDROCHLORIDE 100 MG: 20 INJECTION, SOLUTION INFILTRATION; PERINEURAL at 08:16

## 2023-06-21 RX ADMIN — ONDANSETRON 4 MG: 2 INJECTION INTRAMUSCULAR; INTRAVENOUS at 12:26

## 2023-06-21 RX ADMIN — CLINDAMYCIN PHOSPHATE 900 MG: 900 INJECTION, SOLUTION INTRAVENOUS at 08:00

## 2023-06-21 RX ADMIN — CEFAZOLIN SODIUM 2 G: 2 INJECTION, SOLUTION INTRAVENOUS at 22:12

## 2023-06-21 RX ADMIN — CEFAZOLIN SODIUM 2 G: 2 INJECTION, SOLUTION INTRAVENOUS at 14:31

## 2023-06-21 RX ADMIN — SODIUM CHLORIDE, POTASSIUM CHLORIDE, SODIUM LACTATE AND CALCIUM CHLORIDE: 600; 310; 30; 20 INJECTION, SOLUTION INTRAVENOUS at 12:30

## 2023-06-21 RX ADMIN — PROPOFOL 140 MG: 10 INJECTION, EMULSION INTRAVENOUS at 08:16

## 2023-06-21 RX ADMIN — PROPOFOL 100 MCG/KG/MIN: 10 INJECTION, EMULSION INTRAVENOUS at 10:46

## 2023-06-21 RX ADMIN — HYDRALAZINE HYDROCHLORIDE 5 MG: 20 INJECTION INTRAMUSCULAR; INTRAVENOUS at 14:08

## 2023-06-21 RX ADMIN — PHENYLEPHRINE HYDROCHLORIDE 200 MCG: 10 INJECTION INTRAVENOUS at 08:27

## 2023-06-21 RX ADMIN — FENTANYL CITRATE 100 MCG: 50 INJECTION, SOLUTION INTRAMUSCULAR; INTRAVENOUS at 08:16

## 2023-06-21 ASSESSMENT — ACTIVITIES OF DAILY LIVING (ADL)
DRESS: 1-->ASSISTANCE (EQUIPMENT/PERSON) NEEDED
ADLS_ACUITY_SCORE: 35
EQUIPMENT_CURRENTLY_USED_AT_HOME: CANE, STRAIGHT
SWALLOWING: 2-->DIFFICULTY SWALLOWING LIQUIDS/FOODS
BATHING: 1-->ASSISTANCE NEEDED
VISION_MANAGEMENT: GLASSES
DRESSING/BATHING: BATHING DIFFICULTY, ASSISTANCE 1 PERSON;DRESSING DIFFICULTY, ASSISTANCE 1 PERSON
FALL_HISTORY_WITHIN_LAST_SIX_MONTHS: NO
WALKING_OR_CLIMBING_STAIRS_DIFFICULTY: YES
ADLS_ACUITY_SCORE: 40
DRESSING/BATHING_DIFFICULTY: YES
TRANSFERRING: 1-->ASSISTANCE (EQUIPMENT/PERSON) NEEDED
TOILETING: 0-->NOT TOILET TRAINED OR ASSISTANCE NEEDED (DEVELOPMENTALLY APPROPRIATE)
ADLS_ACUITY_SCORE: 38
TOILETING_ISSUES: NO
WEAR_GLASSES_OR_BLIND: YES
EATING: 0-->ASSISTANCE NEEDED (DEVELOPMENTALLY APPROPRIATE)
CHANGE_IN_FUNCTIONAL_STATUS_SINCE_ONSET_OF_CURRENT_ILLNESS/INJURY: NO
DOING_ERRANDS_INDEPENDENTLY_DIFFICULTY: NO
ADLS_ACUITY_SCORE: 35
DRESS: 0-->ASSISTANCE NEEDED (DEVELOPMENTALLY APPROPRIATE)
EATING: 0-->INDEPENDENT
COMMUNICATION_MANAGEMENT: TIME
WALKING_OR_CLIMBING_STAIRS: STAIR CLIMBING DIFFICULTY, REQUIRES EQUIPMENT
TRANSFERRING: 0-->ASSISTANCE NEEDED (DEVELOPMENTALLY APPROPRIATE)
ADLS_ACUITY_SCORE: 35
ADLS_ACUITY_SCORE: 35
CONCENTRATING,_REMEMBERING_OR_MAKING_DECISIONS_DIFFICULTY: NO
DIFFICULTY_EATING/SWALLOWING: YES
ADLS_ACUITY_SCORE: 38
ADLS_ACUITY_SCORE: 35
EATING/SWALLOWING: SWALLOWING LIQUIDS;SWALLOWING SOLID FOOD;EATING
SWALLOWING: 2-->DIFFICULTY SWALLOWING LIQUIDS/FOODS
TOILETING: 1-->ASSISTANCE (EQUIPMENT/PERSON) NEEDED
DIFFICULTY_COMMUNICATING: YES
ADLS_ACUITY_SCORE: 35

## 2023-06-21 ASSESSMENT — LIFESTYLE VARIABLES: TOBACCO_USE: 1

## 2023-06-21 NOTE — ANESTHESIA PROCEDURE NOTES
Airway       Patient location during procedure: OR       Procedure Start/Stop Times: 6/21/2023 8:18 AM  Staff -        CRNA: Arianna Fiore APRN CRNA       Performed By: CRNA  Consent for Airway        Urgency: elective  Indications and Patient Condition       Indications for airway management: josephine-procedural       Induction type:intravenous       Mask difficulty assessment: 1 - vent by mask    Final Airway Details       Final airway type: endotracheal airway       Successful airway: ETT - single, Oral and NIM  Endotracheal Airway Details        ETT size (mm): 7.0       Cuffed: yes       Successful intubation technique: video laryngoscopy       VL Blade Size: MAC 4       Grade View of Cords: 1       Adjucts: stylet       Position: Right       Measured from: lips       Bite block used: Soft (bilateral soft bite blocks placed)    Post intubation assessment        Placement verified by: capnometry, equal breath sounds and chest rise        Number of attempts at approach: 1       Secured with: pink tape       Ease of procedure: easy       Dentition: Unchanged and Intact    Medication(s) Administered   Medication Administration Time: 6/21/2023 8:18 AM

## 2023-06-21 NOTE — BRIEF OP NOTE
[facility]    Brief Operative Note    Pre-operative diagnosis: Oropharyngeal dysphagia [R13.12]  Post-operative diagnosis Same as pre-operative diagnosis    Procedure: Procedure(s):  Cervical 3/4 osteophytectomy with anterior discectomy and fusion  Surgeon: Surgeon(s) and Role:     * Michelle Genao MD - Primary     * Esther Quintanilla MD - Assisting     * Juanis Maza MD - Resident - Assisting  Anesthesia: General   Estimated Blood Loss: 20 ml    Drains: None  Specimens: * No specimens in log *  Findings:   Completed osteophytectomy, confirmed level with XR.  Complications: None.  Implants:   Implant Name Type Inv. Item Serial No.  Lot No. LRB No. Used Action   Endoskeleton TCS Interbody system Medium   [id number]-[id number] [employer] [id number] N/A 1 Implanted   GRAFT BONE PUTTY SHMUEL DBM 1ML [id number] - [id number] Bone/Tissue/Biologic GRAFT BONE PUTTY SHMUEL DBM 1ML [id number] [employer] N/A N/A 1 Implanted   Endoskeleton TCS Interbody system Plate   [id number] [employer] [id number] N/A 1 Implanted   SCREW BN 14MM 3.5MM TI STD ENDOSKELETON TCS SPNE CRV INTRBD - [id number] Metallic Hardware/[place] SCREW BN 14MM 3.5MM TI STD ENDOSKELETON TCS SPNE CRV INTRBD  [employer] NA N/A 2 Implanted     Juanis Lorenzo MD  Neurosurgery PGY3    Please contact neurosurgery resident on call with questions.    Dial * * [phone], enter [phone] when prompted.

## 2023-06-21 NOTE — ANESTHESIA POSTPROCEDURE EVALUATION
Patient: Bird Kaur    Procedure: Procedure(s):  Cervical 3/4 osteophytectomy with anterior discectomy and fusion       Anesthesia Type:  No value filed.    Note:  Disposition: Admission   Postop Pain Control: Uneventful            Sign Out: Well controlled pain   PONV: No   Neuro/Psych: Uneventful            Sign Out: Acceptable/Baseline neuro status   Airway/Respiratory: Uneventful            Sign Out: Acceptable/Baseline resp. status   CV/Hemodynamics: Uneventful            Sign Out: Acceptable CV status; No obvious hypovolemia; No obvious fluid overload   Other NRE: NONE   DID A NON-ROUTINE EVENT OCCUR? No           Last vitals:  Vitals Value Taken Time   /78 06/21/23 1445   Temp 37  C (98.6  F) 06/21/23 1300   Pulse 75 06/21/23 1454   Resp     SpO2 96 % 06/21/23 1454   Vitals shown include unvalidated device data.    Electronically Signed By: Thom Soria MD  June 21, 2023  2:55 PM

## 2023-06-21 NOTE — PHARMACY-ADMISSION MEDICATION HISTORY
Pharmacist Admission Medication History    Admission medication history is complete. The information provided in this note is only as accurate as the sources available at the time of the update.    Medication reconciliation/reorder completed by provider prior to medication history? Yes    Information Source(s): Patient, Clinic records (Pre-op med history completed by PharmD 6/1/23) and CareEverywhere/SureScripts via phone    Pertinent Information: Patient reports taking none of his medications this morning prior to his procedure. Last dose of Eliquis was 6/18 per pre-op nurse (and per patient instructions at pre-op eval).     Changes made to PTA medication list:    Added: None    Deleted: None    Changed: None    Medication Affordability: N/A     Allergies reviewed with patient and updates made in EHR: unable to assess    Medication History Completed By: Sophia Cortes RPH 6/21/2023 6:55 PM    Prior to Admission medications    Medication Sig Last Dose Taking? Auth Provider Long Term End Date   apixaban ANTICOAGULANT (ELIQUIS) 5 MG tablet Take 5 mg by mouth 2 times daily 6/18/2023 Yes Reported, Patient     carboxymethylcellulose PF (REFRESH PLUS) 0.5 % ophthalmic solution Place 1 drop into both eyes 4 times daily as needed for dry eyes Past Week Yes Reported, Patient     dofetilide (TIKOSYN) 500 MCG capsule Take 500 mcg by mouth 2 times daily 6/20/2023 Yes Reported, Patient Yes    empagliflozin (JARDIANCE) 25 MG TABS tablet Take 12.5 mg by mouth daily 6/20/2023 at 2100 Yes Reported, Patient     finasteride (PROSCAR) 5 MG tablet Take 5 mg by mouth daily 6/20/2023 at 2100 Yes Reported, Patient     fluticasone (FLONASE) 50 MCG/ACT nasal spray Spray 1 spray into both nostrils daily as needed for allergies More than a month Yes Reported, Patient     furosemide (LASIX) 40 MG tablet Take 40 mg by mouth daily 6/20/2023 at 0900 Yes Reported, Patient Yes    lovastatin (MEVACOR) 20 MG tablet Take 20 mg by mouth At Bedtime  6/20/2023 at 0900 Yes Reported, Patient Yes    metoprolol tartrate (LOPRESSOR) 100 MG tablet Take 100 mg by mouth 2 times daily 6/20/2023 at 2100 Yes Reported, Patient Yes    montelukast (SINGULAIR) 10 MG tablet Take 10 mg by mouth At Bedtime Past Week Yes Reported, Patient Yes    potassium chloride ER (K-TAB) 20 MEQ CR tablet 40mEq every morning and 20Meq every evening Past Week Yes Reported, Patient     sacubitril-valsartan (ENTRESTO)  MG per tablet Take 1 tablet by mouth 2 times daily 6/20/2023 Yes Reported, Patient Yes    trospium (SANCTURA) 20 MG tablet Take 20 mg by mouth 2 times daily (before meals) 6/20/2023 at 2100 Yes Reported, Patient     vitamin B-12 (CYANOCOBALAMIN) 100 MCG tablet Take 100 mcg by mouth daily Past Week Yes Unknown, Entered By History     Vitamin D3 (CHOLECALCIFEROL) 25 mcg (1000 units) tablet Take 25 mcg by mouth daily Past Week Yes Unknown, Entered By History

## 2023-06-21 NOTE — OR NURSING
Notified MDA Dr. Soria that patient did not take morning dose of metoprolol. See MAR for medications given.

## 2023-06-21 NOTE — OP NOTE
Procedure Date: 06/21/2023    PREOPERATIVE DIAGNOSIS:  Oropharyngeal dysphagia.    POSTOPERATIVE DIAGNOSIS:  Oropharyngeal dysphagia.    PROCEDURE PERFORMED:    1. Cervical 3-4 osteophytectomy   2. Anterior Cervical 3-4 diskectomy and fusion  3. Use of intraoperative microscope  4. Use of intraoperative fluoroscopy  5. Use of neuromonitoring    SURGEON:    Panel 1.  Esther Quintanilla MD and Selene Tom MD    Panel 2.   Michelle Genao MD and Juanis Lorenzo MD    ANESTHESIA:  General.    ESTIMATED BLOOD LOSS:  20 mL.    DRAINS:  None.    SPECIMENS:  None.    FINDINGS:  Completed osteophytectomy, confirmed level with x-ray.    COMPLICATIONS:  None.    IMPLANTS:    1.  Endoskeleton TCS interbody system, medium, serial #[id number], [employer]onic, lot number [id number], 6 mm, 16 mm x 14 mm.  2.  Plate type, Titan TCS plate, serial #[id number], lot number [id number].  3.  Screws, VN 14 mm, 3.55 x 14 mm, endoskeleton TCS spine CRV interbody, lot #[id number], [employer]onic x2.    4.  1 mL Jaja Putty.    INDICATIONS FOR PROCEDURE:  The patient is a 72-year-old male with a 2 year history of progressive dysphagia that has been gradually worsening to both solids and liquids for which he underwent extensive workup with ENT and speech and swallow clinic, at which point he was identified to have a C3-C4 large osteophyte compressing the esophagus causing epiglottis inversion.  He was referred to the neurosurgery clinic for consultation for osteophytectomy.  Due to the complexity of the compression of the esophagus, we requested that our ENT colleagues perform the approach for safety while dissecting the esophagus off the spine.  The risks and benefits were discussed with the patient, and he provided consent for the above-mentioned procedure.    DESCRIPTION OF PROCEDURE:    The patient was brought to the operating room by our anesthesia colleagues.  He underwent general anesthesia and endotracheal intubation by our ENT colleagues.   Please see separate dictation for the beginning of the procedure, opening and exposure of the anterior cervical spine by our ENT colleagues.      We were called by our ENT colleagues once the anterior spine and osteophyte were exposed.  At this point, with the Bovie cautery, we proceeded to open the longus colli muscle at the midline and exposes the C3-4 osteophyte.  Then, with Leksell rongeurs and Lempert rongeurs, we proceeded to remove the most superficial aspect of the osteophyte.  We proceeded to identify the edges of the osteophyte laterally along the spine and proceeded to also remove the osteophyte with Kerrison rongeurs and Leksell rongeurs.  Once we had removed the majority of the osteophyte, we proceeded to bring in the microscope and flattened out the surface of the anterior vertebral bodies of C3 and C4 with the Midas drill, effectively removing all of the osteophyte.  At this point, we used a 15 blade to open the disk and perform a diskectomy with pituitary rongeurs and Kerrison rongeurs. Th superior and inferior endplates were drilled. Once the entirety of the disk was removed and we were satisfied with the created space after the diskectomy, we proceeded to measure our interbody sizer and identified that a 6 mm cage would be appropriate.  Next, the TCS Titan interbody implant was opened and filled with [place] Putty.  The implant was then inserted in the disk space and gently tapped into place with a tamp.  At this point, the superior and inferior bone screws were placed by first creating a  hole, followed by placement of the screws.  Care was taken to ensure that the greatest angle from the entry both superior and inferior was obtained based on the extent of the skin opening.  The final plate covering the screws was then tightened over the Titan Endoskeleton cage.  AP and lateral x-rays were obtained to confirm adequate placement of the cage, screws and our expected level at C3-C4.    With  this, we confirmed hemostasis and again copiously irrigated the wound.  We proceeded with our closure.  The platysma was approximated with simple interrupted 3-0 Vicryl sutures.  The subcutaneous tissue was loosely approximated with simple interrupted 3-0 Vicryl sutures.  The dermal layer was approximated with inverted interrupted 3-0 Vicryl sutures.  Skin was approximated with a running 4-0 subcuticular Monocryl suture.  Dermabond skin glue was applied after the incision was cleaned with ChloraPrep.    At the end of the procedure, all counts for cautery tips, needles and sponges were correct x2.    Dr. Genao was present for the entirety of the procedure.    Michelle Genao MD    As Dictated by ALEXIA GUERRERO MD        D: 2023   T: 2023   MT: [id number]    Name:     ADAN RODRIGEZ  MRN:      [id number]        Account:        [id number]   :      1950           Procedure Date: 2023     Document: [id number]  I was present for the critical portions of the operation and immediately available for the remainder.  AMP

## 2023-06-21 NOTE — ANESTHESIA PROCEDURE NOTES
Arterial Line Procedure Note    Pre-Procedure   Staff -        Anesthesiologist:  Jean-Claude Hogan MD       Performed By: anesthesiologist       Location: OR       Pre-Anesthestic Checklist: patient identified, IV checked, risks and benefits discussed, informed consent, monitors and equipment checked, pre-op evaluation and at physician/surgeon's request  Timeout:       Correct Patient: Yes        Correct Procedure: Yes        Correct Site: Yes        Correct Position: Yes   Line Placement:   This line was placed Post Induction  Procedure   Procedure: arterial line, new line and elective       Laterality: left       Insertion Site: radial.  Sterile Prep        Skin prep: Chloraprep  Insertion/Injection        Technique: Seldinger Technique        Catheter Type/Size: 20 G, 1.75 in/4.5 cm quick cath (integral wire)  Narrative        Tegaderm dressing used.       Complications: None apparent,        Arterial waveform: Yes

## 2023-06-21 NOTE — ANESTHESIA CARE TRANSFER NOTE
Patient: Bird Kaur    Procedure: Procedure(s):  Cervical 3/4 osteophytectomy with anterior discectomy and fusion       Diagnosis: Oropharyngeal dysphagia [R13.12]  Diagnosis Additional Information: No value filed.    Anesthesia Type:   No value filed.     Note:    Oropharynx: oropharynx clear of all foreign objects and spontaneously breathing  Level of Consciousness: awake  Oxygen Supplementation: face mask  Level of Supplemental Oxygen (L/min / FiO2): 6  Independent Airway: airway patency satisfactory and stable  Dentition: dentition unchanged  Vital Signs Stable: post-procedure vital signs reviewed and stable  Report to RN Given: handoff report given  Patient transferred to: PACU    Handoff Report: Identifed the Patient, Identified the Reponsible Provider, Reviewed the pertinent medical history, Discussed the surgical course, Reviewed Intra-OP anesthesia mangement and issues during anesthesia, Set expectations for post-procedure period and Allowed opportunity for questions and acknowledgement of understanding      Vitals:  Vitals Value Taken Time   /90 06/21/23 1250   Temp     Pulse 70 06/21/23 1256   Resp     SpO2 96 % 06/21/23 1256   Vitals shown include unvalidated device data.    Electronically Signed By: MAYDA Lovelace CRNA  June 21, 2023  12:57 PM

## 2023-06-21 NOTE — OP NOTE
Date of Procedure: 6/21/2023    Attending Physician: Esther Quintanilla MD    Resident Physicians: Selene Tom MD    Procedure Performed:  Approach to anterior spine    Preoperative Diagnosis:  1. Cervical osteophytes  2. Dysphagia  3. Obesity, BMI > 35    Postoperative Diagnosis: same    Anesthesia: General    Blood loss: Minimal    Specimens: None    Implants:  Per Dr Genao    Complications: None    Findings:  Large cervical osteophyte  Superior laryngeal nerve and artery identified and preserved    Indications:  Bird Kaur is a 72 year old man with a long standing history of dysphagia. He had a swallow study which showed prominent osteophyte at C3-4. He has elected to undergo removal of the osteophyte and spinal fusion. Dr Genao requested assistance with the approach.    Description of Procedure:  After informed consent was obtained, the patient was brought back to the main operating room and placed in a supine position. General anesthesia was induced and the patient was orotracheally intubated with a NIMS tube. Appropriate monitoring and lines were placed. The planned incision was marked about 2 fingerbreadths below the mandible, at the level of the hyoid. This was injected with 1% lidocaine with 1:100,000 epinephrine. The patient was prepped and draped in sterile fashion. A time out was performed. A 15 blade was used to make an incision through the neck. This was extended down through the platysma. Subplatysmal flaps were raised superiorly and inferiorly. The anterior border of the SCM was identified and released along its medial border. Given the patient's body habitus some fat was removed along the medial neck. The omohyoid was identified at the hyoid. Blunt dissection was performed between the medial border of the SCM and the omohyoid. The superior laryngeal artery and nerve were identified and preserved. Blunt dissection was performed with tonsil hemostat, kitner and bipolar medially. The spine  was palpated and the osteophyte was identified. Blunt dissection was continued medially along the spine, releasing the fascia over the osteophyte. The pharynx/larynx were carefully retracted to avoid injury. The entire osteophyte was exposed along with the vertebra above and below. Careful hemostasis was achieved. At this point the neurosurgery team took over for the osteophyte removal and ACDF. Patient tolerated the exposure well with no immediate complications.    Esther Quintanilla MD    Department of Otolaryngology

## 2023-06-21 NOTE — ANESTHESIA PREPROCEDURE EVALUATION
Anesthesia Pre-Procedure Evaluation    Patient: Bird Kaur   MRN: [id number] : 1950        Procedure : Procedure(s):  Cervical 3/4 osteophytectomy with anterior discectomy and fusion          Past Medical History:   Diagnosis Date     Benign essential hypertension      Chronic atrial fibrillation (H)      Dysphagia      Heart failure with preserved ejection fraction, NYHA class I (H)      Obesity      CHICA (obstructive sleep apnea)       Past Surgical History:   Procedure Laterality Date     APPENDECTOMY      in his 20s     cervcial spine      surgery on c-spine, not a fusion     HERNIA REPAIR       LAPAROSCOPIC UNROOFING LIVER CYST  2016     MOHS MICROGRAPHIC PROCEDURE       TONSILLECTOMY & ADENOIDECTOMY      childhood     TOTAL KNEE ARTHROPLASTY Left       Allergies   Allergen Reactions     Amoxicillin      Flomax [Tamsulosin]      Lisinopril Unknown     Molds & Smuts Unknown     Morphine      Verapamil       Social History     Tobacco Use     Smoking status: Former     Years: 5.00     Types: Cigarettes     Quit date:      Years since quittin.4     Passive exposure: Past     Smokeless tobacco: Never     Tobacco comments:     Quit smoking in  . Smoked 5 years in the . Usually one cigarette per day    Substance Use Topics     Alcohol use: Not Currently      Wt Readings from Last 1 Encounters:   23 123.1 kg (271 lb 6.2 oz)        Anesthesia Evaluation   Pt has had prior anesthetic.     History of anesthetic complications  - PONV.      ROS/MED HX  ENT/Pulmonary:     (+) sleep apnea, tobacco use, Past use, asthma     Neurologic: Comment: C3/C4 osteophyte      Cardiovascular: Comment: afib s/p ablation x2, on dofetilide, last in NSR    AAA    TTE 2023:  LVEF 60-65%  RV mild enlargement, normal function  Asc Ao dilation 4cm    (+) hypertension-----    METS/Exercise Tolerance: 3 - Able to walk 1-2 blocks without stopping    Hematologic:       Musculoskeletal:        GI/Hepatic: Comment: dysphagia    (+) GERD,     Renal/Genitourinary:       Endo:       Psychiatric/Substance Use:       Infectious Disease:       Malignancy:       Other:            Physical Exam    Airway        Mallampati: III   TM distance: > 3 FB   Neck ROM: limited   Mouth opening: > 3 cm    Respiratory Devices and Support         Dental         B=Bridge, C=Chipped, L=Loose, M=Missing    Cardiovascular          Rhythm and rate: regular and normal     Pulmonary           breath sounds clear to auscultation           OUTSIDE LABS:  CBC:   Lab Results   Component Value Date    WBC 5.0 06/21/2023    WBC 4.7 04/24/2023    HGB 17.5 06/21/2023    HGB 18.2 (H) 04/24/2023    HCT 52.0 06/21/2023    HCT 54.9 (H) 04/24/2023     06/21/2023     04/24/2023     BMP:   Lab Results   Component Value Date     06/21/2023     04/17/2023    POTASSIUM 3.6 06/21/2023    POTASSIUM 4.5 04/17/2023    CHLORIDE 109 (H) 06/21/2023    CHLORIDE 110 (H) 04/17/2023    CO2 24 06/21/2023    CO2 26 04/17/2023    BUN 17.3 06/21/2023    BUN 17.7 04/17/2023    CR 1.20 (H) 06/21/2023    CR 1.27 (H) 04/17/2023    GLC 99 06/21/2023     (H) 04/17/2023     COAGS: No results found for: PTT, INR, FIBR  POC: No results found for: BGM, HCG, HCGS  HEPATIC: No results found for: ALBUMIN, PROTTOTAL, ALT, AST, GGT, ALKPHOS, BILITOTAL, BILIDIRECT, LARISA  OTHER:   Lab Results   Component Value Date    KEVIN 8.8 06/21/2023       Anesthesia Plan    ASA Status:  3   NPO Status:  NPO Appropriate    Anesthesia Type: General.     - Airway: ETT         Techniques and Equipment:     - Lines/Monitors: 2nd IV, Arterial Line     Consents    Anesthesia Plan(s) and associated risks, benefits, and realistic alternatives discussed. Questions answered and patient/representative(s) expressed understanding.    - Discussed:     - Discussed with:  Patient      - Extended Intubation/Ventilatory Support Discussed: No.      - Patient is DNR/DNI Status: No     Use of blood products discussed: Yes.     - Discussed with: Patient.     - Consented: consented to blood products            Reason for refusal: other.     Postoperative Care            Comments:                Thom Soria MD

## 2023-06-22 ENCOUNTER — APPOINTMENT (OUTPATIENT)
Dept: PHYSICAL THERAPY | Facility: CLINIC | Age: 73
DRG: 471 | End: 2023-06-22
Attending: STUDENT IN AN ORGANIZED HEALTH CARE EDUCATION/TRAINING PROGRAM
Payer: COMMERCIAL

## 2023-06-22 ENCOUNTER — APPOINTMENT (OUTPATIENT)
Dept: SPEECH THERAPY | Facility: CLINIC | Age: 73
DRG: 471 | End: 2023-06-22
Attending: STUDENT IN AN ORGANIZED HEALTH CARE EDUCATION/TRAINING PROGRAM
Payer: COMMERCIAL

## 2023-06-22 ENCOUNTER — APPOINTMENT (OUTPATIENT)
Dept: GENERAL RADIOLOGY | Facility: CLINIC | Age: 73
DRG: 471 | End: 2023-06-22
Attending: STUDENT IN AN ORGANIZED HEALTH CARE EDUCATION/TRAINING PROGRAM
Payer: COMMERCIAL

## 2023-06-22 ENCOUNTER — APPOINTMENT (OUTPATIENT)
Dept: OCCUPATIONAL THERAPY | Facility: CLINIC | Age: 73
DRG: 471 | End: 2023-06-22
Attending: STUDENT IN AN ORGANIZED HEALTH CARE EDUCATION/TRAINING PROGRAM
Payer: COMMERCIAL

## 2023-06-22 LAB
ANION GAP SERPL CALCULATED.3IONS-SCNC: 11 MMOL/L (ref 7–15)
BUN SERPL-MCNC: 17.8 MG/DL (ref 8–23)
CALCIUM SERPL-MCNC: 8.7 MG/DL (ref 8.8–10.2)
CHLORIDE SERPL-SCNC: 110 MMOL/L (ref 98–107)
CREAT SERPL-MCNC: 1.32 MG/DL (ref 0.67–1.17)
DEPRECATED HCO3 PLAS-SCNC: 24 MMOL/L (ref 22–29)
GFR SERPL CREATININE-BSD FRML MDRD: 58 ML/MIN/1.73M2
GLUCOSE BLDC GLUCOMTR-MCNC: 114 MG/DL (ref 70–99)
GLUCOSE BLDC GLUCOMTR-MCNC: 120 MG/DL (ref 70–99)
GLUCOSE BLDC GLUCOMTR-MCNC: 130 MG/DL (ref 70–99)
GLUCOSE BLDC GLUCOMTR-MCNC: 136 MG/DL (ref 70–99)
GLUCOSE BLDC GLUCOMTR-MCNC: 86 MG/DL (ref 70–99)
GLUCOSE BLDC GLUCOMTR-MCNC: 97 MG/DL (ref 70–99)
GLUCOSE SERPL-MCNC: 90 MG/DL (ref 70–99)
HGB BLD-MCNC: 17 G/DL (ref 13.3–17.7)
MAGNESIUM SERPL-MCNC: 2.1 MG/DL (ref 1.7–2.3)
PHOSPHATE SERPL-MCNC: 3.7 MG/DL (ref 2.5–4.5)
POTASSIUM SERPL-SCNC: 3.6 MMOL/L (ref 3.4–5.3)
SARS-COV-2 RNA RESP QL NAA+PROBE: NEGATIVE
SODIUM SERPL-SCNC: 145 MMOL/L (ref 136–145)

## 2023-06-22 PROCEDURE — 258N000003 HC RX IP 258 OP 636: Performed by: STUDENT IN AN ORGANIZED HEALTH CARE EDUCATION/TRAINING PROGRAM

## 2023-06-22 PROCEDURE — 97161 PT EVAL LOW COMPLEX 20 MIN: CPT | Mod: GP

## 2023-06-22 PROCEDURE — 72040 X-RAY EXAM NECK SPINE 2-3 VW: CPT | Mod: 26 | Performed by: STUDENT IN AN ORGANIZED HEALTH CARE EDUCATION/TRAINING PROGRAM

## 2023-06-22 PROCEDURE — 120N000002 HC R&B MED SURG/OB UMMC

## 2023-06-22 PROCEDURE — 250N000013 HC RX MED GY IP 250 OP 250 PS 637: Performed by: NEUROLOGICAL SURGERY

## 2023-06-22 PROCEDURE — 83735 ASSAY OF MAGNESIUM: CPT | Performed by: STUDENT IN AN ORGANIZED HEALTH CARE EDUCATION/TRAINING PROGRAM

## 2023-06-22 PROCEDURE — 92526 ORAL FUNCTION THERAPY: CPT | Mod: GN

## 2023-06-22 PROCEDURE — 250N000011 HC RX IP 250 OP 636: Mod: JZ | Performed by: STUDENT IN AN ORGANIZED HEALTH CARE EDUCATION/TRAINING PROGRAM

## 2023-06-22 PROCEDURE — 99024 POSTOP FOLLOW-UP VISIT: CPT | Performed by: NURSE PRACTITIONER

## 2023-06-22 PROCEDURE — 250N000011 HC RX IP 250 OP 636: Mod: JZ

## 2023-06-22 PROCEDURE — 85018 HEMOGLOBIN: CPT | Performed by: STUDENT IN AN ORGANIZED HEALTH CARE EDUCATION/TRAINING PROGRAM

## 2023-06-22 PROCEDURE — 92610 EVALUATE SWALLOWING FUNCTION: CPT | Mod: GN

## 2023-06-22 PROCEDURE — 84100 ASSAY OF PHOSPHORUS: CPT | Performed by: STUDENT IN AN ORGANIZED HEALTH CARE EDUCATION/TRAINING PROGRAM

## 2023-06-22 PROCEDURE — 97535 SELF CARE MNGMENT TRAINING: CPT | Mod: GO

## 2023-06-22 PROCEDURE — 250N000011 HC RX IP 250 OP 636: Mod: JZ | Performed by: NURSE PRACTITIONER

## 2023-06-22 PROCEDURE — 97165 OT EVAL LOW COMPLEX 30 MIN: CPT | Mod: GO

## 2023-06-22 PROCEDURE — 97530 THERAPEUTIC ACTIVITIES: CPT | Mod: GP

## 2023-06-22 PROCEDURE — 250N000013 HC RX MED GY IP 250 OP 250 PS 637: Performed by: STUDENT IN AN ORGANIZED HEALTH CARE EDUCATION/TRAINING PROGRAM

## 2023-06-22 PROCEDURE — 97530 THERAPEUTIC ACTIVITIES: CPT | Mod: GO

## 2023-06-22 PROCEDURE — 250N000009 HC RX 250

## 2023-06-22 PROCEDURE — 82310 ASSAY OF CALCIUM: CPT | Performed by: STUDENT IN AN ORGANIZED HEALTH CARE EDUCATION/TRAINING PROGRAM

## 2023-06-22 PROCEDURE — 999N000065 XR CERVICAL SPINE 2/3 VIEWS

## 2023-06-22 PROCEDURE — 36415 COLL VENOUS BLD VENIPUNCTURE: CPT | Performed by: STUDENT IN AN ORGANIZED HEALTH CARE EDUCATION/TRAINING PROGRAM

## 2023-06-22 RX ORDER — DEXAMETHASONE SODIUM PHOSPHATE 4 MG/ML
6 INJECTION, SOLUTION INTRA-ARTICULAR; INTRALESIONAL; INTRAMUSCULAR; INTRAVENOUS; SOFT TISSUE DAILY
Status: COMPLETED | OUTPATIENT
Start: 2023-06-22 | End: 2023-06-23

## 2023-06-22 RX ORDER — POTASSIUM CHLORIDE 750 MG/1
20 TABLET, EXTENDED RELEASE ORAL ONCE
Status: COMPLETED | OUTPATIENT
Start: 2023-06-22 | End: 2023-06-22

## 2023-06-22 RX ADMIN — ACETAMINOPHEN 975 MG: 325 TABLET, FILM COATED ORAL at 20:30

## 2023-06-22 RX ADMIN — DOFETILIDE 500 MCG: 0.5 CAPSULE ORAL at 13:56

## 2023-06-22 RX ADMIN — HYDROMORPHONE HYDROCHLORIDE 0.2 MG: 0.2 INJECTION, SOLUTION INTRAMUSCULAR; INTRAVENOUS; SUBCUTANEOUS at 08:38

## 2023-06-22 RX ADMIN — DEXAMETHASONE SODIUM PHOSPHATE 6 MG: 4 INJECTION, SOLUTION INTRA-ARTICULAR; INTRALESIONAL; INTRAMUSCULAR; INTRAVENOUS; SOFT TISSUE at 15:53

## 2023-06-22 RX ADMIN — POTASSIUM CHLORIDE 20 MEQ: 750 TABLET, EXTENDED RELEASE ORAL at 14:07

## 2023-06-22 RX ADMIN — FINASTERIDE 5 MG: 5 TABLET, FILM COATED ORAL at 13:56

## 2023-06-22 RX ADMIN — TOLTERODINE TARTRATE 2 MG: 2 TABLET, FILM COATED ORAL at 20:44

## 2023-06-22 RX ADMIN — FAMOTIDINE 20 MG: 10 INJECTION, SOLUTION INTRAVENOUS at 08:19

## 2023-06-22 RX ADMIN — METOPROLOL TARTRATE 100 MG: 50 TABLET, FILM COATED ORAL at 14:01

## 2023-06-22 RX ADMIN — SACUBITRIL AND VALSARTAN 1 TABLET: 97; 103 TABLET, FILM COATED ORAL at 13:56

## 2023-06-22 RX ADMIN — FINASTERIDE 5 MG: 5 TABLET, FILM COATED ORAL at 20:44

## 2023-06-22 RX ADMIN — SACUBITRIL AND VALSARTAN 1 TABLET: 97; 103 TABLET, FILM COATED ORAL at 20:44

## 2023-06-22 RX ADMIN — SODIUM CHLORIDE: 9 INJECTION, SOLUTION INTRAVENOUS at 07:17

## 2023-06-22 RX ADMIN — DOFETILIDE 500 MCG: 0.5 CAPSULE ORAL at 20:45

## 2023-06-22 RX ADMIN — METOPROLOL TARTRATE 10 MG: 5 INJECTION INTRAVENOUS at 08:18

## 2023-06-22 RX ADMIN — METOPROLOL TARTRATE 10 MG: 5 INJECTION INTRAVENOUS at 02:33

## 2023-06-22 RX ADMIN — TOLTERODINE TARTRATE 2 MG: 2 TABLET, FILM COATED ORAL at 13:56

## 2023-06-22 RX ADMIN — MONTELUKAST 10 MG: 10 TABLET, FILM COATED ORAL at 22:24

## 2023-06-22 RX ADMIN — POTASSIUM CHLORIDE 20 MEQ: 750 CAPSULE, EXTENDED RELEASE ORAL at 20:32

## 2023-06-22 RX ADMIN — ACETAMINOPHEN 975 MG: 325 TABLET, FILM COATED ORAL at 13:58

## 2023-06-22 RX ADMIN — METOPROLOL TARTRATE 100 MG: 50 TABLET, FILM COATED ORAL at 20:31

## 2023-06-22 RX ADMIN — SENNOSIDES AND DOCUSATE SODIUM 1 TABLET: 50; 8.6 TABLET ORAL at 20:31

## 2023-06-22 RX ADMIN — FAMOTIDINE 20 MG: 20 TABLET, FILM COATED ORAL at 20:31

## 2023-06-22 ASSESSMENT — ACTIVITIES OF DAILY LIVING (ADL)
ADLS_ACUITY_SCORE: 44
ADLS_ACUITY_SCORE: 44
ADLS_ACUITY_SCORE: 45
ADLS_ACUITY_SCORE: 45
ADLS_ACUITY_SCORE: 44
ADLS_ACUITY_SCORE: 44
ADLS_ACUITY_SCORE: 38
ADLS_ACUITY_SCORE: 44
ADLS_ACUITY_SCORE: 38
ADLS_ACUITY_SCORE: 45
ADLS_ACUITY_SCORE: 45
ADLS_ACUITY_SCORE: 44

## 2023-06-22 NOTE — PROGRESS NOTES
"   06/22/23 1100   Appointment Info   Signing Clinician's Name / Credentials (OT) Steph Lazo OTRL   Living Environment   People in Home spouse   Current Living Arrangements house   Home Accessibility stairs to enter home;stairs within home   Number of Stairs, Main Entrance 3   Number of Stairs, Within Home, Primary other (see comments)  (flight upstairs/basement)   Transportation Anticipated family or friend will provide   Living Environment Comments Pt lives in a house w/his wife, all needs met on main floor. Pt reports tub shower, access to a shower chair and walker if need (from knee replacement surgery). pt wife Aranza URIARTE at discharge.   Self-Care   Usual Activity Tolerance moderate   Current Activity Tolerance fair   Regular Exercise No   Equipment Currently Used at Home cane, straight   Fall history within last six months no   Activity/Exercise/Self-Care Comment Pt reports IND-mod I at baseline w/ADLs. Pt reports history of falls but none recently. Pt wife reports concern over pt sedentary lifestyle, would like him to \"be more active doing what he enjoys\".   General Information   Onset of Illness/Injury or Date of Surgery 06/21/23   Referring Physician Juanis Maza MD   Patient/Family Therapy Goal Statement (OT) To go home, prevent falls, return to preferred activities   Additional Occupational Profile Info/Pertinent History of Current Problem Per chart Bird Kaur is a 72 year old male  s/p cervical 3-4 osteophytectomy and anterior Cervical 3-4 diskectomy and fusion with Dr. Michelle Genao on 6/21/2023   Existing Precautions/Restrictions fall;spinal  (cervical precautions)   Cognitive Status Examination   Orientation Status orientation to person, place and time   Visual Perception   Visual Impairment/Limitations corrective lenses full-time   Sensory   Sensory Quick Adds sensation intact   Posture   Posture forward head position   Range of Motion Comprehensive   Comment, General Range of Motion " "BUE WFL, pt and wife report limited neck ROM since previous \"back surgery\"   Strength Comprehensive (MMT)   General Manual Muscle Testing (MMT) Assessment no strength deficits identified   Coordination   Upper Extremity Coordination No deficits were identified   Bed Mobility   Bed Mobility supine-sit   Supine-Sit Avery (Bed Mobility) minimum assist (75% patient effort);2 person assist   Transfers   Transfers sit-stand transfer;toilet transfer;shower transfer   Sit-Stand Transfer   Sit-Stand Avery (Transfers) minimum assist (75% patient effort)   Sit/Stand Transfer Comments min A, progressing to CGA   Shower Transfer   Type (Shower Transfer) lateral   Avery Level (Shower Transfer) minimum assist (75% patient effort)   Shower Transfer Comments per clinical judgement   Toilet Transfer   Type (Toilet Transfer) sit-stand   Avery Level (Toilet Transfer) minimum assist (75% patient effort)   Toilet Transfer Comments per clinical judgement   Balance   Balance Comments Good seated balance, standing balance with CGA   Activities of Daily Living   BADL Assessment/Intervention bathing;lower body dressing;toileting   Bathing Assessment/Intervention   Avery Level (Bathing) minimum assist (75% patient effort)   Comment, (Bathing) per clinical judgement   Assistive Devices (Bathing) shower chair;long-handled sponge   Lower Body Dressing Assessment/Training   Comment, (Lower Body Dressing) per clinical judgement   Avery Level (Lower Body Dressing) moderate assist (50% patient effort)   Toileting   Comment, (Toileting) per clinical judgement   Avery Level (Toileting) minimum assist (75% patient effort)   Clinical Impression   Criteria for Skilled Therapeutic Interventions Met (OT) Yes, treatment indicated   OT Diagnosis Pt is below baseline for ADLs   OT Problem List-Impairments impacting ADL problems related to;strength;pain;post-surgical precautions;activity tolerance impaired "   Assessment of Occupational Performance 3-5 Performance Deficits   Identified Performance Deficits dressing, bathing, toileting   Planned Therapy Interventions (OT) ADL retraining;IADL retraining;bed mobility training;transfer training;home program guidelines;progressive activity/exercise   Clinical Decision Making Complexity (OT) low complexity   Anticipated Equipment Needs Upon Discharge (OT) bathing equipment   Risk & Benefits of therapy have been explained evaluation/treatment results reviewed;care plan/treatment goals reviewed;risks/benefits reviewed;current/potential barriers reviewed;participants voiced agreement with care plan;participants included;patient;spouse/significant other   Clinical Impression Comments Pt presents below baseline, limited by activity tolerance, and post-surgical precautions. pt will benefit from skilled OT to progress toward PLOF   OT Total Evaluation Time   OT Eval, Low Complexity Minutes (87394) 7   OT Goals   Therapy Frequency (OT) 6 times/wk   OT Predicted Duration/Target Date for Goal Attainment 06/30/23   OT Goals Lower Body Dressing;Lower Body Bathing;Transfers;Toilet Transfer/Toileting   OT: Lower Body Dressing Modified independent;within precautions   OT: Lower Body Bathing Modified independent;using adaptive equipment;with precautions   OT: Transfer Independent  (tub transfer)   OT: Toilet Transfer/Toileting Independent;toilet transfer;cleaning and garment management;within precautions   Interventions   Interventions Quick Adds Therapeutic Activity;Self-Care/Home Management   Self-Care/Home Management   Self-Care/Home Mgmt/ADL, Compensatory, Meal Prep Minutes (72970) 9   Symptoms Noted During/After Treatment (Meal Preparation/Planning Training) fatigue   Treatment Detail/Skilled Intervention Pt educated on cervical spine precautions and implications for ADLs. Th educating pt and wife on AE likely required at d/c. Pt and wife agreeable to rec for long handled sponge, noting  access to shower chair from previous surgery.   Therapeutic Activities   Therapeutic Activity Minutes (90711) 27   Symptoms noted during/after treatment fatigue   Treatment Detail/Skilled Intervention Facilitated functional transfers and ambulation to progress pt activity tolerance for ADLs. See eval for bed mobility and initial STS. Pt progressing to stepping in place > 3 x 7-8 steps forward and backward in room with CGA and support on IV pole. Pt transferring to chair, motivated to trial hallway ambulation. Th gathering walker and WC for safety. pt STS from recliner with CGA-min A and FWW. pt ambulating 140 ft w/CGA and FWW. Pt moving at slow, steady pace, no LOB noted. Pt requiring min reminders during session to minimize heavy pushing through UEs, likely to requiring cont reminders for transfers from low surfaces.   OT Discharge Planning   OT Plan tub transfer, LB dressing, toilet transfers   OT Discharge Recommendation (DC Rec) home with assist;home with outpatient occupational therapy   OT Rationale for DC Rec pt mobilizing well, anticipate will be safe to d/c home with spouse A for heavy ADLs/IADls. pt may benefit from OP OT/PT to cont progressing IND and endurance for ADLs, return to preferred activities   OT Brief overview of current status CGA w/FWW, min A x 2 bed mobility   Total Session Time   Timed Code Treatment Minutes 36   Total Session Time (sum of timed and untimed services) 43

## 2023-06-22 NOTE — PLAN OF CARE
Arrived from: PACU @ 3545   Belongings/meds: glasses, clothing, shoes  2 RN Skin Assessment Completed by: Sharon VARMA and Farhat HAIR    Non-intact findings documented (yes/no/NA): old scar L knee and posterior cervical spine, L abrasion R shin, anterior R neck incision CDI  SONIDO, L underarm rash, slight groin rash    Status: pt POD 0 s/p osteophytectomy w/ anterior discectomy and fusion to treat oropharyngeal dysphagia; hx controlled afib  Vitals: HTN w/in parameters <160, OVSS on 2L NC, on CCM  Neuros: AOx4, generalized weakness, slow garbled speech, nt to R pinkie and bottoms of feet at baseline  IV: PIV SL x1, L PIV infusign NS @ 85 ml/hr  Labs/Electrolytes: BG checks WNL  Resp/trach: LSC, oral secretion s suctioned by pt w/ sid  Diet: NPO d/t swallowing difficulties, no PO meds  Bowel status: BS+, LBM 6/20 PTA per pt  : hilario in place, can remove POD 1  Skin: see above  Pain: anterior neck pain managed w/ IV dilaudid  Activity: A 1-2 GB  Social: wife at bedside  Plan: PT/OT/nutritin consulted, SLP to eval swallow tomorrow and determine PO plan; meds switched to IV or held until SLP eval  Updates this shift: IV metoprolol given by float float for pt maintenance dose to manage chronic afib

## 2023-06-22 NOTE — PROGRESS NOTES
"   06/22/23 1614   Appointment Info   Signing Clinician's Name / Credentials (PT) Gaviota Jara PT, DPT   Rehab Comments (PT) spinal (cervical) prec   Living Environment   People in Home spouse   Current Living Arrangements house   Home Accessibility stairs to enter home;stairs within home   Number of Stairs, Main Entrance 3   Stair Railings, Main Entrance none   Number of Stairs, Within Home, Primary other (see comments)  (full flight to upper level and basement)   Transportation Anticipated family or friend will provide   Living Environment Comments Pt lives with spouse, able to provide 24/7 care. All needs met on main level.   Self-Care   Usual Activity Tolerance moderate   Current Activity Tolerance fair   Regular Exercise No   Equipment Currently Used at Home cane, straight;walker, standard   Fall history within last six months no   Activity/Exercise/Self-Care Comment Pt reports mod I with all mobility at baseline, owns walker but doesn't typically use, keeps cane in car to use in community as needed 2/2 chronic back pain. pt enjoys hunting but spouse reports relatively sedentary lifestyle   General Information   Onset of Illness/Injury or Date of Surgery 06/21/23   Referring Physician Juanis Maza MD   Patient/Family Therapy Goals Statement (PT) return home   Pertinent History of Current Problem (include personal factors and/or comorbidities that impact the POC) per EMR \"Bird Kaur is a 72 year old male  s/p cervical 3-4 osteophytectomy and anterior Cervical 3-4 diskectomy and fusion with Dr. Michelle Genao on 6/21/2023. \"   Existing Precautions/Restrictions fall;spinal   General Observations activity: up with assist   Cognition   Affect/Mental Status (Cognition) WFL   Pain Assessment   Patient Currently in Pain Yes, see Vital Sign flowsheet   Integumentary/Edema   Integumentary/Edema Comments scrotal edema, anterior cervical incision, old posterior cervical incision   Posture    Posture " Comments rigid posture, minimal cervical ROM   Range of Motion (ROM)   ROM Comment cervical ROM impaired, unable to look up for >5 deg rotation, B LE WFL   Strength (Manual Muscle Testing)   Strength Comments generalized weakness   Bed Mobility   Comment, (Bed Mobility) min A x1 for trunk with HOB elevated 30 deg   Transfers   Comment, (Transfers) CGA STS from EOB   Gait/Stairs (Locomotion)   Comment, (Gait/Stairs) CGA with 2WW, slow pace, steady on feet, limited by pain   Balance   Balance Comments impaired standing balance, limited righting reactions.   Sensory Examination   Sensory Perception patient reports no sensory changes   Sensory Perception Comments baseline numbness B feet   Coordination   Coordination no deficits were identified   Coordination Comments generally slowed movements B UE   Clinical Impression   Criteria for Skilled Therapeutic Intervention Yes, treatment indicated   PT Diagnosis (PT) impaired functional mobility   Influenced by the following impairments impaired strength, balance, act tolerance, pain, post op prec   Functional limitations due to impairments bed mobility, transfers, gait, stairs   Clinical Presentation (PT Evaluation Complexity) Evolving/Changing   Clinical Presentation Rationale post op   Clinical Decision Making (Complexity) low complexity   Planned Therapy Interventions (PT) balance training;bed mobility training;gait training;home exercise program;neuromuscular re-education;patient/family education;ROM (range of motion);stair training;strengthening;transfer training;progressive activity/exercise;risk factor education;home program guidelines   Risk & Benefits of therapy have been explained evaluation/treatment results reviewed;care plan/treatment goals reviewed;risks/benefits reviewed;current/potential barriers reviewed;participants voiced agreement with care plan;participants included;patient;spouse/significant other   PT Total Evaluation Time   PT Eval, Low Complexity  Minutes (13992) 9   Physical Therapy Goals   PT Frequency 6x/week   PT Predicted Duration/Target Date for Goal Attainment 07/14/23   PT Goals Bed Mobility;Stairs;Transfers;Gait   PT: Bed Mobility Independent;Within precautions;Supine to/from sit   PT: Transfers Modified independent;Sit to/from stand;Bed to/from chair;Assistive device;Within precautions   PT: Gait Modified independent;Standard walker;Within precautions;Greater than 200 feet   PT: Stairs Supervision/stand-by assist;3 stairs;Within precautions   Interventions   Interventions Quick Adds Therapeutic Activity   PT Discharge Planning   PT Plan progress gait with LRAD, stairs   PT Discharge Recommendation (DC Rec) home with assist;home with home care physical therapy   PT Rationale for DC Rec Pt below baseline, mobilizing well immediately post op, anticiapte with ongoing IP therapies, pt will progress to safe dc home with 24/7 A from spouse, Rec HHPT for strength, endurance,a nd balnce as leaving home likely taxing initally. Will need to complete stairs prior to dc   PT Brief overview of current status Ax1 with WW and GB, encourage amb 3xdaily   Total Session Time   Timed Code Treatment Minutes 24   Total Session Time (sum of timed and untimed services) 33

## 2023-06-22 NOTE — PLAN OF CARE
Status: POD 0 osteophytectomy w/ anterior discectomy and fusion to treat oropharyngeal dysphagia. Hx of controlled afib.  Vitals: HTN within  parameters, on 2L O2 NC. On CCM.  Neuros: A&Ox4, generalized weakness, slow garbled speech. N/T to R pinkie and bottoms of feet at baseline.  IV: PIV SL, PIV infusing NS @ 85 mL/hr  Labs/Electrolytes: BG Q 4 hrs.   Resp/trach: Patient independently suctioning oral secretions   Diet: NPO d/t difficulty swallowing. No PO meds.   Bowel status: LBM 6/20  : Goldberg in place. To be removed today   Skin: Old scar on L knee and posterior cervical spine, abrasion to R shin, anterior R Neck incision, L under arm rash, mild groin rash.   Pain: Anterior neck pain managed with PRN IV dilaudid.   Activity: Ax1-2 GB  Plan: PT/OT/Nutrition consulted. SLP to evaluate swallow today and determine PO plan. Medications switched to IV or held until SLP eval.

## 2023-06-22 NOTE — PLAN OF CARE
Status: admitted for dysphagia and underwent Cervical 3/4 osteophytectomy with anterior discectomy and fusion on 6/21  Vitals: VSS within parameters. Known afib.  Neuros: slurred, garbled speech improving as shift progressed. Baseline numbness in R pinky finger and in soles bilaterally.  IV: PIV SL'd  Labs/Electrolytes: replaced K+  Resp/trach: weaned to RA while awake, pt does use CiPap overnight at home but just wants O2 via nasal cannula during HS  Diet: tolerating full liquid diet with pills whole in pudding  Bowel status: No BM this shift,   : removed hilario this afternoon at 1300, DTV approx 1700  Skin: Old scar on L knee and posterior cervical spine, abrasion to R shin, anterior R Neck incision, L under arm rash, mild groin rash  Pain: c/o mild HA and neck pain managed effectively with PO tylenol.  Activity: up with 1, GB, walker  Social: wife at bedside entire shift, supportive and updated  Plan: continue to monitor PO intake and voiding  Updates this shift: pt pleasant and cooperative.

## 2023-06-22 NOTE — PROGRESS NOTES
"[facility], [place]   Neurosurgery Progress Note:    Date of service: 6/22/2023    Assessment: Bird Kaur is a 72 year old male  s/p cervical 3-4 osteophytectomy and anterior Cervical 3-4 diskectomy and fusion with Dr. Michelle Genao on 6/21/2023.     Clinically Significant Risk Factors Present on Admission               # Drug Induced Coagulation Defect: home medication list includes an anticoagulant medication    # Obesity: Estimated body mass index is 38.53 kg/m  as calculated from the following:    Height as of this encounter: 1.803 m (5' 11\").    Weight as of this encounter: 125.3 kg (276 lb 3.8 oz).        Plan:  - Neuro checks/vital signs: Q4 hours  - SBP: <160  - Pain control: IV dilaudid until cleared for orals   - Activity: up with assist   - Restrictions/Bracing: no collar necessary  - Diet: NPO pending speech therapy assessment   - Drain: none  - DVT prophylaxis: SCDs  - Imaging:  Upright x-rays pending  - PT/OT: pending  - Ok for steroids if speech therapy feels necessary        Winifred Corea, MAYDA, CNP  6/22/2023  Department of Neurosurgery  Pager: [phone]    I have spent a total of 25 minutes total time counseling, coordination, and chart review, over 50% of the time was spent in direct patient care.       Interval History:  Continues to note right should pain.  Goldberg to be removed today.  Awaiting Speech therapy assessment, has been NPO since surgery.            Objective:   Temp:  [97.4  F (36.3  C)-98.7  F (37.1  C)] 97.8  F (36.6  C)  Pulse:  [67-83] 67  Resp:  [14-22] 16  BP: (114-189)/() 144/91  MAP:  [97 mmHg] 97 mmHg  Arterial Line BP: (146-195)/() 149/73  SpO2:  [92 %-96 %] 96 %  I/O last 3 completed shifts:  In: 2152.5 [I.V.:2152.5]  Out: 1225 [Urine:1205; Blood:20]    Gen: Appears comfortable, NAD  Wound: Incision, clean, dry, intact without strikethrough  Neurologic:  - Alert & Oriented to person, place, time, and situation  - " Follows commands briskly  - Speech fluent, spontaneous. No aphasia or dysarthria.  - No gaze preference. No apparent hemineglect.  - PERRL, EOMI  - Strong eye closure, jaw clench, and cheek puff  - Face symmetric with sensation intact to light touch  - Palate elevates symmetrically, uvula midline, tongue protrudes midline  - Trapezii and sternocleidomastoid muscles 5/5 bilaterally  - No pronator drift     Del Tr Bi WE WF Gr   R 5 5 5 5 5 5   L 5 5 5 5 5 5    HF KE KF DF PF EHL   R 5 5 5 5 5 5   L 5 5 5 5 5 5     Reflexes 2+ throughout    Sensation intact and symmetric to light touch throughout    LABS  Recent Labs   Lab Test 06/22/23  0736 06/21/23  0621 04/24/23  1043 04/17/23  1421   WBC  --  5.0 4.7 5.7   HGB 17.0 17.5 18.2* 18.7*   MCV  --  89 89 88   PLT  --  155 166 182       Recent Labs   Lab Test 06/22/23  0756 06/22/23  0406 06/22/23  0021 06/21/23 2007 06/21/23  1916 06/21/23  1843 06/21/23  0621 04/17/23  1421   NA  --   --   --   --   --   --  143 143   POTASSIUM  --   --   --   --  3.6  --  3.6 4.5   CHLORIDE  --   --   --   --   --   --  109* 110*   CO2  --   --   --   --   --   --  24 26   BUN  --   --   --   --   --   --  17.3 17.7   CR  --   --   --   --   --   --  1.20* 1.27*   ANIONGAP  --   --   --   --   --   --  10 7   KEVIN  --   --   --   --   --   --  8.8 9.4   GLC 86 97 120*   < >  --    < > 99 100*    < > = values in this interval not displayed.       IMAGING    Recent Results (from the past 24 hour(s))   XR Surgery SAPPHIRE Fluoro Less Than 5 Min w Stills    Narrative    This exam was marked as non-reportable because it will not be read by a   radiologist or a [facility] non-radiologist provider.

## 2023-06-22 NOTE — PROGRESS NOTES
"Addendum 6/23: Diet advanced, orders placed for Magic Cup per patient preferences.     CLINICAL NUTRITION SERVICES - ASSESSMENT NOTE     Nutrition Prescription    RECOMMENDATIONS FOR MDs/PROVIDERS TO ORDER:   None at this time. Should diet not advance within 3 days, recommend initiating nutrition support.     Malnutrition Status:   Severe malnutrition in the context of acute illness.     Recommendations already ordered by Registered Dietitian (RD):   Ensure Enlive - Vanilla - Breakfast   Ensure Clear - Apple - Lunch     Future/Additional Recommendations:   Monitor diet advancement, weight and intake trends.   - For future preferences, patient reports liking ice cream and is willing to try Magic Cup in vanilla, once diet advances.       REASON FOR ASSESSMENT   Bird Kaur is a/an 72 year old male assessed by the dietitian for Provider Order - \"Patient is Post op Complex Spine.\" + \"Patient needs high-protein education and ordering of preferred supplements at 1.5 g protein per kg body weight.\"   - See nutrition section below, will not recommend 1.5 g pro/kg due to creatinine and GFR.     PMHx   Per H&P: Hypertension, Atrial Fibrillation, CHF, Asthma, Obstructive sleep apnea, Dysphadia ~2 years that has since worsened leading to need for intervention/admission.     NUTRITION HISTORY   Visited with patient in room. Pt reports having good appetite, but has been on full liquid diet today. Bird reports cream of wheat was difficult, but he was able to tolerate pudding and most of the other thin liquids he tried. Bird states he is agreeable to starting supplements to help with increasing intake, but likely will still not be able to meet needs via oral intake due to having to drink slowly. Spoke with SLP and they reported that the current plan is for pt to be reassessed tomorrow, time permitting. Will monitor for diet advancement as able. Pt stated willing to try Magic cup once diet advances, vanilla. " "    Assessed by SLP today (6/22), recommendation states cautious initiation of full liquids with thin consistency     Skin: Terry score 16. Nutrition marked as probably inadequate.     CURRENT NUTRITION ORDERS   Diet: Full Liquid  Intake/Tolerance: No intakes documented since admit (6/21/23 1311).     LABS   Labs Reviewed   06/22/23 00:21 06/22/23 04:06 06/22/23 07:36 06/22/23 07:56 06/22/23 11:58   Sodium   145     Potassium   3.6     Creatinine   1.32 (H)     GFR Estimate   58 (L)     Calcium   8.7 (L)     Magnesium   2.1     Phosphorus   3.7     Glucose   90     Glucose by meter POCT 120 (H) 97  86 114 (H)   Hemoglobin   17.0       MEDICATIONS   Medications reviewed  - Pepcid  - Lasix  - Insulin Aspart  - Miralax  - Potassium Chloride  - Senokot  - NS @ 85 mL/hr   - Dilaudid PRN  - Milk of Magnesia PRN    ANTHROPOMETRICS  Height: 180.3 cm (5' 11\")  Most Recent Weight: 125.3 kg (276 lb 3.8 oz)    IBW: 78.2 kg  BMI: 38.53 - Obesity Grade II BMI 35-39.9  Weight History:   Pt with 14.7 lb (5.1 %) weight loss over 5 months.    Wt Readings from Last Encounters:   06/21/23 125.3 kg (276 lb 3.8 oz)   06/01/23 123.5 kg (272 lb 4.8 oz)   04/17/23 126.1 kg (278 lb)   04/03/23 125.6 kg (277 lb)   03/03/23 125.6 kg (277 lb)   01/03/23 132 kg (291 lb)     Dosing Weight: 90 kg - Adjusted     ASSESSED NUTRITION NEEDS   Estimated Energy Needs: 5600-5175 kcals/day (30 - 35 kcals/kg )  Justification: Increased needs vs Maintenance  Estimated Protein Needs:  grams protein/day (0.8 - 1.2 grams of pro/kg)  Justification: Kidney Function + Increased needs.   Estimated Fluid Needs: 5027-1888 mL/day (25 - 30 mL/kg)   Justification: Maintenance    PHYSICAL FINDINGS   See malnutrition section below.    MALNUTRITION   % Intake: Decreased intake does not meet criteria  % Weight Loss: > 5% in 1 month (severe)  Subcutaneous Fat Loss: Facial region: Moderate  Muscle Loss: Unable to assess  Fluid Accumulation/Edema: None " noted  Malnutrition Diagnosis: Severe malnutrition in the context of acute illness.     NUTRITION DIAGNOSIS   Inadequate oral intake related to dysphagia as evidenced by need for full liquid diet with cautious initiation and need for utilizing supraglottic swallow strategy.     INTERVENTIONS   Implementation   Ensure Enlive - Vanilla - Breakfast   Ensure Clear - Apple - Lunch     For future preferences, patient reports liking ice cream and is willing to try Magic Cup in vanilla, once diet advances.      Goals   Diet adv v nutrition support within 2-3 days.  Patient to consume % of nutritionally adequate meal trays TID, or the equivalent with supplements/snacks.     Monitoring/Evaluation   Progress toward goals will be monitored and evaluated per protocol.  Yudy Nguyen RD, LD   6A/7D pager [phone]  Weekend pager [phone]

## 2023-06-22 NOTE — PROGRESS NOTES
"Otolaryngology Progress Note  June 22, 2023    Subjective/Intvl events: No acute events overnight. Was seen by nutrition and recommended ensure supplementation. SLP evaluated swallow today and recommended thin and full liquid diet. Pt reports his dysphagia has improved since yesterday. SLP evaluation showed overt aspiration with immediate coughing. However, as trials progressed, pt had decreased aspirations and increased PO tolerance with supraglottic swallow strategy. Yesterday was coughing, choking, and regurgitation while eating. Today was able to have hot chocolate, yogurt, and cereal. Sitting up at the chair while eating helps. Pt usually uses CPAP overnight at home but forgot it at home and been using nasal canula here in the hospital. Pt reports lots of mucus in the back of the throat and using suction to clear it. Pt has been sleeping in his chair.     Pt reports prior to surgery had changes to his voice and speech due to the osteophyte. People had difficulty understanding him.     O: BP (!) 151/90 (BP Location: Right arm)   Pulse 70   Temp 98.3  F (36.8  C) (Oral)   Resp 18   Ht 1.803 m (5' 11\")   Wt 125.3 kg (276 lb 3.8 oz)   SpO2 96%   BMI 38.53 kg/m    Physical Exam:   Gen: Awake and does not appear in any acute distress.   Head: normocephalic, atraumatic  Nose: no anterior drainage, epistaxis, or obvious abnormality  Mouth: moist, no ulcers, tongue midline and symmetric  Oropharynx: tonsils within normal limits, uvula midline, no oropharyngeal erythema  Neck: Right neck incision clean, dry, and intact, some swelling and ecchymosis of right lateral neck,   Throat: Hoarse voice          Intake/Output Summary (Last 24 hours) at 6/22/2023 1648  Last data filed at 6/22/2023 1415  Gross per 24 hour   Intake 1390 ml   Output 1075 ml   Net 315 ml       LABS:  Recent Labs   Lab 06/22/23  1551 06/22/23  1158 06/22/23  0756 06/22/23  0736 06/21/23 2007 06/21/23  1916 06/21/23  1843 06/21/23  0621   NA  --  "  --   --  145  --   --   --  143   POTASSIUM  --   --   --  3.6  --  3.6  --  3.6   CHLORIDE  --   --   --  110*  --   --   --  109*   KEVIN  --   --   --  8.7*  --   --   --  8.8   CO2  --   --   --  24  --   --   --  24   BUN  --   --   --  17.8  --   --   --  17.3   CR  --   --   --  1.32*  --   --   --  1.20*   * 114* 86 90   < >  --    < > 99    < > = values in this interval not displayed.     CBC  Recent Labs   Lab 06/22/23  0736 06/21/23  0621   WBC  --  5.0   RBC  --  5.82   HGB 17.0 17.5   HCT  --  52.0   MCV  --  89   MCH  --  30.1   MCHC  --  33.7   RDW  --  13.3   PLT  --  155       A/P: Bird Kaur is a 72 year old male s/p cervical 3/4 osteophytectomy with anterior approach with Dr. Quintanilla on 6/21/23. Pt had dysphagia pre-op due to osteophyte burden but his dysphagia has gradually worsened post-op which was expected.  Pt dysphagia has improved now POD#2. If he is not able to tolerate PO enough to get full nutritional requirement, he may require an NG tube, however he was able to tolerate FLD today and we anticipate improvement in his dysphagia over the next few days    - advance diet as tolerated per speech evaluations, planned VSS in a few days  - Recommend humidifying all oxygen to thin secretions  - Remainder of cares per primary team  - please page ENT with questions or concerns.     -- Patient and above plan discussed with Dr. Dwight Hathaway, MS4  [employer]    Resident/Fellow Attestation   I, Selene Tom MD, was present with the medical/TODD student who participated in the service and in the documentation of the note.  I have verified the history and personally performed the physical exam and medical decision making.  I agree with the assessment and plan of care as documented in the note.        Selene Tom MD  Otolaryngology PGY3

## 2023-06-22 NOTE — PROGRESS NOTES
06/22/23 0918   Appointment Info   Signing Clinician's Name / Credentials (SLP) Dee Petty MS CCC-SLP   General Information   Onset of Illness/Injury or Date of Surgery 06/21/23   Referring Physician Juanis Maza MD   Patient/Family Therapy Goal Statement (SLP) pt would like to drink water   Pertinent History of Current Problem Bird Kaur is a 72 year old male  s/p cervical 3-4 osteophytectomy and anterior Cervical 3-4 diskectomy and fusion with Dr. Michelle Genao on 6/21/2023. Clinical swallow eval completed per MD orders to further assess oropharyngeal swallow function.   Type of Evaluation   Type of Evaluation Swallow Evaluation   Oral Motor   Oral Musculature generally intact   Structural Abnormalities none present   Mucosal Quality adequate   Dentition (Oral Motor)   Dentition (Oral Motor) adequate dentition   Facial Symmetry (Oral Motor)   Facial Symmetry (Oral Motor) WNL   Lip Function (Oral Motor)   Lip Range of Motion (Oral Motor) WNL   Tongue Function (Oral Motor)   Tongue ROM (Oral Motor) WNL   Jaw Function (Oral Motor)   Jaw Function (Oral Motor) WNL   Cough/Swallow/Gag Reflex (Oral Motor)   Volitional Throat Clear/Cough (Oral Motor) WNL   Comment, Cough/Swallow/Gag Reflex (Oral Motor) Congested baseline cough   Vocal Quality/Secretion Management (Oral Motor)   Vocal Quality (Oral Motor) WFL   Secretion Management (Oral Motor)   (pt orally suctioning secretions with baseline wet cough)   General Swallowing Observations   Past History of Dysphagia Pt familiar to SLP caseload with known pre-op dysphagia in the setting of osteophyte burden. Pt with OP VFSS completed 1/13/23 with penetration of thin, pureed, and regular solid textures. Pt with some mild phrayngeal residuals and stasis of barium tablet. Recommendations were for soft and bite sized diet (6) and thin liquids (0). Since then, pt has reported progressive worsening of dysphagia marked by coughing/choking and food sticking. Pt  occasionally regurgitates PO. Pt reported overt  choking with thin liquids yesterday.   Respiratory Support (General Swallowing Observations) none   Current Diet/Method of Nutritional Intake (General Swallowing Observations, NIS) NPO   Swallowing Evaluation Clinical swallow evaluation   Clinical Swallow Evaluation   Feeding Assistance set up only required   Clinical Swallow Evaluation Textures Trialed thin liquids;mildly thick liquids;pureed   Clinical Swallow Eval: Thin Liquid Texture Trial   Mode of Presentation, Thin Liquids cup;self-fed   Volume of Liquid or Food Presented 5 oz   Oral Phase of Swallow WFL   Pharyngeal Phase of Swallow impaired;coughing/choking;throat clearing;wet vocal quality after swallow   Diagnostic Statement Initial trials of thin liquids via cup resulted in overt s/sx of aspiration marked by immediate coughing. As trials progressed, pt with decreased s/sx of aspiration marked by throat clearing. Improved PO tolerance with cues to use supraglottic swallow strategy.   Clinical Swallow Eval: Mildly Thick Liquids   Mode of Presentation cup;self-fed   Volume Presented 5 oz   Oral Phase WFL   Pharyngeal Phase impaired;throat clearing;wet vocal quality after swallow   Diagnostic Statement Mildly thick liquids via cup resulted in overt s/sx of aspiration marked by throat clearing and wet vocal quality.   Clinical Swallow Evaluation: Puree Solid Texture Trial   Mode of Presentation, Puree spoon;self-fed   Volume of Puree Presented 3 oz   Oral Phase, Puree WFL   Pharyngeal Phase, Puree intact;repeated swallows   Diagnostic Statement No overt s/sx of aspiration. Pt required repeated swallows.   Swallowing Recommendations   Diet Consistency Recommendations thin liquids (level 0);full liquid diet   Supervision Level for Intake distant supervision needed   Mode of Delivery Recommendations bolus size, small;slow rate of intake   Swallowing Maneuver Recommendations alternate food and liquid intake;extra  swallow;supraglottic swallow   Monitoring/Assistance Required (Eating/Swallowing) stop eating activities when fatigue is present;monitor for cough or change in vocal quality with intake;optimize oral intake to minimize need for tube feeding   Recommended Feeding/Eating Techniques (Swallow Eval) maintain upright sitting position for eating;maintain upright posture during/after eating for 30 minutes;provide oral hygiene prior to intake;provide 6 smaller meals throughout day   Medication Administration Recommendations, Swallowing (SLP) consider serving PO meds in puree   Instrumental Assessment Recommendations VFSS (videofluoroscopic swallowing study)  (likely recommend post-op VFSS, however given known increased edema until POD 3-4, will hold and assess daily.)   General Therapy Interventions   Planned Therapy Interventions Dysphagia Treatment   Dysphagia treatment Modified diet education;Compensatory strategies for swallowing;Instruction of safe swallow strategies   Clinical Impression   Criteria for Skilled Therapeutic Interventions Met (SLP Eval) Yes, treatment indicated   SLP Diagnosis moderate oropharyngeal dysphagia   Risks & Benefits of therapy have been explained evaluation/treatment results reviewed;care plan/treatment goals reviewed;risks/benefits reviewed;current/potential barriers reviewed;participants voiced agreement with care plan;participants included;patient   Clinical Impression Comments Clinical swallow eval completed per MD orders. Pt presents with moderate oropharyngeal dysphagia s/p cervical 3-4 osteophytectomy and anterior cervical 3-4 diskectomy and fusion, POD1. Pt with baseline wet/congested cough and independently using oral suction at baseline. Oral mech exam otherwise WFL. Initial trials of thin liquids via cup resulted in overt s/sx of aspiration marked by immediate coughing. As trials progressed, pt with decreased s/sx of aspiration marked by throat clearing. Improved PO tolerance with  cues to use supraglottic swallow strategy. Overt s/sx of aspiration were not minimized with mildly thick liquids. No overt s/sx of aspiration with pureed textures, however pt required multiple swallows across trials. Recommend pt cautiously initiate full liquids (thin consistency) with use of supraglottic swallow strategy (swallow, cough, swallow). Consider serving PO meds in puree. Please complete excellent oral cares and encourage frequent ambulation to reduce complications associated with aspiration. Pt will likely require completion of post-op VFSS, however will defer at this time given known risk for increased edema until POD 3-4. ST to continue to follow closely. Anticipate pt would benefit from ST follow-up at discharge.   SLP Discharge Recommendation home with outpatient speech therapy   SLP Rationale for DC Rec pt below baseline oropahryngeal swallowing skills   SLP Brief overview of current status  Recommend pt cautiously initiate full liquids (thin consistency) with use of supraglottic swallow strategy (swallow, cough, swallow). Consider serving PO meds in puree. Please complete excellent oral cares and encourage frequent ambulation to reduce complications associated with aspiration. Pt will likely require completion of post-op VFSS, however will defer at this time given known risk for increased edema until POD 3-4; discussed with MD.    Total Session Time   Total Session Time (sum of timed and untimed services) 32

## 2023-06-23 ENCOUNTER — APPOINTMENT (OUTPATIENT)
Dept: PHYSICAL THERAPY | Facility: CLINIC | Age: 73
DRG: 471 | End: 2023-06-23
Attending: NEUROLOGICAL SURGERY
Payer: COMMERCIAL

## 2023-06-23 ENCOUNTER — APPOINTMENT (OUTPATIENT)
Dept: OCCUPATIONAL THERAPY | Facility: CLINIC | Age: 73
DRG: 471 | End: 2023-06-23
Attending: NEUROLOGICAL SURGERY
Payer: COMMERCIAL

## 2023-06-23 ENCOUNTER — APPOINTMENT (OUTPATIENT)
Dept: SPEECH THERAPY | Facility: CLINIC | Age: 73
DRG: 471 | End: 2023-06-23
Attending: NEUROLOGICAL SURGERY
Payer: COMMERCIAL

## 2023-06-23 VITALS
WEIGHT: 276.24 LBS | SYSTOLIC BLOOD PRESSURE: 128 MMHG | TEMPERATURE: 98 F | DIASTOLIC BLOOD PRESSURE: 78 MMHG | HEIGHT: 71 IN | OXYGEN SATURATION: 96 % | BODY MASS INDEX: 38.67 KG/M2 | HEART RATE: 72 BPM | RESPIRATION RATE: 18 BRPM

## 2023-06-23 LAB
GLUCOSE BLDC GLUCOMTR-MCNC: 109 MG/DL (ref 70–99)
GLUCOSE BLDC GLUCOMTR-MCNC: 110 MG/DL (ref 70–99)
GLUCOSE BLDC GLUCOMTR-MCNC: 118 MG/DL (ref 70–99)
GLUCOSE BLDC GLUCOMTR-MCNC: 125 MG/DL (ref 70–99)
GLUCOSE BLDC GLUCOMTR-MCNC: 136 MG/DL (ref 70–99)
MAGNESIUM SERPL-MCNC: 2 MG/DL (ref 1.7–2.3)
PHOSPHATE SERPL-MCNC: 2.8 MG/DL (ref 2.5–4.5)
POTASSIUM SERPL-SCNC: 4 MMOL/L (ref 3.4–5.3)

## 2023-06-23 PROCEDURE — 36415 COLL VENOUS BLD VENIPUNCTURE: CPT | Performed by: NEUROLOGICAL SURGERY

## 2023-06-23 PROCEDURE — 97535 SELF CARE MNGMENT TRAINING: CPT | Mod: GO

## 2023-06-23 PROCEDURE — 84100 ASSAY OF PHOSPHORUS: CPT | Performed by: NEUROLOGICAL SURGERY

## 2023-06-23 PROCEDURE — 97116 GAIT TRAINING THERAPY: CPT | Mod: GP

## 2023-06-23 PROCEDURE — 97530 THERAPEUTIC ACTIVITIES: CPT | Mod: GO

## 2023-06-23 PROCEDURE — 97530 THERAPEUTIC ACTIVITIES: CPT | Mod: GP

## 2023-06-23 PROCEDURE — 250N000011 HC RX IP 250 OP 636: Mod: JZ | Performed by: NURSE PRACTITIONER

## 2023-06-23 PROCEDURE — 250N000013 HC RX MED GY IP 250 OP 250 PS 637: Performed by: STUDENT IN AN ORGANIZED HEALTH CARE EDUCATION/TRAINING PROGRAM

## 2023-06-23 PROCEDURE — 83735 ASSAY OF MAGNESIUM: CPT | Performed by: NEUROLOGICAL SURGERY

## 2023-06-23 PROCEDURE — 84132 ASSAY OF SERUM POTASSIUM: CPT | Performed by: NEUROLOGICAL SURGERY

## 2023-06-23 PROCEDURE — 99024 POSTOP FOLLOW-UP VISIT: CPT

## 2023-06-23 PROCEDURE — 92526 ORAL FUNCTION THERAPY: CPT | Mod: GN

## 2023-06-23 RX ORDER — POLYETHYLENE GLYCOL 3350 17 G/17G
17 POWDER, FOR SOLUTION ORAL DAILY
Qty: 510 G | Refills: 0 | Status: SHIPPED | OUTPATIENT
Start: 2023-06-23

## 2023-06-23 RX ORDER — OXYCODONE HYDROCHLORIDE 5 MG/1
5 TABLET ORAL EVERY 4 HOURS PRN
Qty: 5 TABLET | Refills: 0 | Status: SHIPPED | OUTPATIENT
Start: 2023-06-23

## 2023-06-23 RX ORDER — DIPHENHYDRAMINE HCL 25 MG
25 CAPSULE ORAL
Status: DISCONTINUED | OUTPATIENT
Start: 2023-06-23 | End: 2023-06-23 | Stop reason: HOSPADM

## 2023-06-23 RX ORDER — AMOXICILLIN 250 MG
1 CAPSULE ORAL 2 TIMES DAILY
Qty: 30 TABLET | Refills: 0 | Status: SHIPPED | OUTPATIENT
Start: 2023-06-23

## 2023-06-23 RX ADMIN — ACETAMINOPHEN 975 MG: 325 TABLET, FILM COATED ORAL at 04:04

## 2023-06-23 RX ADMIN — TOLTERODINE TARTRATE 2 MG: 2 TABLET, FILM COATED ORAL at 08:43

## 2023-06-23 RX ADMIN — SACUBITRIL AND VALSARTAN 1 TABLET: 97; 103 TABLET, FILM COATED ORAL at 08:43

## 2023-06-23 RX ADMIN — POLYETHYLENE GLYCOL 3350 17 G: 17 POWDER, FOR SOLUTION ORAL at 08:44

## 2023-06-23 RX ADMIN — FUROSEMIDE 40 MG: 40 TABLET ORAL at 08:43

## 2023-06-23 RX ADMIN — METOPROLOL TARTRATE 100 MG: 50 TABLET, FILM COATED ORAL at 08:42

## 2023-06-23 RX ADMIN — PRAVASTATIN SODIUM 20 MG: 10 TABLET ORAL at 08:44

## 2023-06-23 RX ADMIN — POTASSIUM CHLORIDE 20 MEQ: 750 CAPSULE, EXTENDED RELEASE ORAL at 08:42

## 2023-06-23 RX ADMIN — SENNOSIDES AND DOCUSATE SODIUM 1 TABLET: 50; 8.6 TABLET ORAL at 08:44

## 2023-06-23 RX ADMIN — DOFETILIDE 500 MCG: 0.5 CAPSULE ORAL at 08:43

## 2023-06-23 RX ADMIN — FAMOTIDINE 20 MG: 20 TABLET, FILM COATED ORAL at 08:43

## 2023-06-23 RX ADMIN — DEXAMETHASONE SODIUM PHOSPHATE 6 MG: 4 INJECTION, SOLUTION INTRA-ARTICULAR; INTRALESIONAL; INTRAMUSCULAR; INTRAVENOUS; SOFT TISSUE at 08:42

## 2023-06-23 ASSESSMENT — ACTIVITIES OF DAILY LIVING (ADL)
ADLS_ACUITY_SCORE: 45
ADLS_ACUITY_SCORE: 44
ADLS_ACUITY_SCORE: 45

## 2023-06-23 NOTE — PLAN OF CARE
Occupational Therapy Discharge Summary    Reason for therapy discharge:    Discharged to home with outpatient therapy.    Progress towards therapy goal(s). See goals on Care Plan in [facility] electronic health record for goal details.  Goals met    Therapy recommendation(s):    Continued therapy is recommended.  Rationale/Recommendations:  OP PT/OT to cont progressing functional endurance and IND.

## 2023-06-23 NOTE — PLAN OF CARE
Status: s/p osteophytectomy w/ anterior discectomy and fusion to treat oropharyngeal dysphagia. Hx of controlled afib.  Vitals: VSS on 2L NC, intermittently tameka, mid to high 50s.  Neuros: Garbled and slurred speech. Baseline numbness is R pinky finger, RLE toes, LLE bottom of foot   IV: PIV SL  Labs/Electrolytes: Replaced K+, redraw in the am  Resp/trach: RA during the shift, requested NC @ 2L for the night, pt able to independently suction oral secretions   Diet: Tolerating full liquid diet well, took pills whole one at a time with water  Bowel status: BS+, LBM 6/20. Pt has scheduled Senna and Miralax, PRN MOM available.   : Voiding spontaneously in urinal  Skin: Old scar on L knee and posterior cervical spine, abrasion to R shin, anterior R Neck incision, L under arm rash, mild groin rash  Pain: Neck pain managed with scheduled tylenol and ice, effective  Activity: Up with Ax1, GB walker, up in chair part of shift  Plan: PT/OT recommending home with assist. Continue POC. Advance diet as tolerated per speech evaluations, planned video swallow study in a few days

## 2023-06-23 NOTE — PLAN OF CARE
Discharge time/date: 6/23/32 1230  Walked or Wheelchair: Wheelchiar  PIV removed: Yes  Reviewed AVS with patient: Yes  Medication due times added to AVS in EPIC: No  Verbalized understanding of discharge with teachback: Yes  Medications retrieved from pharmacy: Pt stopped on his way down  Supplies sent home: N/A  Belongings from security with patient: N/A    No changes to neuros, BM this am.

## 2023-06-23 NOTE — PROVIDER NOTIFICATION
"Esau España NP on 1608 covered by Michelle Farah MD the following message:  \"6A 12-2 Bird Kaur - Can you sign discharge order so that we can discharge by 1100 please?  Thanks  Neeraj CALVERT [id number]\"  "

## 2023-06-23 NOTE — PLAN OF CARE
Speech Language Therapy Discharge Summary    Reason for therapy discharge:    Discharged to home with outpatient therapy.    Progress towards therapy goal(s). See goals on Care Plan in [facility] electronic health record for goal details.  Goals partially met.  Barriers to achieving goals:   discharge from facility.    Therapy recommendation(s):    Continued therapy is recommended.  Rationale/Recommendations:  Recommend OP VFSS to re-assess oropharyngeal swallowing function post-operatively.    Recommend pt advance to regular textures and thin liquids. Pt should be fully upright and alert for all PO, take small sips/bites, slow pacing, and alternate between consistencies. Given significant improvement in swallow function today and possible plans for d/c home, recommend completion of OP video swallow study.

## 2023-06-23 NOTE — DISCHARGE SUMMARY
"[facility] Discharge Summary and Instructions    Bird Kaur MRN# [id number]   Age: 72 year old YOB: 1950     Date of Admission:  6/21/2023  Date of Discharge::  6/23/2023  Admitting Physician:  Michelle Genao MD  Discharge Physician:  Michelle Genao MD          Admission Diagnoses:   Oropharyngeal dysphagia [R13.12]  Dysphagia [R13.10]          Discharge Diagnosis:     Oropharyngeal dysphagia [R13.12]  Dysphagia [R13.10]     Clinically Significant Risk Factors Present on Admission          # Severe Malnutrition: based on nutrition assessment   # Obesity: Estimated body mass index is 38.53 kg/m  as calculated from the following:    Height as of this encounter: 1.803 m (5' 11\").    Weight as of this encounter: 125.3 kg (276 lb 3.8 oz).  # Benign essential hypertension  # Chronic atrial fibrillation  # Dysphagia  # Heart failure with preserved ejection fraction, NYHA class I  # CHICA         Procedures:   6/21/23  1. Cervical 3-4 osteophytectomy   2. Anterior Cervical 3-4 diskectomy and fusion  3. Use of intraoperative microscope  4. Use of intraoperative fluoroscopy  5. Use of neuromonitoring           Brief History of Illness:   The patient is a 72-year-old male with a 2 year history of progressive dysphagia that has been gradually worsening to both solids and liquids for which he underwent extensive workup with ENT and speech and swallow clinic, at which point he was identified to have a C3-C4 large osteophyte compressing the esophagus causing epiglottis inversion.  He was referred to the neurosurgery clinic for consultation for osteophytectomy.  Due to the complexity of the compression of the esophagus, we requested that our ENT colleagues perform the approach for safety while dissecting the esophagus off the spine. Patient has elected to undergo above-mentioned procedure.           Hospital Course:   Patient underwent above-mentioned procedure on 6/21/23.     Following " surgery the patient was transferred to the general neurosurgical floor.  The patient complained of incisional pain.  Patient saw some improvement of his pre-operative dysphagia  Patient was assessed by SLP who advanced his diet to regular adult diet. Patient should obtain outpatient Video Swallow Study per SLP recommendations and follow with SLP on an outpatient basis.  Patient was assessed by PT and OT who felt patient safe to return home with outpatient PT and OT.    Post operative x-rays revealed proper positioning of surgical hardware.    Patient was tolerating regular diet, voiding without hilario, passing flatus/bowel movements, and pain was controlled on oral analgesia.    Patient remained medically stable throughout course.   Patient was neurologically intact upon discharge.   Patient will follow up with Neurosurgery in 2 weeks for wound check.          Discharge Medications:     Current Discharge Medication List      START taking these medications    Details   oxyCODONE (ROXICODONE) 5 MG tablet Take 1 tablet (5 mg) by mouth every 4 hours as needed for moderate pain  Qty: 5 tablet, Refills: 0    Associated Diagnoses: S/P cervical spinal fusion      polyethylene glycol (MIRALAX) 17 GM/Dose powder Take 17 g by mouth daily  Qty: 510 g, Refills: 0    Associated Diagnoses: S/P cervical spinal fusion      senna-docusate (SENOKOT-S/PERICOLACE) 8.6-50 MG tablet Take 1 tablet by mouth 2 times daily  Qty: 30 tablet, Refills: 0    Associated Diagnoses: S/P cervical spinal fusion         CONTINUE these medications which have NOT CHANGED    Details   carboxymethylcellulose PF (REFRESH PLUS) 0.5 % ophthalmic solution Place 1 drop into both eyes 4 times daily as needed for dry eyes      dofetilide (TIKOSYN) 500 MCG capsule Take 500 mcg by mouth 2 times daily      empagliflozin (JARDIANCE) 25 MG TABS tablet Take 12.5 mg by mouth daily      finasteride (PROSCAR) 5 MG tablet Take 5 mg by mouth daily      fluticasone (FLONASE)  "50 MCG/ACT nasal spray Spray 1 spray into both nostrils daily as needed for allergies      furosemide (LASIX) 40 MG tablet Take 40 mg by mouth daily      lovastatin (MEVACOR) 20 MG tablet Take 20 mg by mouth At Bedtime      metoprolol tartrate (LOPRESSOR) 100 MG tablet Take 100 mg by mouth 2 times daily      montelukast (SINGULAIR) 10 MG tablet Take 10 mg by mouth At Bedtime      potassium chloride ER (K-TAB) 20 MEQ CR tablet 40mEq every morning and 20Meq every evening      sacubitril-valsartan (ENTRESTO)  MG per tablet Take 1 tablet by mouth 2 times daily      trospium (SANCTURA) 20 MG tablet Take 20 mg by mouth 2 times daily (before meals)      vitamin B-12 (CYANOCOBALAMIN) 100 MCG tablet Take 100 mcg by mouth daily      Vitamin D3 (CHOLECALCIFEROL) 25 mcg (1000 units) tablet Take 25 mcg by mouth daily         STOP taking these medications       apixaban ANTICOAGULANT (ELIQUIS) 5 MG tablet Comments:   Reason for Stopping:                      Exam:   Physical Exam  /78 (BP Location: Right arm)   Pulse 72   Temp 98  F (36.7  C) (Oral)   Resp 18   Ht 1.803 m (5' 11\")   Wt 125.3 kg (276 lb 3.8 oz)   SpO2 96%   BMI 38.53 kg/m    Gen: Appears comfortable, NAD  Wound: Incision, clean, dry, intact without strikethrough  Neurologic:  - Alert & Oriented to person, place, time, and situation  - Follows commands briskly  - Speech fluent, spontaneous. No aphasia or dysarthria.  - No gaze preference. No apparent hemineglect.  - PERRL, EOMI  - Strong eye closure, jaw clench, and cheek puff  - Face symmetric with sensation intact to light touch  - Palate elevates symmetrically, uvula midline, tongue protrudes midline  - Trapezii and sternocleidomastoid muscles 5/5 bilaterally  - No pronator drift       Del Tr Bi WE WF Gr   R 5 5 5 5 5 5   L 5 5 5 5 5 5     HF KE KF DF PF EHL   R 5 5 5 5 5 5   L 5 5 5 5 5 5      Reflexes 2+ throughout     Sensation intact and symmetric to light touch throughout            " Discharge Instructions and Follow-Up:     Discharge diet: Regular   Discharge activity: You may advance activity as tolerated. No strenuous exercise or heay lifting greater than 10 lbs for 4 weeks or until seen and cleared in clinic.     Discharge follow-up: Follow-up with Neurosurgery [facility] on 7/5/23 for wound check/post-hospital evaluation.    Follow-up Dr. Michelle Genao MD in 6 weeks, all additional follow-up visits to be determined by Dr. Michelle Genao MD       Wound care: Ok to shower,however no scrubbing of the wound and no soaking of the wound, meaning no bathtubs or swimming pools. Pat dry only. Leave wound open to air.  Patient to have wound checked 2 weeks following surgery.    Wound location: Anterior neck  Closure technique: Absorbable sutures  Dressing needs: None  Post-op care at follow-up: Keep dry and clean       Please call if you have:  1. increased pain, redness, drainage, swelling at your incision  2. fevers > 101.5 F degrees  3. with any questions or concerns.  You may reach the Neurosurgery clinic at [phone] during regular work hours. ER at [phone].    and ask for the Neurosurgery Resident on call at [phone], during off hours or weekends.         Discharge Disposition:     Discharged to home        Felipa Thurman PA-C  Neurosurgery Department  Pager: [phone]

## 2023-06-23 NOTE — PLAN OF CARE
Status: s/p osteophytectomy w/ anterior discectomy and fusion to treat oropharyngeal dysphagia. Hx of controlled afib.  Vitals: VSS w/in parameters. Known afib  Neuros: Garbled and slurred speech, improving throughout the day, Baseline numbness is R pinky finger and soles of feet bilaterally  IV: PIV SL  Labs/Electrolytes: Replaced K+, redraw in the am  Resp/trach: RA during the shift, requested NC @ 2L for the night, pt able to independently suction oral secretions   Diet: Tolerating full liquid diet well, pills with pudding and water this herlinda  Bowel status: BS+, LBM 6/20  : Goldberg removed previous shift, voided 200 mL post removal  Skin: Old scar on L knee and posterior cervical spine, abrasion to R shin, anterior R Neck incision, L under arm rash, mild groin rash  Pain: Neck pain managed with scheduled tylenol and ice, effective  Activity: Up with Ax1, GB walker  Social: Wife at the bedside for the shift, supportive and involved  Plan: Continue POC, monitor intake and output

## 2023-06-26 ENCOUNTER — TRANSCRIBE ORDERS (OUTPATIENT)
Dept: OTHER | Age: 73
End: 2023-06-26

## 2023-06-26 DIAGNOSIS — M54.2 NECK PAIN: ICD-10-CM

## 2023-06-26 DIAGNOSIS — M54.9 BACK PAIN: ICD-10-CM

## 2023-06-26 DIAGNOSIS — R13.10 DYSPHAGIA, UNSPECIFIED: Primary | ICD-10-CM

## 2023-06-26 DIAGNOSIS — M25.70 OSTEOPHYTE: ICD-10-CM

## 2023-06-29 ENCOUNTER — TRANSCRIBE ORDERS (OUTPATIENT)
Dept: OTHER | Age: 73
End: 2023-06-29

## 2023-06-29 DIAGNOSIS — R13.10 DYSPHAGIA, UNSPECIFIED: Primary | ICD-10-CM

## 2023-06-30 DIAGNOSIS — R13.10 DYSPHAGIA, UNSPECIFIED TYPE: Primary | ICD-10-CM

## 2023-07-03 ENCOUNTER — THERAPY VISIT (OUTPATIENT)
Dept: PHYSICAL THERAPY | Facility: REHABILITATION | Age: 73
End: 2023-07-03
Payer: COMMERCIAL

## 2023-07-03 DIAGNOSIS — M54.9 BACK PAIN: ICD-10-CM

## 2023-07-03 DIAGNOSIS — M25.60 RANGE OF MOTION DEFICIT: Primary | ICD-10-CM

## 2023-07-03 DIAGNOSIS — M54.2 NECK PAIN: ICD-10-CM

## 2023-07-03 DIAGNOSIS — E66.01 MORBID OBESITY (H): ICD-10-CM

## 2023-07-03 PROCEDURE — 97110 THERAPEUTIC EXERCISES: CPT | Mod: GP

## 2023-07-03 PROCEDURE — 97162 PT EVAL MOD COMPLEX 30 MIN: CPT | Mod: GP

## 2023-07-03 NOTE — PROGRESS NOTES
"PHYSICAL THERAPY EVALUATION  Type of Visit: Evaluation    See electronic medical record for Abuse and Falls Screening details.    Subjective      Presenting condition or subjective complaint: Bird is here with wife for initial evaluation. He had a C3-4 spinal fusion on 6/21/23 and is having a lot of post-op pain and limited ROM. Has anterior lateral incision on R side from procedure. After surgery now he is getting significant sharp hot pain going down R shoulder when he tries to turn his neck towards the R. Was not present prior to the surgery. Some lower neck pain for the past couple years that has been mild but this is much more sharp. If he looks away and tries to relax it then it will go away. 10/10 pain with these episodes. 3-4/10 at rest and current. Wife states also that his walking is getting worse, he is shuffling more and hard for him to get up out of a chair. This has been getting worse over the past couple years. Has gotten worse since the choking started about 2 years ago. Tremors started 2 years ago, has them in BLEs and BUEs, travelling up into lower jaw as well. Neurologist is calling it a central tremor. Will be getting an MRI to see if it's Parkinson's.  Date of onset: 06/21/23 (date of surgery)    Relevant medical history: Per chart review: \"The patient is a 72-year-old male with a 2 year history of progressive dysphagia that has been gradually worsening to both solids and liquids for which he underwent extensive workup with ENT and speech and swallow clinic, at which point he was identified to have a C3-C4 large osteophyte compressing the esophagus causing epiglottis inversion.  He was referred to the neurosurgery clinic for consultation for osteophytectomy.  Due to the complexity of the compression of the esophagus, we requested that our ENT colleagues perform the approach for safety while dissecting the esophagus off the spine. Patient has elected to undergo above-mentioned procedure.\" Was " hospitalized 6/21/23-6/23/23 for a C3-C4 spinal fusion.   Dates & types of surgery:   See above.     Prior therapy history for the same diagnosis, illness or injury: No      Prior Level of Function   Transfers: Independent  Ambulation: Independent  ADL: Independent    Living Environment  Social support: With a significant other or spouse   Type of home: [facility]; 1 level   Stairs to enter the home: Yes   Is there a railing: No   Ramp: No   Stairs inside the home: No   Is there a railing: No   Equipment owned: Straight Cane; Walker with wheels (has this equipment but does not use them. sometimes will use cane if back is extra painful)     Employment: No      Patient goals for therapy:   Decrease pain, improve mobility     Pain assessment:  Pain in neck and R shoulder down into arm. Describes it as sharp and hot. 10/10.      Objective   Cognitive Status Examination  Orientation: Oriented to person, place and time   Level of Consciousness: Alert  Follows Commands and Answers Questions: 100% of the time  Personal Safety and Judgement: Intact  Memory: Intact    OBSERVATION: flat affect, quieter speech   INTEGUMENTARY: Incision healing well, mild swelling noted compared to L side of neck  POSTURE: WFL  RANGE OF MOTION: LE ROM WFL, UE ROM: R UE elevation limited to ~90 degrees, LUE WFL. Cervical ROM: R rotation limited to midline otherwise creating sharp pain and spasm. L rotation to ~15 degrees. Did not attempt lateral flexion d/t pain. Extension to ~10 degrees past neutral, flexion to ~45 degrees. When turning head to L also compensates by bringing shoulders/trunk with, as well.   STRENGTH: BLE strength WFL, 4 to 4+/5 in all motions. UE strength: shoulder elevation 4/5 B, limited by pain, elbow flexion/extension 5/5 B.     TRANSFERS:  IND, needs UEs to push up from chair     GAIT: Demonstrates shorter steps and stride length with mild shuffling of feet. Slower pace. B toe out.   Level of [place]:  Independent  Assistive Device(s): None  Gait Deviations: Base of support decreased  Stance time decreased  Stride length decreased  [place] decreased  Stairs: B hand railing use, reciprocal pattern going up, step to pattern going down.     BALANCE:  Balance is impaired, suspect this was going on before surgery but exaggerated after surgery d/t guarding and fear of neck pain.     FGA: 12/30; this is limited as we could not test walking with head turns/nods d/t neck pain and limitations in ROM. Does indicate fall risk.       Assessment & Plan   CLINICAL IMPRESSIONS   Medical Diagnosis: Back pain, neck pain, dysphagia, osteophyte    Treatment Diagnosis: Impaired ROM, impaired functional mobility, pain   Impression/Assessment:  Bird is a 71 yo male who presents to PT s/p C3-C4 spinal fusion. He demonstrates significant pain in neck and RUE with ROM limitations leading to decreased function. He will benefit from skilled PT intervention targeting his impairments in order to improve ROM and decrease pain to aid in participation in daily activities.     Clinical Decision Making (Complexity):   Clinical Presentation: Evolving/Changing  Clinical Presentation Rationale: based on medical and personal factors listed in PT evaluation  Clinical Decision Making (Complexity): Moderate complexity    PLAN OF CARE  Treatment Interventions:  Interventions: Gait Training, Manual Therapy, Neuromuscular Re-education, Therapeutic Activity, Therapeutic Exercise    Long Term Goals     PT Goal 1  Goal Identifier: ROM  Goal Description: Bird will demonstrate cervical rotation to at least 45 degrees in each direction without compensations in order to work towards returning to driving.  Target Date: 09/30/23  PT Goal 2  Goal Identifier: Pain  Goal Description: Bird will report 0-2/10 pain in both neck and RUE over a 2 week time period in order to improve QOL.  Target Date: 09/30/23  PT Goal 3  Goal Identifier: RUE ROM  Goal Description:  Bird will raise RUE overhead to at least 120 degrees and symmetrical with LUE with no pain reported in order to reach OH for objects.  Target Date: 09/30/23      Frequency of Treatment: 1-2x/week  Duration of Treatment: 12 weeks    Education Assessment:   Learner/Method: Patient;Significant Other  Education Comments: results of assessment, POC, HEP    Risks and benefits of evaluation/treatment have been explained.   Patient/Family/caregiver agrees with Plan of Care.     Evaluation Time:     PT Eval, Moderate Complexity Minutes (21593): 25    Signing Clinician: JER RAYMUNDO [facility]                                                                                   OUTPATIENT PHYSICAL THERAPY    PLAN OF TREATMENT FOR OUTPATIENT REHABILITATION   Patient's Last Name, First Name, Bird Bejarano YOB: 1950   Provider's Name   [facility]   Medical Record No.  [id number]     Onset Date: 06/21/23 (date of surgery)  Start of Care Date: 07/03/2023     Medical Diagnosis:  Back pain, neck pain, dysphagia, osteophyte      PT Treatment Diagnosis:  Impaired ROM, impaired functional mobility, pain Plan of Treatment  Frequency/Duration: 1-2x/week/ 12 weeks    Certification date from 07/03/2023 to 09/25/2023        See note for plan of treatment details and functional goals     SHAUN RUSSELL, PT                                             I CERTIFY THE NEED FOR THESE SERVICES FURNISHED UNDER        THIS PLAN OF TREATMENT AND WHILE UNDER MY CARE     (Physician attestation of this document indicates review and certification of the therapy plan).              Referring Provider:  No ref. provider found    Initial Assessment  See Epic Evaluation-

## 2023-07-05 ENCOUNTER — TELEPHONE (OUTPATIENT)
Dept: OTOLARYNGOLOGY | Facility: CLINIC | Age: 73
End: 2023-07-05
Payer: COMMERCIAL

## 2023-07-05 ENCOUNTER — OFFICE VISIT (OUTPATIENT)
Dept: NEUROSURGERY | Facility: CLINIC | Age: 73
End: 2023-07-05
Payer: COMMERCIAL

## 2023-07-05 VITALS — HEART RATE: 60 BPM | SYSTOLIC BLOOD PRESSURE: 132 MMHG | OXYGEN SATURATION: 95 % | DIASTOLIC BLOOD PRESSURE: 85 MMHG

## 2023-07-05 DIAGNOSIS — Z98.1 S/P CERVICAL SPINAL FUSION: Primary | ICD-10-CM

## 2023-07-05 PROCEDURE — 99024 POSTOP FOLLOW-UP VISIT: CPT | Performed by: PHYSICIAN ASSISTANT

## 2023-07-05 RX ORDER — METHOCARBAMOL 500 MG/1
500 TABLET, FILM COATED ORAL 4 TIMES DAILY PRN
Qty: 42 TABLET | Refills: 0 | Status: SHIPPED | OUTPATIENT
Start: 2023-07-05

## 2023-07-05 ASSESSMENT — PAIN SCALES - GENERAL: PAINLEVEL: MODERATE PAIN (4)

## 2023-07-05 NOTE — TELEPHONE ENCOUNTER
This patient needs the Video Swallow Study scheduled ordered by Dr. Ang. [facility] preferred location per provider

## 2023-07-05 NOTE — LETTER
7/5/2023       RE: Bird Kaur  [address] [facility] [address]     Dear Colleague,    Thank you for referring your patient, Bird Kaur, to the [facility] NEUROSURGERY [facility]IC [place] at [facility]. Please see a copy of my visit note below.        Neurosurgery Clinic Note    Chief Complaint: 2 week post-op visit    DATE OF VISIT: 7/5/2023      Procedure Date: 06/21/2023     PREOPERATIVE DIAGNOSIS:  Oropharyngeal dysphagia.     POSTOPERATIVE DIAGNOSIS:  Oropharyngeal dysphagia.     PROCEDURE PERFORMED:    1. Cervical 3-4 osteophytectomy   2. Anterior Cervical 3-4 diskectomy and fusion  3. Use of intraoperative microscope  4. Use of intraoperative fluoroscopy  5. Use of neuromonitoring     SURGEON:    Panel 1.  Esther Quintanilla MD and Selene Tom MD     Panel 2.   Michelle Genao MD and Juanis Lorenzo MD     ANESTHESIA:  General.     ESTIMATED BLOOD LOSS:  20 mL.     DRAINS:  None.     SPECIMENS:  None.     FINDINGS:  Completed osteophytectomy, confirmed level with x-ray.     COMPLICATIONS:  None.     IMPLANTS:    1.  Endoskeleton TCS interbody system, medium, serial #[id number], [employer]tronic, lot number [id number], 6 mm, 16 mm x 14 mm.  2.  Plate type, Titan TCS plate, serial #[id number], lot number [id number].  3.  Screws, VN 14 mm, 3.55 x 14 mm, endoskeleton TCS spine CRV interbody, lot #[id number], Medtronic x2.    4.  1 mL [place] Putty.     INDICATIONS FOR PROCEDURE:  The patient is a 72-year-old male with a 2 year history of progressive dysphagia that has been gradually worsening to both solids and liquids for which he underwent extensive workup with ENT and speech and swallow clinic, at which point he was identified to have a C3-C4 large osteophyte compressing the esophagus causing epiglottis inversion.  He was referred to the neurosurgery clinic for consultation for osteophytectomy.  Due to the complexity of the  compression of the esophagus, we requested that our ENT colleagues perform the approach for safety while dissecting the esophagus off the spine.  The risks and benefits were discussed with the patient, and he provided consent for the above-mentioned procedure.    HPI: Bird Kaur is a pleasant 72 year old male who is s/p C3/4 osteophytectomy and ACDF by Dr. Genao on 6/21/23 for oropharyngeal dysphagia.    Currently, patient is 2 weeks out from surgery.  He had improved ability to swallow right after the procedure.  He said it was rough for about 4-5 days with swallowing things, but he is now eating normal foods.  He still has a hoarse voice.  He is unable to get a swallow evaluation until August.  He is only taking Tylenol for pain because he did not feel good on the oxycodone.  He endorses pain more on the right side of his neck and has issues with turning that way.  He has no other new complaints.  He has already started PT at [facility] and they are having him work on neck rotation, but only up to the point of pain.  He is very limited on rotation at this point.       Physical Exam   There were no vitals taken for this visit.    Constitutional: Alert, no acute distress.    Mellen:  ambulates slowly w/o assistance.    Note he has resting/rolling tremors at rest in all 4 extremities.      Neurological:    Strength (L/R)  5/5 Deltoid  5/5 Bicep  5/5 Tricep  5/5 Handgrip    5/5 Hip Flexion  5/5 Knee Extension      Incision:  Clean, dry and intact.  There is swelling around the incision, but no concerns for infection or hematoma.       IMAGING:  No new imaging.    ASSESSMENT:  Bird Kaur is a 72 year old male who is s/p C3/4 osteophytectomy and ACDF by Dr. Genao on 6/21/23 for oropharyngeal dysphagia.    Patient is about 2 weeks out from surgery and is very happy with his progress and the results of the surgery.  Muscle tension in neck to be expected.  Only taking Tylenol for pain at this point because  he doesn't like how the oxycodone makes him feel.      PLAN:      Continue Vitamin D for 1 year post-op.  Avoid NSAIDs (Ibuprofen, Motrin, Advil, etc.) for 3 months post-op as this may inhibit the fusion process.  Wean off narcotic pain medications such as Oxycodone.  Take Tylenol, muscle relaxer, ice/heat for pain.     Okay to shower and get incision wet.  No submerging/soaking it until it is fully healed (around 4 weeks post-op).   Continue to increase time ambulating, avoid bending, twisting, strenuous exercise, or heavy lifting (greater than 10 pounds).   No driving.    Follow up in 4 weeks with upright cervical XR.  Prescription for Robaxin sent to pharmacy downstairs to try.  Okay to discontinue if he has any unwanted SE.   PT okay, but should avoid moving past any pain.      Patient is in agreement with this plan and states no further questions.              Again, thank you for allowing me to participate in the care of your patient.      Sincerely,    Katelynn Mccormick PA-C

## 2023-07-05 NOTE — PROGRESS NOTES
Neurosurgery Clinic Note    Chief Complaint: 2 week post-op visit    DATE OF VISIT: 7/5/2023      Procedure Date: 06/21/2023     PREOPERATIVE DIAGNOSIS:  Oropharyngeal dysphagia.     POSTOPERATIVE DIAGNOSIS:  Oropharyngeal dysphagia.     PROCEDURE PERFORMED:    1. Cervical 3-4 osteophytectomy   2. Anterior Cervical 3-4 diskectomy and fusion  3. Use of intraoperative microscope  4. Use of intraoperative fluoroscopy  5. Use of neuromonitoring     SURGEON:    Panel 1.  Esther Quintanilla MD and Selene Tom MD     Panel 2.   Michelle Genao MD and Juanis Lorenzo MD     ANESTHESIA:  General.     ESTIMATED BLOOD LOSS:  20 mL.     DRAINS:  None.     SPECIMENS:  None.     FINDINGS:  Completed osteophytectomy, confirmed level with x-ray.     COMPLICATIONS:  None.     IMPLANTS:    1.  Endoskeleton TCS interbody system, medium, serial #[id number], Medtronic, lot number [id number], 6 mm, 16 mm x 14 mm.  2.  Plate type, Titan TCS plate, serial #[id number], lot number [id number].  3.  Screws, VN 14 mm, 3.55 x 14 mm, endoskeleton TCS spine CRV interbody, lot #[id number], Medtronic x2.    4.  1 mL Jaja Putty.     INDICATIONS FOR PROCEDURE:  The patient is a 72-year-old male with a 2 year history of progressive dysphagia that has been gradually worsening to both solids and liquids for which he underwent extensive workup with ENT and speech and swallow clinic, at which point he was identified to have a C3-C4 large osteophyte compressing the esophagus causing epiglottis inversion.  He was referred to the neurosurgery clinic for consultation for osteophytectomy.  Due to the complexity of the compression of the esophagus, we requested that our ENT colleagues perform the approach for safety while dissecting the esophagus off the spine.  The risks and benefits were discussed with the patient, and he provided consent for the above-mentioned procedure.    HPI: Bird Kuar is a pleasant 72 year old male who is s/p C3/4  osteophytectomy and ACDF by Dr. Genao on 6/21/23 for oropharyngeal dysphagia.    Currently, patient is 2 weeks out from surgery.  He had improved ability to swallow right after the procedure.  He said it was rough for about 4-5 days with swallowing things, but he is now eating normal foods.  He still has a hoarse voice.  He is unable to get a swallow evaluation until August.  He is only taking Tylenol for pain because he did not feel good on the oxycodone.  He endorses pain more on the right side of his neck and has issues with turning that way.  He has no other new complaints.  He has already started PT at [facility] and they are having him work on neck rotation, but only up to the point of pain.  He is very limited on rotation at this point.       Physical Exam   There were no vitals taken for this visit.    Constitutional: Alert, no acute distress.    [place]:  ambulates slowly w/o assistance.    Note he has resting/rolling tremors at rest in all 4 extremities.      Neurological:    Strength (L/R)  5/5 Deltoid  5/5 Bicep  5/5 Tricep  5/5 Handgrip    5/5 Hip Flexion  5/5 Knee Extension      Incision:  Clean, dry and intact.  There is swelling around the incision, but no concerns for infection or hematoma.       IMAGING:  No new imaging.    ASSESSMENT:  Bird Kaur is a 72 year old male who is s/p C3/4 osteophytectomy and ACDF by Dr. Genao on 6/21/23 for oropharyngeal dysphagia.    Patient is about 2 weeks out from surgery and is very happy with his progress and the results of the surgery.  Muscle tension in neck to be expected.  Only taking Tylenol for pain at this point because he doesn't like how the oxycodone makes him feel.      PLAN:      Continue Vitamin D for 1 year post-op.  Avoid NSAIDs (Ibuprofen, Motrin, Advil, etc.) for 3 months post-op as this may inhibit the fusion process.  Wean off narcotic pain medications such as Oxycodone.  Take Tylenol, muscle relaxer, ice/heat for pain.     Okay to shower  and get incision wet.  No submerging/soaking it until it is fully healed (around 4 weeks post-op).   Continue to increase time ambulating, avoid bending, twisting, strenuous exercise, or heavy lifting (greater than 10 pounds).   No driving.    Follow up in 4 weeks with upright cervical XR.  Prescription for Robaxin sent to pharmacy downstairs to try.  Okay to discontinue if he has any unwanted SE.   PT okay, but should avoid moving past any pain.      Patient is in agreement with this plan and states no further questions.      Katelynn Mccormick PA-C  Department of Neurosurgery  Office: [phone]

## 2023-07-05 NOTE — PATIENT INSTRUCTIONS
Follow up in 4 weeks w/ XR.    Continue with current restrictions of no lifting 10 pounds, no bending/twisting activities.  Robaxin script sent to pharmacy downstairs.

## 2023-07-13 ENCOUNTER — THERAPY VISIT (OUTPATIENT)
Dept: OCCUPATIONAL THERAPY | Facility: REHABILITATION | Age: 73
End: 2023-07-13
Payer: COMMERCIAL

## 2023-07-13 DIAGNOSIS — Z78.9 DECREASED ACTIVITIES OF DAILY LIVING (ADL): Primary | ICD-10-CM

## 2023-07-13 DIAGNOSIS — Z98.1 S/P CERVICAL SPINAL FUSION: ICD-10-CM

## 2023-07-13 PROCEDURE — 97535 SELF CARE MNGMENT TRAINING: CPT | Mod: GO | Performed by: OCCUPATIONAL THERAPIST

## 2023-07-13 PROCEDURE — 97165 OT EVAL LOW COMPLEX 30 MIN: CPT | Mod: GO | Performed by: OCCUPATIONAL THERAPIST

## 2023-07-13 NOTE — PROGRESS NOTES
"OCCUPATIONAL THERAPY EVALUATION  Type of Visit: Evaluation    See electronic medical record for Abuse and Falls Screening details.    Presenting condition or subjective complaint: Per PT evaluation, \"Bird is here with wife for initial evaluation. He had a C3-4 spinal fusion on 6/21/23 and is having a lot of post-op pain and limited ROM. Has anterior lateral incision on R side from procedure. After surgery now he is getting significant sharp hot pain going down R shoulder when he tries to turn his neck towards the R. Was not present prior to the surgery. Some lower neck pain for the past couple years that has been mild but this is much more sharp. If he looks away and tries to relax it then it will go away. 10/10 pain with these episodes. 3-4/10 at rest and current. Wife states also that his walking is getting worse, he is shuffling more and hard for him to get up out of a chair. This has been getting worse over the past couple years. Has gotten worse since the choking started about 2 years ago. Tremors started 2 years ago, has them in BLEs and BUEs, travelling up into lower jaw as well. Neurologist is calling it a central tremor. Will be getting an MRI to see if it's Parkinson's.\"  Date of onset: 06/21/23 (date of surgery)    Relevant medical history: Per chart review: \"The patient is a 72-year-old male with a 2 year history of progressive dysphagia that has been gradually worsening to both solids and liquids for which he underwent extensive workup with ENT and speech and swallow clinic, at which point he was identified to have a C3-C4 large osteophyte compressing the esophagus causing epiglottis inversion.  He was referred to the neurosurgery clinic for consultation for osteophytectomy.  Due to the complexity of the compression of the esophagus, we requested that our ENT colleagues perform the approach for safety while dissecting the esophagus off the spine. Patient has elected to undergo above-mentioned " "procedure.\" Was hospitalized 23-23 for a C3-C4 spinal fusion.   Dates & types of surgery:   appendectomy, left TKR, hernia repair, cardio ablation, spinal surgery in 2015 and recent surgery on 23    Subjective        Prior diagnostic imaging/testing results:       Prior therapy history for the same diagnosis, illness or injury: No      Prior Level of Function   Transfers: Independent  Ambulation: Independent  ADL: Independent  IADL: Independent with increased time to complete tasks    Living Environment  Social support: With a significant other or spouse   Type of home: [facility]; 1 level , no need to go to the basement  Stairs to enter the home: Yes 3    Is there a railing: No   Ramp: No   Stairs inside the home: No   Is there a railing: No   Help at home:   wife is able to help  Equipment owned: Straight Cane; Walker with wheels (has this equipment but does not use them. sometimes will use cane if back is extra painful)     Employment: No    Hobbies/Interests:      Patient goals for therapy:      Pain assessment: Pain present  Location: neck/Ratin/10     Objective     Cognitive Status Examination  Orientation: Oriented to person, place and time   Level of Consciousness: Alert  Follows Commands and Answers Questions: 100% of the time  Personal Safety and Judgement: Intact  Memory: Intact  Attention: No deficits identified  Organization/Problem Solving: No deficits identified  Executive Function: No deficits identified    VISUAL SKILLS  Visual Acuity: No deficits identified, Wears glasses  Visual Field: Appears normal   Visual Attention: Appears normal    SENSATION: Patient reports numbness and tingling in right hand and both feet    RANGE OF MOTION: No reaching overhead and no lifting over 10# for 3 months. Patient AROM from elbow distally is WNL    STRENGTH:   Pain: - none + mild ++ moderate +++ severe  Strength Scale: 0-5/5 Left Right   Elbow Flexion 5 5   Elbow Extension 5 5    Strength " (lbs) 85# 90#   Hand Dominance: Right  COORDINATION: WFL  BALANCE: Patient reports that his balance is off. He will be working with PT for this    FUNCTIONAL MOBILITY  Assistive Device(s): None    BED MOBILITY: Min Assist. Patient states that he is sleeping in a recliner because laying in bed causes pain in his neck and is difficulty to move in bed.    TRANSFERS: Independent    BATHING: Independent  Equipment: Shower chair/Tub bench but does not use because it doesn't fit in the tub/shower set up    UPPER BODY DRESSING: Independent  Equipment: none    LOWER BODY DRESSING: Independent but very difficult to get knee high compression socks on.   Equipment: None    TOILETING: Independent  Equipment: None    GROOMING: Independent  Equipment: None    EATING/SELF FEEDING: Independent   Equipment: None    ACTIVITY TOLERANCE: Fair    INSTRUMENTAL ACTIVITIES OF DAILY LIVING (IADL):   Meal Planning/Prep: Patient's wife does the cooking  Home Management: Patient has hired help for outdoor chores  Financial Management: Wife is doing this now  Communication/Computer Use: Independent  Community Mobility: Patient states that he was told not to drive for 3 months post surgery.  Care of Others: N/A    Assessment & Plan   CLINICAL IMPRESSIONS   Medical Diagnosis: s/p cervical spine fusion    Treatment Diagnosis: decreased ADL and IADL function    Impression/Assessment: Pt is a 72 year old male presenting to Occupational Therapy due to impaired ADL and IADL function post neck surgery.  The following significant findings have been identified: Impaired mobility.  These identified deficits interfere with their ability to perform self care tasks and household chores as compared to previous level of function.     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   Patient agrees with Plan of Care.     Evaluation Time:    OT Eval, Low Complexity Minutes (71811): 30     Signing Clinician: Shahida Hernandez OT

## 2023-07-14 ENCOUNTER — THERAPY VISIT (OUTPATIENT)
Dept: PHYSICAL THERAPY | Facility: REHABILITATION | Age: 73
End: 2023-07-14
Payer: COMMERCIAL

## 2023-07-14 DIAGNOSIS — M54.2 NECK PAIN: ICD-10-CM

## 2023-07-14 DIAGNOSIS — M25.60 RANGE OF MOTION DEFICIT: ICD-10-CM

## 2023-07-14 DIAGNOSIS — M54.9 BACK PAIN: Primary | ICD-10-CM

## 2023-07-14 PROCEDURE — 97110 THERAPEUTIC EXERCISES: CPT | Mod: GP

## 2023-07-26 ENCOUNTER — THERAPY VISIT (OUTPATIENT)
Dept: PHYSICAL THERAPY | Facility: REHABILITATION | Age: 73
End: 2023-07-26
Payer: COMMERCIAL

## 2023-07-26 DIAGNOSIS — M25.60 RANGE OF MOTION DEFICIT: ICD-10-CM

## 2023-07-26 DIAGNOSIS — M54.2 NECK PAIN: Primary | ICD-10-CM

## 2023-07-26 PROCEDURE — 97140 MANUAL THERAPY 1/> REGIONS: CPT | Mod: GP

## 2023-07-26 PROCEDURE — 97110 THERAPEUTIC EXERCISES: CPT | Mod: GP

## 2023-08-03 ENCOUNTER — THERAPY VISIT (OUTPATIENT)
Dept: PHYSICAL THERAPY | Facility: REHABILITATION | Age: 73
End: 2023-08-03
Payer: COMMERCIAL

## 2023-08-03 ENCOUNTER — THERAPY VISIT (OUTPATIENT)
Dept: SPEECH THERAPY | Facility: REHABILITATION | Age: 73
End: 2023-08-03
Payer: COMMERCIAL

## 2023-08-03 DIAGNOSIS — M54.9 BACK PAIN: ICD-10-CM

## 2023-08-03 DIAGNOSIS — M54.2 NECK PAIN: Primary | ICD-10-CM

## 2023-08-03 DIAGNOSIS — Z98.1 S/P CERVICAL SPINAL FUSION: ICD-10-CM

## 2023-08-03 DIAGNOSIS — M25.60 RANGE OF MOTION DEFICIT: ICD-10-CM

## 2023-08-03 PROCEDURE — 92522 EVALUATE SPEECH PRODUCTION: CPT | Mod: GN | Performed by: SPEECH-LANGUAGE PATHOLOGIST

## 2023-08-03 PROCEDURE — 97110 THERAPEUTIC EXERCISES: CPT | Mod: 59

## 2023-08-03 PROCEDURE — 97530 THERAPEUTIC ACTIVITIES: CPT | Mod: GP

## 2023-08-03 NOTE — PROGRESS NOTES
SPEECH LANGUAGE PATHOLOGY EVALUATION    See electronic medical record for Abuse and Falls Screening details.    Subjective      Presenting condition or subjective complaint:  Pt is a 72 year old male with a PMH including dysphagia, asthma, sleep apnea, obsety, chronic and allergic rhinitis. Patient reports dysphagia onset ~2 years ago.  He has been seen by speech therapy in January 2022 and again 2023 for evaluation of swallow by both FEES and VFSS, with noted osteophyte at C3/4 impacting epiglottic inversion.  Patient underwent C3/4 osteophytectomy with anterior discectomy and fusion on 6/21/23.  He was evaluated at bedside by speech therapy post operatively with initial coughing with intake, but recommended regular and thin liquids upon discharge.  Per patient, he has been able to swallow foods and drink liquids without choking since his surgery.  He denied discomfort with swallowing.  He does still break up a large pill capsule in the morning, but otherwise noted no ongoing diet restrictions.  Patient has a video swallow study scheduled for 8/8/23 to reassess swallow function.    On today's date, patient reports his primary concern is slurred speech.  He noted this was present prior to the surgery.  He stated that it  feels like my tongue is getting lazy  and  I can't be heard, I just don't have the support  (breath support) he did.  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  Patient endorses an onset of tremors  a couple years ago  initially in his legs, but now in his hands, jaw, and tongue additionally.    Patient has upcoming appointments with medical care team to complete an MRI to assess for a small stroke vs Parkinson's.  Date of onset: 06/26/23    Relevant medical history:   See above  Dates & types of surgery:  appendectomy, left TKR, hernia repair, cardio ablation, spinal surgery in 2015 and recent surgery on 6-21-23     Prior diagnostic imaging/testing results:     Previous VFSS and FEES for  swallowing, repeat VFSS scheduled for 8/8  Prior therapy history for the same diagnosis, illness or injury:     Previously evaluated and provided exercises for swallowing, no prior speech therapy for speech/voice    Living Environment  Social support: With a significant other or spouse   [facility] 1 level , no need to go to the basement  Stairs to enter the home: Yes 3    Is there a railing: No   Ramp: No   Stairs inside the home: No   Is there a railing: No   Help at home:   wife is able to help  Equipment owned: Straight Cane; Walker with wheels (has this equipment but does not use them. sometimes will use cane if back is extra painful)     Employment:    No  Hobbies/Interests:      Patient goals for therapy:  Evaluate speech and voice    Pain assessment:  No specific pain reported on today's date     Objective   SPEAKOUT! EVALUATION OF SPEECH & VOICE:    Vocal Intensity:   Conversation: Avg. vocal intensity: 75 dB (prior to evaluation, suspect volume would have averaged around 70 dB, significantly louder during structured conversation vs initial conversation).  Paragraph reading: Avg. vocal intensity: 77 dB  Avg. intensity of sustained phonation on /a/: 87 dB fading to 73 dB for 9-13 seconds.       Appears WFL Mild Impairment Moderate Impairment Severe Impairment Profound Impairment   Vocal Intensity  X      Breath Support  X      Intonation of Speech X       Articulation  X      Intelligibility X       Fluency X       Rate of Speech  X      Vocal Quality  X        Vocal Quality Description:   (  ) Wet vocal quality   ( X ) Glottal becerril   (  ) Pitch Breaks       (  ) Phonation Breaks   (  ) Vocal Tremors   (  ) Breathy   (  ) Aphonia    ( X ) Harsh/Hoarse  Additional Information: Patient presents with hoarse vocal quality and glottal becerril in structured speech tasks.  Patient is 100% intelligible; however, notable for decreased articulatory precision and fast rate/mumbled speech  intermittently.       STIMULABILITY TESTING:  Using techniques patient performed the following:  Sustained Phonation (average): 12 seconds at an average vocal intensity of 95 dB  Patient read phrases/sentences at a vocal intensity average of 82 dB    The following improvements were noted:   (  ) No Improvement   ( X ) Articulation  ( X ) Vocal Quality   (  ) Facial Expression    (  ) Fluency    (  ) Intonation of Speech  ( X ) Rate of Speech   (  ) Intelligibility  ( X ) Vocal Intensity   ( X ) Breath Support    Assessment & Plan   CLINICAL IMPRESSIONS   Medical Diagnosis: Dysarthria, Dysphagia R13.10    Treatment Diagnosis: Dysarthria, Dysphonia   Impression/Assessment: Patient presents with speech and voice deficits are characterized by intermittently reduced vocal intensity, breath support, hoarse vocal quality, and decreased articulatory precision.  Onset for several years, has pending medical work up for small CVA vs Parkinson's Disease per patient.  Patient with additional swallowing concerns- improved since surgery in June.  Patient has VFSS scheduled for 8/8/23 to reassess.  Patient is appropriate for skilled 1:1 ST to address his speech/voice and communicative impairments.  Patientwas informed of the results and was in agreement with the recommendations.      PLAN OF CARE  Treatment Interventions: Speech    Prognosis to achieve stated therapy goals is fair   Rehab potential is impacted by: current level of function, family/caregiver support, patient motivation, prior level of function    Long Term Goals   SLP Goal 1  Goal Identifier: 1. Speech Strategies  Goal Description: Patient will learn and demonstrate use of speech intelligibility strategies (i.e., increased volume, slow rate, over-exaggeration) during 5 minutes of structured conversation FÁTIMA by end of POC.  Rationale: To maximize functional communication within the home or community  Target Date: 09/03/23  SLP Goal 2  Goal Identifier: 2.  Conversation  Goal Description: Patient will implement compensatory strategy of projecting voice during conversation to improve vocal loudness while maintaining a minimum of 73 dB on at least 80% of time maintained across 10 minutes of structured conversation FÁTIMA by end of POC.  Rationale: To maximize functional communication within the home or community  Target Date: 09/03/23  SLP Goal 3  Goal Identifier: 3. Home Program  Goal Description: Patient will verbalize understanding of home program for speech/voice.  Rationale: To maximize functional communication within the home or community  Target Date: 09/03/23      Frequency of Treatment: weekly  Duration of Treatment: 4 weeks     Recommended Referrals to Other Professionals:  Ongoing follow-up per care team, PT, OT  Education Assessment:   Learner/Method: Patient;Listening  Education Comments: Voice/speech r/t current medical status    Risks and benefits of evaluation/treatment have been explained.   Patient/Family/caregiver agrees with Plan of Care.     Evaluation Time:    Sound production (artic, phonology, apraxia, dysarthria) Minutes (73741): 40    Signing Clinician: Olamide Genao, SLP

## 2023-08-07 ENCOUNTER — ANCILLARY PROCEDURE (OUTPATIENT)
Dept: GENERAL RADIOLOGY | Facility: CLINIC | Age: 73
End: 2023-08-07
Attending: PHYSICIAN ASSISTANT
Payer: COMMERCIAL

## 2023-08-07 ENCOUNTER — OFFICE VISIT (OUTPATIENT)
Dept: NEUROSURGERY | Facility: CLINIC | Age: 73
End: 2023-08-07
Payer: COMMERCIAL

## 2023-08-07 VITALS
OXYGEN SATURATION: 95 % | SYSTOLIC BLOOD PRESSURE: 145 MMHG | HEART RATE: 59 BPM | RESPIRATION RATE: 16 BRPM | DIASTOLIC BLOOD PRESSURE: 94 MMHG

## 2023-08-07 DIAGNOSIS — Z98.1 S/P CERVICAL SPINAL FUSION: ICD-10-CM

## 2023-08-07 DIAGNOSIS — Z98.1 S/P CERVICAL SPINAL FUSION: Primary | ICD-10-CM

## 2023-08-07 PROCEDURE — 99024 POSTOP FOLLOW-UP VISIT: CPT | Performed by: PHYSICIAN ASSISTANT

## 2023-08-07 PROCEDURE — 72040 X-RAY EXAM NECK SPINE 2-3 VW: CPT | Performed by: RADIOLOGY

## 2023-08-07 ASSESSMENT — PAIN SCALES - GENERAL: PAINLEVEL: MODERATE PAIN (4)

## 2023-08-07 NOTE — PROGRESS NOTES
Neurosurgery Clinic Note    Chief Complaint: 6 week post-op visit    DATE OF VISIT: 8/7/23    Procedure Date: 06/21/2023  PREOPERATIVE DIAGNOSIS:  Oropharyngeal dysphagia.  POSTOPERATIVE DIAGNOSIS:  Oropharyngeal dysphagia.     PROCEDURE PERFORMED:    1. Cervical 3-4 osteophytectomy   2. Anterior Cervical 3-4 diskectomy and fusion  3. Use of intraoperative microscope  4. Use of intraoperative fluoroscopy  5. Use of neuromonitoring     SURGEON:    Panel 1.  Esther Quintanilla MD and Selene Tom MD     Panel 2.   Michelle Genao MD and Juanis Lorenzo MD     ANESTHESIA:  General.     ESTIMATED BLOOD LOSS:  20 mL.     DRAINS:  None.     SPECIMENS:  None.     FINDINGS:  Completed osteophytectomy, confirmed level with x-ray.     COMPLICATIONS:  None.     IMPLANTS:    1.  Endoskeleton TCS interbody system, medium, serial #[id number], Medtronic, lot number [id number], 6 mm, 16 mm x 14 mm.  2.  Plate type, Titan TCS plate, serial #[id number], lot number [id number].  3.  Screws, VN 14 mm, 3.55 x 14 mm, endoskeleton TCS spine CRV interbody, lot #[id number], Medtronic x2.    4.  1 mL Starke Putty.     INDICATIONS FOR PROCEDURE:  The patient is a 72-year-old male with a 2 year history of progressive dysphagia that has been gradually worsening to both solids and liquids for which he underwent extensive workup with ENT and speech and swallow clinic, at which point he was identified to have a C3-C4 large osteophyte compressing the esophagus causing epiglottis inversion.  He was referred to the neurosurgery clinic for consultation for osteophytectomy.  Due to the complexity of the compression of the esophagus, we requested that our ENT colleagues perform the approach for safety while dissecting the esophagus off the spine.  The risks and benefits were discussed with the patient, and he provided consent for the above-mentioned procedure.    HPI: Bird Kaur is a pleasant 72 year old male who is s/p C3/4  osteophytectomy and ACDF by Dr. Genao on 6/21/23 for oropharyngeal dysphagia.    7/5/23 visit: patient is 2 weeks out from surgery.  He had improved ability to swallow right after the procedure.  He said it was rough for about 4-5 days with swallowing things, but he is now eating normal foods.  He still has a hoarse voice.  He is unable to get a swallow evaluation until August.  He is only taking Tylenol for pain because he did not feel good on the oxycodone.  He endorses pain more on the right side of his neck and has issues with turning that way.  He has no other new complaints.  He has already started PT at [facility] and they are having him work on neck rotation, but only up to the point of pain.  He is very limited on rotation at this point.      8/7/23 Visit: Has a muscle on the right side of his neck that is tight and limits mobility and bothers him during sleep.  Going to PT.  Swallowing is improving.  He notes worsening tremors throughout his body. Will be discussing with neurology and PCP.  Patient and wife very appreciate of the service they have received at hospital and in clinic.       Physical Exam   BP (!) 145/94   Pulse 59   Resp 16   SpO2 95%     Constitutional: Alert, no acute distress.    [place]:  ambulates slowly w/o assistance.    Note he has resting/rolling tremors at rest in all 4 extremities.  Does not have shuffling gait.      Neurological:    Strength (L/R)  5/5 Deltoid  5/5 Bicep  5/5 Tricep  5/5 Handgrip    5/5 BLE    Incision:  Healing well      IMAGING:  Upright cervical XR 8/7/23 - stable surgical hardware and alignment.  Improved prevertebral edema.          6/22/23 compared to 8/7/23 - prevertebral edema improving    ASSESSMENT:  Bird Kaur is a 72 year old male who is s/p C3/4 osteophytectomy and ACDF by Dr. Genao on 6/21/23 for oropharyngeal dysphagia.    Patient is about 6 weeks out from surgery.  He continues to improve with swallowing and pain.  Unable to turn head to  point he can check for traffic yet.      PLAN:      Continue Vitamin D for 1 year post-op.  Avoid NSAIDs (Ibuprofen, Motrin, Advil, etc.) for 3 months post-op as this may inhibit the fusion process.  Take Tylenol, muscle relaxer, ice/heat for pain. May use topicals such as Voltaren or icy hot on neck to help with tension.      Continue to increase time ambulating, avoid bending, twisting, strenuous exercise, or heavy lifting (greater than 20-25 pounds).     Follow up in 6 weeks with upright cervical XR with Dr. Genao.     Continue with PT.    May return to driving once he can safely check for traffic.      Patient is in agreement with this plan and states no further questions.      Katelynn Mccormick PA-C  Department of Neurosurgery  Office: [phone]

## 2023-08-07 NOTE — PATIENT INSTRUCTIONS
Follow up in 6 weeks with XR w/ Dr. Genao.      Continue with PT.  Once able to check for traffic, okay to resume driving.      No lifting greater than 20-25 pounds.

## 2023-08-07 NOTE — LETTER
8/7/2023       RE: Bird Kaur  [address]  [place] [address]       Dear Colleague,    Thank you for referring your patient, Bird Kaur, to the [facility] NEUROSURGERY CLINIC [place] at [facility]. Please see a copy of my visit note below.        Neurosurgery Clinic Note    Chief Complaint: 6 week post-op visit    DATE OF VISIT: 8/7/23    Procedure Date: 06/21/2023  PREOPERATIVE DIAGNOSIS:  Oropharyngeal dysphagia.  POSTOPERATIVE DIAGNOSIS:  Oropharyngeal dysphagia.     PROCEDURE PERFORMED:    1. Cervical 3-4 osteophytectomy   2. Anterior Cervical 3-4 diskectomy and fusion  3. Use of intraoperative microscope  4. Use of intraoperative fluoroscopy  5. Use of neuromonitoring     SURGEON:    Panel 1.  Esther Quintanilla MD and Selene Tom MD     Panel 2.   Michelle Genao MD and Juanis Lorenzo MD     ANESTHESIA:  General.     ESTIMATED BLOOD LOSS:  20 mL.     DRAINS:  None.     SPECIMENS:  None.     FINDINGS:  Completed osteophytectomy, confirmed level with x-ray.     COMPLICATIONS:  None.     IMPLANTS:    1.  Endoskeleton TCS interbody system, medium, serial #[id number], [employer]tronic, lot number [id number], 6 mm, 16 mm x 14 mm.  2.  Plate type, Titan TCS plate, serial #[id number], lot number [id number].  3.  Screws, VN 14 mm, 3.55 x 14 mm, endoskeleton TCS spine CRV interbody, lot #[id number], [employer]tronic x2.    4.  1 mL Jaja Putty.     INDICATIONS FOR PROCEDURE:  The patient is a 72-year-old male with a 2 year history of progressive dysphagia that has been gradually worsening to both solids and liquids for which he underwent extensive workup with ENT and speech and swallow clinic, at which point he was identified to have a C3-C4 large osteophyte compressing the esophagus causing epiglottis inversion.  He was referred to the neurosurgery clinic for consultation for osteophytectomy.  Due to the complexity of the compression of the  esophagus, we requested that our ENT colleagues perform the approach for safety while dissecting the esophagus off the spine.  The risks and benefits were discussed with the patient, and he provided consent for the above-mentioned procedure.    HPI: Bird Kaur is a pleasant 72 year old male who is s/p C3/4 osteophytectomy and ACDF by Dr. Genao on 6/21/23 for oropharyngeal dysphagia.    7/5/23 visit: patient is 2 weeks out from surgery.  He had improved ability to swallow right after the procedure.  He said it was rough for about 4-5 days with swallowing things, but he is now eating normal foods.  He still has a hoarse voice.  He is unable to get a swallow evaluation until August.  He is only taking Tylenol for pain because he did not feel good on the oxycodone.  He endorses pain more on the right side of his neck and has issues with turning that way.  He has no other new complaints.  He has already started PT at [facility] and they are having him work on neck rotation, but only up to the point of pain.  He is very limited on rotation at this point.      8/7/23 Visit: Has a muscle on the right side of his neck that is tight and limits mobility and bothers him during sleep.  Going to PT.  Swallowing is improving.  He notes worsening tremors throughout his body. Will be discussing with neurology and PCP.  Patient and wife very appreciate of the service they have received at hospital and in clinic.       Physical Exam   BP (!) 145/94   Pulse 59   Resp 16   SpO2 95%     Constitutional: Alert, no acute distress.    [place]:  ambulates slowly w/o assistance.    Note he has resting/rolling tremors at rest in all 4 extremities.  Does not have shuffling gait.      Neurological:    Strength (L/R)  5/5 Deltoid  5/5 Bicep  5/5 Tricep  5/5 Handgrip    5/5 BLE    Incision:  Healing well      IMAGING:  Upright cervical XR 8/7/23 - stable surgical hardware and alignment.  Improved prevertebral edema.          6/22/23  compared to 8/7/23 - prevertebral edema improving    ASSESSMENT:  Bird Kaur is a 72 year old male who is s/p C3/4 osteophytectomy and ACDF by Dr. Genao on 6/21/23 for oropharyngeal dysphagia.    Patient is about 6 weeks out from surgery.  He continues to improve with swallowing and pain.  Unable to turn head to point he can check for traffic yet.      PLAN:      Continue Vitamin D for 1 year post-op.  Avoid NSAIDs (Ibuprofen, Motrin, Advil, etc.) for 3 months post-op as this may inhibit the fusion process.  Take Tylenol, muscle relaxer, ice/heat for pain. May use topicals such as Voltaren or icy hot on neck to help with tension.      Continue to increase time ambulating, avoid bending, twisting, strenuous exercise, or heavy lifting (greater than 20-25 pounds).     Follow up in 6 weeks with upright cervical XR with Dr. Genao.     Continue with PT.    May return to driving once he can safely check for traffic.      Patient is in agreement with this plan and states no further questions.                Again, thank you for allowing me to participate in the care of your patient.      Sincerely,    Katelynn Mccormick PA-C

## 2023-08-08 ENCOUNTER — THERAPY VISIT (OUTPATIENT)
Dept: SPEECH THERAPY | Facility: CLINIC | Age: 73
End: 2023-08-08
Payer: COMMERCIAL

## 2023-08-08 ENCOUNTER — ANCILLARY PROCEDURE (OUTPATIENT)
Dept: GENERAL RADIOLOGY | Facility: CLINIC | Age: 73
End: 2023-08-08
Attending: OTOLARYNGOLOGY
Payer: COMMERCIAL

## 2023-08-08 DIAGNOSIS — R13.12 OROPHARYNGEAL DYSPHAGIA: ICD-10-CM

## 2023-08-08 DIAGNOSIS — R13.10 DYSPHAGIA, UNSPECIFIED TYPE: ICD-10-CM

## 2023-08-08 DIAGNOSIS — Z98.1 S/P CERVICAL SPINAL FUSION: Primary | ICD-10-CM

## 2023-08-08 PROCEDURE — 74230 X-RAY XM SWLNG FUNCJ C+: CPT | Mod: GC | Performed by: RADIOLOGY

## 2023-08-08 PROCEDURE — 92611 MOTION FLUOROSCOPY/SWALLOW: CPT | Mod: GN | Performed by: SPEECH-LANGUAGE PATHOLOGIST

## 2023-08-08 PROCEDURE — 92610 EVALUATE SWALLOWING FUNCTION: CPT | Mod: GN | Performed by: SPEECH-LANGUAGE PATHOLOGIST

## 2023-08-08 RX ORDER — BARIUM SULFATE 400 MG/ML
SUSPENSION ORAL ONCE
Status: COMPLETED | OUTPATIENT
Start: 2023-08-08 | End: 2023-08-08

## 2023-08-08 RX ADMIN — BARIUM SULFATE: 400 SUSPENSION ORAL at 08:33

## 2023-08-08 NOTE — PROGRESS NOTES
"SPEECH LANGUAGE PATHOLOGY EVALUATION    Subjective      Presenting condition or subjective complaint: Pt presents today for video swallow study after his spine surgery.   Date of onset: 06/21/23 (date of surgery)    Relevant medical history: s/p spinal fusion, oropharyngeal dysphagia  Dates & types of surgery: Cervical 3-4 osteophytectomy and anterior Cervical 3-4 diskectomy and fusion on 6/21/23    Prior diagnostic imaging/testing results: Pt saw IP SLP while admitted. Was discharged with \"recommend pt advance to regular textures and thin liquids with OP video swallow study.\"   Prior therapy history for the same diagnosis, illness or injury: Pre op VFSS in January 2023    Living Environment  Social support: Pt's wife, who is present today    Patient goals for therapy: To see how his swallow is after surgery    Pain assessment: Pain denied     Objective     SWALLOW EVALUTION  Dysphagia history: Pt reports he is taking   Current Diet/Method of Nutritional Intake: thin liquids (level 0), regular diet        CLINICAL SWALLOW EVALUATION  Oral Motor Function: Dentition: natural dentition  Secretion management: WFL  Mucosal quality: adequate  Mandibular function: intact  Oral labial function: WFL  Lingual function: mildly reduced strength/coordination  Velar function: intact   Buccal function: intact  Laryngeal function: cough, throat clear, volitional swallow, voicing WFL     Level of assist required for feeding: no assistance needed   Textures Trialed:   Clinical Swallow Eval: Thin Liquids  Mode of presentation: cup, self-fed   Volume presented: 4ox  Preparatory Phase: WFL  Oral Phase: WFL  Pharyngeal phase of swallow: intact   Diagnostic statement: No clinical s/sx of penetration/aspiration.      ADDITIONAL EVAL COMPLETED TODAY : yes; rationale: to further assess pharyngeal phase    VIDEOFLUOROSCOPIC SWALLOW STUDY  Radiologist: Resident  Views Taken: left lateral, A/P   Physical location of procedure: [facility] "   Patient sitting upright on chair/stool     VFSS textures trialed:   VFSS Eval: Thin Liquids  Mode of Presentation: cup, straw, self-fed   Order of Presentation: Series 1, 2, 3, 11AP  Preparatory Phase: WFL  Oral Phase: WFL  Bolus Location When Swallow Initiated: posterior angle of ramus  Pharyngeal Phase: pharyngeal wall coating, residue in valleculae  Rosenbeck's Penetration Aspiration Scale: 2 - contrast enters airway, remains above the vocal cords, no residue remains (penetration)  Diagnostic Statement:  x1 penetration; no aspiration. Minimal to mild vallecular residuals with pharyngeal     VFSS Eval: Mildly Thick Liquids  Mode of Presentation: cup, self-fed   Order of Presentation: Series 4  Preparatory Phase: WFL  Oral Phase: WFL  Bolus Location When Swallow Initiated: posterior angle of ramus  Pharyngeal Phase: WFL  Rosenbeck's Penetration Aspiration Scale: 1 - no aspiration, contrast does not enter airway  Diagnostic Statement: No penetration/aspiration or significant residuals.    VFSS Eval: Purees  Mode of Presentation: spoon, self-fed   Order of Presentation: Series 5, 6, 10AP  Preparatory Phase: WFL  Oral Phase: WFL  Bolus Location When Swallow Initiated: posterior angle of ramus  Pharyngeal Phase: WFL  Rosenbeck s Penetration Aspiration Scale: 1 - no aspiration, contrast does not enter airway  Diagnostic Statement: No penetration/aspiration or significant residuals. AP reveals symmetric bolus flow through oropharynx.    VFSS Eval: Solids  Mode of Presentation: self-fed   Order of Presentation: Series 7,8  Preparatory Phase: WFL  Oral Phase: WFL  Bolus Location When Swallow Initiated: posterior angle of ramus  Pharyngeal Phase: WFL   Rosenbeck s Penetration Aspiration Scale: 1 - no aspiration, contrast does not enter airway  Diagnostic Statement: No penetration/aspiration or significant residuals.    VFSS Eval: Barium Tablet  Mode of Presentation:  whole pill taken with water by cup    Order of  Presentation: Series 9  Preparatory Phase: WFL  Oral Phase: WFL  Bolus Location When Swallow Initiated: posterior angle of ramus  Pharyngeal Phase: WFL  Rosenbeck s Penetration Aspiration Scale: 1 - no aspiration, contrast does not enter airway  Diagnostic Statement: Barium tablet passed through oropharynx without difficulty.     ESOPHAGEAL PHASE OF SWALLOW  no observed or reported concerns related to esophageal function     SWALLOW ASSESSMENT CLINICAL IMPRESSIONS AND RATIONALE  Diet Consistency Recommendations: thin liquids (level 0), regular diet    Recommended Feeding/Eating Techniques: slow rate of intake, monitor for cough or change in vocal quality with intake, maintain upright sitting position for eating   Medication Administration Recommendations: as tolerated    Assessment & Plan   CLINICAL IMPRESSIONS   Medical Diagnosis: Dysphagia, unspecified    Treatment Diagnosis: Safe, functional oropharyngeal swallow response   Impression/Assessment: Pt presents today with a safe, functional oropharyngeal response. No significant oropharyngeal dysphagia identified.    PLAN OF CARE  Evaluation only    Education Assessment:   Learner/Method: Patient;Significant Other    Risks and benefits of evaluation/treatment have been explained.   Patient/Family/caregiver agrees with Plan of Care.     Evaluation Time:    SLP Eval: oral/pharyngeal swallow function, clinical minutes (47368): 11  SLP Eval: VideoFluoroscopic Swallow function Minutes (68381): 18    Signing Clinician: FEDE Canales

## 2023-08-09 ENCOUNTER — THERAPY VISIT (OUTPATIENT)
Dept: PHYSICAL THERAPY | Facility: REHABILITATION | Age: 73
End: 2023-08-09
Payer: COMMERCIAL

## 2023-08-09 DIAGNOSIS — M54.2 NECK PAIN: Primary | ICD-10-CM

## 2023-08-09 DIAGNOSIS — M25.60 RANGE OF MOTION DEFICIT: ICD-10-CM

## 2023-08-09 PROCEDURE — 97750 PHYSICAL PERFORMANCE TEST: CPT | Mod: GP

## 2023-08-09 PROCEDURE — 97110 THERAPEUTIC EXERCISES: CPT | Mod: GP

## 2023-08-09 PROCEDURE — 97116 GAIT TRAINING THERAPY: CPT | Mod: GP

## 2023-08-15 ENCOUNTER — THERAPY VISIT (OUTPATIENT)
Dept: SPEECH THERAPY | Facility: REHABILITATION | Age: 73
End: 2023-08-15
Payer: COMMERCIAL

## 2023-08-15 DIAGNOSIS — R49.0 DYSPHONIA: Primary | ICD-10-CM

## 2023-08-15 PROCEDURE — 92507 TX SP LANG VOICE COMM INDIV: CPT | Mod: GN | Performed by: SPEECH-LANGUAGE PATHOLOGIST

## 2023-08-16 ENCOUNTER — THERAPY VISIT (OUTPATIENT)
Dept: PHYSICAL THERAPY | Facility: REHABILITATION | Age: 73
End: 2023-08-16
Payer: COMMERCIAL

## 2023-08-16 DIAGNOSIS — M25.60 RANGE OF MOTION DEFICIT: ICD-10-CM

## 2023-08-16 DIAGNOSIS — M54.2 NECK PAIN: Primary | ICD-10-CM

## 2023-08-16 PROCEDURE — 97110 THERAPEUTIC EXERCISES: CPT | Mod: GP | Performed by: PHYSICAL THERAPIST

## 2023-08-16 PROCEDURE — 97140 MANUAL THERAPY 1/> REGIONS: CPT | Mod: GP | Performed by: PHYSICAL THERAPIST

## 2023-08-17 ENCOUNTER — DOCUMENTATION ONLY (OUTPATIENT)
Dept: OTOLARYNGOLOGY | Facility: CLINIC | Age: 73
End: 2023-08-17
Payer: COMMERCIAL

## 2023-08-17 NOTE — PROGRESS NOTES
Video Esophagram Review Rounds  Imaging Review of MBSS conducted with attending physician Christina Ang and reviewed/discussed with SLP Sade Blair, Kalli Oneal, Liliya Oleary, and/or Tiny Calderon    Relevant Background:       MBSS Date: 8/8/23    Findings:  Pharyngeal Weakness:No  Epiglottic dysfunction: No  Penetration: No  Aspiration: No  UES dysfunction: No  Details: safe swallow now, disk in place      Recommendations:  Diet:current

## 2023-08-22 ENCOUNTER — TELEPHONE (OUTPATIENT)
Dept: SPEECH THERAPY | Facility: CLINIC | Age: 73
End: 2023-08-22
Payer: COMMERCIAL

## 2023-08-22 NOTE — TELEPHONE ENCOUNTER
Called patient and LVM. Informed patient we reviewed swallow study at swallow rounds and confirmed per Dr. Ang there is no need for follow up with her. Encouraged pt to reach out if he develops any new or worsening trouble swallowing, at which point our team would be happy to see him again. Provided callback number for writer if patient had any questions/concerns.    JULIEN Canales MA (music), CCC-SLP   Speech Language Pathologist  [place] Trained Vocologist   [facility] [place]  Dept. of Otolaryngology  Department of Rehabilitation Services  [address] [place] [id number]  Email: [email or web address]   Phone: [phone]  Pronouns: she/her/hers

## 2023-08-23 ENCOUNTER — THERAPY VISIT (OUTPATIENT)
Dept: PHYSICAL THERAPY | Facility: REHABILITATION | Age: 73
End: 2023-08-23
Payer: COMMERCIAL

## 2023-08-23 DIAGNOSIS — M25.60 RANGE OF MOTION DEFICIT: ICD-10-CM

## 2023-08-23 DIAGNOSIS — M54.2 NECK PAIN: Primary | ICD-10-CM

## 2023-08-23 PROCEDURE — 97110 THERAPEUTIC EXERCISES: CPT | Mod: GP

## 2023-08-29 ENCOUNTER — THERAPY VISIT (OUTPATIENT)
Dept: SPEECH THERAPY | Facility: REHABILITATION | Age: 73
End: 2023-08-29
Payer: COMMERCIAL

## 2023-08-29 DIAGNOSIS — R49.0 DYSPHONIA: Primary | ICD-10-CM

## 2023-08-29 DIAGNOSIS — R47.1 DYSARTHRIA: ICD-10-CM

## 2023-08-29 PROCEDURE — 92507 TX SP LANG VOICE COMM INDIV: CPT | Mod: GN | Performed by: SPEECH-LANGUAGE PATHOLOGIST

## 2023-08-30 ENCOUNTER — THERAPY VISIT (OUTPATIENT)
Dept: PHYSICAL THERAPY | Facility: REHABILITATION | Age: 73
End: 2023-08-30
Payer: COMMERCIAL

## 2023-08-30 DIAGNOSIS — M54.2 NECK PAIN: Primary | ICD-10-CM

## 2023-08-30 DIAGNOSIS — M25.60 RANGE OF MOTION DEFICIT: ICD-10-CM

## 2023-08-30 PROCEDURE — 97110 THERAPEUTIC EXERCISES: CPT | Mod: GP

## 2023-09-06 ENCOUNTER — THERAPY VISIT (OUTPATIENT)
Dept: PHYSICAL THERAPY | Facility: REHABILITATION | Age: 73
End: 2023-09-06
Payer: COMMERCIAL

## 2023-09-06 DIAGNOSIS — M54.2 NECK PAIN: Primary | ICD-10-CM

## 2023-09-06 DIAGNOSIS — M25.60 RANGE OF MOTION DEFICIT: ICD-10-CM

## 2023-09-06 PROCEDURE — 97110 THERAPEUTIC EXERCISES: CPT | Mod: GP

## 2023-09-09 NOTE — PROGRESS NOTES
DISCHARGE  Reason for Discharge: No need for OT follow up    Discharge Plan: Patient will follow up if needed    Referring Provider:        07/13/23 0500   Appointment Info   Treating Provider Vandana Hernandez OTR/L   Visits Used 1   Medical Diagnosis s/p cervical spine fusion   OT Tx Diagnosis decreased ADL and IADL function   Precautions/Limitations no raising arms overhead and no lifting over 10# for 3 months   Progress Note/Certification   Onset of Illness/Injury or Date of Surgery 06/21/23   Therapy Frequency once a week   Predicted Duration 12 weeks   Progress Note Due Date 09/11/23   OT Goal 1   Goal Identifier AE   Goal Description Patient will be able to charles and doff knee high compression socks independently with AE   Target Date 08/12/23   Self Care/Home Management   Self-Care/Home Mgmt/ADL, Compensatory, Meal Prep Minutes (29035) 10 Minutes   Self Care 1 ADL and IADL's   Self Care 1 - Details Patient instructed on use of sock aid and dressing stick to assist with donning and doffing knee high compression socks. Patient does not want to use a small bed rail and does not feel he needs a smaller tub bench(the one he has does not fit). he has a grab bar for edge of bathtub   Skilled Intervention AE and compensatory strategies for ADL and IADL's   Eval/Assessments   OT Eval, Low Complexity Minutes (64227) 30   Plan   Plan for next session No need for OT follow up at this time.   Total Session Time   Timed Code Treatment Minutes 10   Total Treatment Time (sum of timed and untimed services) 40

## 2023-09-13 ENCOUNTER — THERAPY VISIT (OUTPATIENT)
Dept: PHYSICAL THERAPY | Facility: REHABILITATION | Age: 73
End: 2023-09-13
Payer: COMMERCIAL

## 2023-09-13 DIAGNOSIS — M25.60 RANGE OF MOTION DEFICIT: ICD-10-CM

## 2023-09-13 DIAGNOSIS — M54.2 NECK PAIN: Primary | ICD-10-CM

## 2023-09-13 PROCEDURE — 97140 MANUAL THERAPY 1/> REGIONS: CPT | Mod: GP

## 2023-09-13 PROCEDURE — 97110 THERAPEUTIC EXERCISES: CPT | Mod: GP

## 2023-09-20 ENCOUNTER — THERAPY VISIT (OUTPATIENT)
Dept: PHYSICAL THERAPY | Facility: REHABILITATION | Age: 73
End: 2023-09-20
Payer: COMMERCIAL

## 2023-09-20 DIAGNOSIS — M25.60 RANGE OF MOTION DEFICIT: ICD-10-CM

## 2023-09-20 DIAGNOSIS — M54.2 NECK PAIN: Primary | ICD-10-CM

## 2023-09-20 PROCEDURE — 97110 THERAPEUTIC EXERCISES: CPT | Mod: GP

## 2023-09-26 ENCOUNTER — OFFICE VISIT (OUTPATIENT)
Dept: NEUROSURGERY | Facility: CLINIC | Age: 73
End: 2023-09-26
Payer: COMMERCIAL

## 2023-09-26 ENCOUNTER — ANCILLARY PROCEDURE (OUTPATIENT)
Dept: GENERAL RADIOLOGY | Facility: CLINIC | Age: 73
End: 2023-09-26
Attending: PHYSICIAN ASSISTANT
Payer: COMMERCIAL

## 2023-09-26 VITALS
DIASTOLIC BLOOD PRESSURE: 92 MMHG | SYSTOLIC BLOOD PRESSURE: 131 MMHG | HEART RATE: 63 BPM | HEIGHT: 71 IN | OXYGEN SATURATION: 97 % | WEIGHT: 171 LBS | BODY MASS INDEX: 23.94 KG/M2

## 2023-09-26 DIAGNOSIS — Z98.1 S/P CERVICAL SPINAL FUSION: ICD-10-CM

## 2023-09-26 DIAGNOSIS — Z98.1 S/P CERVICAL SPINAL FUSION: Primary | ICD-10-CM

## 2023-09-26 PROCEDURE — 99214 OFFICE O/P EST MOD 30 MIN: CPT | Mod: GC | Performed by: NEUROLOGICAL SURGERY

## 2023-09-26 PROCEDURE — 72040 X-RAY EXAM NECK SPINE 2-3 VW: CPT | Mod: GC | Performed by: RADIOLOGY

## 2023-09-26 ASSESSMENT — PAIN SCALES - GENERAL: PAINLEVEL: MILD PAIN (3)

## 2023-09-26 NOTE — LETTER
9/26/2023       RE: Bird Kaur  [address] [place] [address]  [place] [address]     Dear Colleague,    Thank you for referring your patient, Bird Kaur, to the [facility] [place] [facility]. Please see a copy of my visit note below.    9/26/2023  Neurosurgery Clinic Visit    History of present illness:  Bird Kaur is a 72 year old male s/p C3-4 ACDF with osteophytectomy in June presenting today for his 3-month follow-up. He has been doing great in terms of his swallowing function. His current issues are mostly related to his tremors and slow movement, which he is going to see a neurologist for. Follow-up X-rays are completed which shows stable appearance of the construct.       Physical exam:   There were no vitals taken for this visit.  General: Awake and alert and in no acute distress.  Pulm: Breathing comfortably on room air  Motor: Good muscle bulk throughout. 5 out of 5 strength in bilateral upper and lower extremities   Sensation: Sensation grossly intact to light touch in all extremities.   Reflexes: 2+ reflexes bilateral biceps, brachioradialis, and patellar tendons. Holman's reflex negative. Bilateral clonus negative.     Assessment:  #Dysphagia s/p C3-4 ACDF with osteophytectomy  72 year old male s/p C3-4 ACDF with osteophytectomy in June presenting today for his 3-month follow-up. His swallowing function has improved significantly since surgery and passed another swallow test in August. Remains full strength in all 4 extremities without any sensory deficits. Has resting tremors which he is planning to follow with neurology for. Follow-up X-rays show stable construct from his ACDF.     Plan:  - Follow-up in clinic in 9 months (one year post surgery) with repeat C-spine X-rays    Patient seen and discussed with Dr. Michelle Genao MD.  Approximately 30 minutes were spent in chart and image review,  evaluation of the patient and assessment of next steps.    Lai Goncalves MD on 2023 at 10:09 AM  PGY-1, Department of Neurosurgery  [facility]    REVIEWED ASSOCIATED CLINICAL DATA AND HISTORY FOUND IN THE MEDICAL RECORD:  Past Medical History:   Past Medical History:   Diagnosis Date    Benign essential hypertension     Chronic atrial fibrillation (H)     Dysphagia     Heart failure with preserved ejection fraction, NYHA class I (H)     Obesity     CHICA (obstructive sleep apnea)        Surgical History:   Past Surgical History:   Procedure Laterality Date    APPENDECTOMY      in his 20s    cervcial spine      surgery on c-spine, not a fusion    FUSION CERVICAL ANTERIOR ONE LEVEL N/A 2023    Procedure: Cervical 3/4 osteophytectomy with anterior discectomy and fusion;  Surgeon: Michelle Genao MD;  Location: [facility] OR    HERNIA REPAIR      LAPAROSCOPIC UNROOFING LIVER CYST  2016    MOHS MICROGRAPHIC PROCEDURE      TONSILLECTOMY & ADENOIDECTOMY      childhood    TOTAL KNEE ARTHROPLASTY Left        Social history:   Social History     Tobacco Use    Smoking status: Former     Years: 5.00     Types: Cigarettes     Quit date:      Years since quittin.7     Passive exposure: Past    Smokeless tobacco: Never    Tobacco comments:     Quit smoking in  . Smoked 5 years in the . Usually one cigarette per day    Substance Use Topics    Alcohol use: Not Currently    Drug use: Never       Family history:   Family History   Problem Relation Age of Onset    Anesthesia Reaction No family hx of     Venous thrombosis No family hx of        Medications:  Current Outpatient Medications   Medication    carboxymethylcellulose PF (REFRESH PLUS) 0.5 % ophthalmic solution    dofetilide (TIKOSYN) 500 MCG capsule    empagliflozin (JARDIANCE) 25 MG TABS tablet    finasteride (PROSCAR) 5 MG tablet    fluticasone (FLONASE) 50 MCG/ACT nasal spray    furosemide (LASIX) 40 MG tablet     lovastatin (MEVACOR) 20 MG tablet    methocarbamol (ROBAXIN) 500 MG tablet    metoprolol tartrate (LOPRESSOR) 100 MG tablet    montelukast (SINGULAIR) 10 MG tablet    oxyCODONE (ROXICODONE) 5 MG tablet    polyethylene glycol (MIRALAX) 17 GM/Dose powder    potassium chloride ER (K-TAB) 20 MEQ CR tablet    sacubitril-valsartan (ENTRESTO)  MG per tablet    senna-docusate (SENOKOT-S/PERICOLACE) 8.6-50 MG tablet    trospium (SANCTURA) 20 MG tablet    vitamin B-12 (CYANOCOBALAMIN) 100 MCG tablet    Vitamin D3 (CHOLECALCIFEROL) 25 mcg (1000 units) tablet     No current facility-administered medications for this visit.     Allergies:  Allergies   Allergen Reactions    Amoxicillin     Flomax [Tamsulosin]     Lisinopril Unknown    Molds & Smuts Unknown    Morphine     Verapamil    I have seen this patient with the resident and formulated a plan and agree with this note.  AMP      Again, thank you for allowing me to participate in the care of your patient.      Sincerely,    Michelle Genao MD

## 2023-09-26 NOTE — PROGRESS NOTES
9/26/2023  Neurosurgery Clinic Visit    History of present illness:  Bird Kaur is a 72 year old male s/p C3-4 ACDF with osteophytectomy in June presenting today for his 3-month follow-up. He has been doing great in terms of his swallowing function. His current issues are mostly related to his tremors and slow movement, which he is going to see a neurologist for. Follow-up X-rays are completed which shows stable appearance of the construct.       Physical exam:   There were no vitals taken for this visit.  General: Awake and alert and in no acute distress.  Pulm: Breathing comfortably on room air  Motor: Good muscle bulk throughout. 5 out of 5 strength in bilateral upper and lower extremities   Sensation: Sensation grossly intact to light touch in all extremities.   Reflexes: 2+ reflexes bilateral biceps, brachioradialis, and patellar tendons. Holman's reflex negative. Bilateral clonus negative.     Assessment:  #Dysphagia s/p C3-4 ACDF with osteophytectomy  72 year old male s/p C3-4 ACDF with osteophytectomy in June presenting today for his 3-month follow-up. His swallowing function has improved significantly since surgery and passed another swallow test in August. Remains full strength in all 4 extremities without any sensory deficits. Has resting tremors which he is planning to follow with neurology for. Follow-up X-rays show stable construct from his ACDF.     Plan:  - Follow-up in clinic in 9 months (one year post surgery) with repeat C-spine X-rays    Patient seen and discussed with Dr. Michelle Genao MD.  Approximately 30 minutes were spent in chart and image review, evaluation of the patient and assessment of next steps.    Lai Goncalves MD on 9/26/2023 at 10:09 AM  PGY-1, Department of Neurosurgery  [facility]    REVIEWED ASSOCIATED CLINICAL DATA AND HISTORY FOUND IN THE MEDICAL RECORD:  Past Medical History:   Past Medical History:   Diagnosis Date    Benign  essential hypertension     Chronic atrial fibrillation (H)     Dysphagia     Heart failure with preserved ejection fraction, NYHA class I (H)     Obesity     CHICA (obstructive sleep apnea)        Surgical History:   Past Surgical History:   Procedure Laterality Date    APPENDECTOMY      in his 20s    cervcial spine      surgery on c-spine, not a fusion    FUSION CERVICAL ANTERIOR ONE LEVEL N/A 2023    Procedure: Cervical 3/4 osteophytectomy with anterior discectomy and fusion;  Surgeon: Michelle Genao MD;  Location: [facility] OR    HERNIA REPAIR      LAPAROSCOPIC UNROOFING LIVER CYST  2016    MOHS MICROGRAPHIC PROCEDURE      TONSILLECTOMY & ADENOIDECTOMY      childhood    TOTAL KNEE ARTHROPLASTY Left        Social history:   Social History     Tobacco Use    Smoking status: Former     Years: 5.00     Types: Cigarettes     Quit date:      Years since quittin.7     Passive exposure: Past    Smokeless tobacco: Never    Tobacco comments:     Quit smoking in  . Smoked 5 years in the [place]. Usually one cigarette per day    Substance Use Topics    Alcohol use: Not Currently    Drug use: Never       Family history:   Family History   Problem Relation Age of Onset    Anesthesia Reaction No family hx of     Venous thrombosis No family hx of        Medications:  Current Outpatient Medications   Medication    carboxymethylcellulose PF (REFRESH PLUS) 0.5 % ophthalmic solution    dofetilide (TIKOSYN) 500 MCG capsule    empagliflozin (JARDIANCE) 25 MG TABS tablet    finasteride (PROSCAR) 5 MG tablet    fluticasone (FLONASE) 50 MCG/ACT nasal spray    furosemide (LASIX) 40 MG tablet    lovastatin (MEVACOR) 20 MG tablet    methocarbamol (ROBAXIN) 500 MG tablet    metoprolol tartrate (LOPRESSOR) 100 MG tablet    montelukast (SINGULAIR) 10 MG tablet    oxyCODONE (ROXICODONE) 5 MG tablet    polyethylene glycol (MIRALAX) 17 GM/Dose powder    potassium chloride ER (K-TAB) 20 MEQ CR tablet    sacubitril-valsartan  (ENTRESTO)  MG per tablet    senna-docusate (SENOKOT-S/PERICOLACE) 8.6-50 MG tablet    trospium (SANCTURA) 20 MG tablet    vitamin B-12 (CYANOCOBALAMIN) 100 MCG tablet    Vitamin D3 (CHOLECALCIFEROL) 25 mcg (1000 units) tablet     No current facility-administered medications for this visit.       Allergies:     Allergies   Allergen Reactions    Amoxicillin     Flomax [Tamsulosin]     Lisinopril Unknown    Molds & Smuts Unknown    Morphine     Verapamil    I have seen this patient with the resident and formulated a plan and agree with this note.  AMP

## 2023-09-27 ENCOUNTER — THERAPY VISIT (OUTPATIENT)
Dept: PHYSICAL THERAPY | Facility: REHABILITATION | Age: 73
End: 2023-09-27
Payer: COMMERCIAL

## 2023-09-27 DIAGNOSIS — M54.2 NECK PAIN: Primary | ICD-10-CM

## 2023-09-27 DIAGNOSIS — M25.60 RANGE OF MOTION DEFICIT: ICD-10-CM

## 2023-09-27 PROCEDURE — 97110 THERAPEUTIC EXERCISES: CPT | Mod: GP

## 2023-09-27 NOTE — PROGRESS NOTES
DISCHARGE  Reason for Discharge: Patient has met or approached all goals and now chooses to discontinue therapy.    Equipment Issued: Elastic bands.    Discharge Plan: Patient to continue home program.    Referring Provider:  Felipa Thurman           23 0500   Appointment Info   Signing clinician's name / credentials Suman Nielsen PT and Elayne Lynch SPT   Visits Used 12   Medical Diagnosis Back pain, neck pain, dysphagia, osteophyte   PT Tx Diagnosis Impaired ROM, impaired functional mobility, pain   Precautions/Limitations Lifting restriction increased to 20 pounds   Progress Note/Certification   Onset of illness/injury or Date of Surgery 23  (date of surgery)   Therapy Frequency 1-2x/week   Predicted Duration 12 weeks   Progress Note Due Date 23   Progress Note Completed Date 23   Supervision   Student Supervision Direct supervision provided;Direct Patient Contact Provided;Therapy services provided with the co-signing licensed therapist guiding and directing the services, and providing the skilled judgement and assessment throughout the session   GOALS   PT Goals 2;3   PT Goal 1   Goal Identifier ROM   Goal Description Bird will demonstrate cervical rotation to at least 45 degrees in each direction without compensations in order to work towards returning to driving.   Goal Progress Flexion: 20 degrees. Extension: 10 degrees. Right rotation: 25 degrees. Left rotation: 35 degrees. Right side-bending: to neutral (starting from 1-3 degrees of left side-bending). Left side-bendin degrees (starting from 1-3 degrees of left side-bending).   Target Date 23   PT Goal 2   Goal Identifier Pain   Goal Description Bird will report 0-2/10 pain in both neck and RUE over a 2 week time period in order to improve QOL.   Goal Progress 4/10   Target Date 23   PT Goal 3   Goal Identifier RUE ROM   Goal Description Bird will raise RUE overhead to at least 120 degrees and  symmetrical with LUE with no pain reported in order to reach OH for objects.   Goal Progress Abduction: 110 degrees with scaption compensation (symmetrical). Flexion: 110 degrees with scaption compensation (left: 120 degrees).   Target Date 09/30/23   Subjective Report   Subjective Report Bird said that he had some trouble with yard work this past week due to continued pain and stiffness, though his symptoms have improved overall since the start of treatment. He has been able to drive and turn  his head to see his mirrors with no trouble. He met with his neurosurgeon yesterday and reports that everything was good.   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)   Objective Measure 2   Objective Measure TUG   Details 11.76 seconds without AD and no instability   Objective Measure 3   Objective Measure 30 Second Sit-to-stand   Details 9 reps without upper extremity assist   Treatment Interventions (PT)   Interventions Therapeutic Procedure/Exercise   Therapeutic Procedure/Exercise   Therapeutic Procedures: strength, endurance, ROM, flexibillity minutes (60900) 23   Ther Proc 1 Nustep + subjective report + discussion regarding plan of care   Ther Proc 1 - Details 5 minutes (level 5)   Ther Proc 2 Education   Ther Proc 2 - Details Instructions for independent management of HEP   Ther Proc 3 Cervical rotation and flexion/extension andsidebending (limited to reduce pain) AROM   Ther Proc 3 - Details 1 x 10 each bilateral   Ther Proc 4 Row   Ther Proc 4 - Details 2 x 15 x 5 second holds (green band) (cueing for scapular retraction)   Ther Proc 5 Bilateral shouder extension   Ther Proc 5 - Details 2 x 15 x 5 second holds (green band)   Skilled Intervention Exercises to improve core, neck, and shoulder mobility and strength.   Patient Response/Progress Pro verbalized understanding of education and tolerated exercises well with some cueing to  "limit cervical side bending AROM due to pain.   Education   Learner/Method Patient;Significant Other   Plan   Home program See PTRx.   Plan for next session Discharge to [facility].   Comments   Comments Assessment: Bird reports some difficulty with yard work this past week due to neck pain and stiffness, although his symptoms have improved overall since the start of treatment. He had a follow-up with his surgeon who said that \"everything looks good.\" Since starting treatment, he has improved his 30 second sit to stand and TUG scores, which demonstrated improved functional mobility and decreased fall risk. He also demonstrates notably improved (though still significantly limited) neck and shoulder AROM and has been able to drive and perform all of his required/desired activities, though he still experiences some pain with certain tasks. Overall since surgery his pain has been gradually decreasing as well. He does still have increased pain with increased activity, but at this time he verbalizes a desire manage his symptoms on his own moving forward. He is now appropriate for discharge to a home exercise/maintenance program.   Total Session Time   Timed Code Treatment Minutes 23   Total Treatment Time (sum of timed and untimed services) 23       "

## 2023-11-01 ENCOUNTER — MYC MEDICAL ADVICE (OUTPATIENT)
Dept: NEUROSURGERY | Facility: CLINIC | Age: 73
End: 2023-11-01

## 2023-11-13 NOTE — PROGRESS NOTES
DISCHARGE  Reason for Discharge: Patient has met all goals.  Patient chooses to discontinue therapy.    Equipment Issued: NA    Discharge Plan: Patient to continue home program.    Referring Provider:  Felipa Thurman       08/29/23 0500   Appointment Info   Treating Provider Olamide Genao M.S. CCC-SLP   Total/Authorized Visits 4   Visits Used 2/4   Medical Diagnosis Dysarthria, Dysphagia R13.10   SLP Tx Diagnosis Dysarthria, Dysphonia   Progress Note/Certification   Onset Of Illness/injury Or Date Of Surgery 06/26/23   Therapy Frequency 1x per month   Predicted Duration 2 months   Subjective Report   Subjective Report Patient arrived to therapy in good spirits.  He felt that he was doing  similar speech wise .  He stated that  when I m thinking about it the clarity is there  but that if he is not careful  I start mumbling and people ask me what I m saying.   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   SLP Goals   SLP Goals 1;2;3   SLP Goal 1   Goal Identifier 1. Speech Strategies   Goal Description Patient will learn and demonstrate use of speech intelligibility strategies (i.e., increased volume, slow rate, over-exaggeration) during 5 minutes of structured conversation FÁTIMA by end of POC.   Rationale To maximize functional communication within the home or community   Goal Progress Goal met   Target Date 09/03/23   Date Met 08/29/23   SLP Goal 2   Goal Identifier 2. Conversation   Goal Description Patient will implement compensatory strategy of projecting voice during conversation to improve vocal loudness while maintaining a minimum of 73 dB on at least 80% of time maintained across 10 minutes of structured conversation FÁTIMA by end of POC.   Rationale To maximize functional communication within the home or community   Goal Progress Goal progressing- met with 100% accuracy across x1 session.  Plan to follow-up in x1 month to monitor maintenance and adjust HEP as  appropriate.   Target Date 11/03/23   SLP Goal 3   Goal Identifier 3. Home Program   Goal Description Patient will verbalize understanding of home program for speech/voice.   Rationale To maximize functional communication within the home or community   Goal Progress Goal progressing, follow-up in x1 month to monitor adherence and maintenance and adjust as appopriate.   Target Date 11/03/23   Treatment Interventions (SLP)   Treatment Interventions Treatment Speech/Lang/Voice   Treatment Speech/Lang/Voice   Treatment of Speech, Language, Voice Communication&/or Auditory Processing (16845) 45 Minutes   Speech/Lang/Voice 1 - Details Trained patient in SpeakOut! Lesson 19. Warm ups: Patient achieves desired loudness level 100% of opportunities FÁTIMA. Sustained /a/: Trial 1: 25 seconds at 98 dB fading to 92 dB, Trial 2: 19 seconds at 99 dB fading to 96 dB.  Glides: Patient able to achieve desired loudness level in 100% of opportunities FÁTIMA. Numbers, 5-100, 5 sets of 4: Patient achieves desired loudness level 100% of opportunities FÁTIMA. Reading, 1-page story: Patient achieves desired loudness level 97% of opportunities given initial verbal instruction. Cognitive Task (describing locations): Patient achieves desired loudness level 100% of opportunities FÁTIMA.   Skilled Intervention Provided written and verbal information on.;Provided feedback on performance of tasks;Modeled compensatory strategies   Patient Response/Progress Patient with improved intensity, breath support, and speech clarity with Intent.   Education   Learner/Method Patient;Listening   Education Comments SpeakOut! vocal exercises   Plan   Home program SpeakOut! Lesson 19   Updates to plan of care Continue POC, plan to follow up in x1 month to monitor maintenance.  Plan to extend plan of care additional x2 months, for therapy 1x per month (9/4/23-11/3/23).   Plan for next session vocal exercises, maintenance, home program   Total Session Time   Total Treatment  Time (sum of timed and untimed services) 45

## 2023-12-11 NOTE — TELEPHONE ENCOUNTER
[facility] Call Center    Phone Message    May a detailed message be left on voicemail: yes     Reason for Call: Other: Patient called stating he never heard back from clinic regarding the return to work letter, patient states he was logged out of [employer]. Writer will assist with re-setting his information. Patient is requesting a letter stating his employer does not have a specific form. Patient states this can be sent to [email or web address]. Please review.      Action Taken: Message routed to:  [facility] ([facility] Neurosurgery    Travel Screening: Not Applicable

## 2024-02-28 NOTE — PROGRESS NOTES
[facility]                                                                                   OUTPATIENT PHYSICAL THERAPY    PLAN OF TREATMENT FOR OUTPATIENT REHABILITATION   Patient's Last Name, First Name, Bird Bejarano YOB: 1950   Provider's Name   [facility]   Medical Record No.  [id number]     Onset Date: 06/21/23 (date of surgery)  Start of Care Date: 07/03/2023     Medical Diagnosis:  Back pain, neck pain, dysphagia, osteophyte      PT Treatment Diagnosis:  Impaired ROM, impaired functional mobility, pain Plan of Treatment  Frequency/Duration: 1 time per week/4 weeks    Certification date from 09/20/2023 to 10/18/2023        See note for plan of treatment details and functional goals     Suman Nielsen, PT                         I CERTIFY THE NEED FOR THESE SERVICES FURNISHED UNDER        THIS PLAN OF TREATMENT AND WHILE UNDER MY CARE     (Physician attestation of this document indicates review and certification of the therapy plan).              Referring Provider:  Felipa Thurman    Initial Assessment  See Epic Evaluation-                     09/20/23 0500   Appointment Info   Signing clinician's name / credentials Suman Nielsen PT   Visits Used 11   Medical Diagnosis Back pain, neck pain, dysphagia, osteophyte   PT Tx Diagnosis Impaired ROM, impaired functional mobility, pain   Precautions/Limitations Lifting restriction increased to 20 pounds   Progress Note/Certification   Onset of illness/injury or Date of Surgery 06/21/23  (date of surgery)   Therapy Frequency 1-2x/week   Predicted Duration 12 weeks   Progress Note Due Date 09/30/23   Progress Note Completed Date 08/09/23   GOALS   PT Goals 2;3   PT Goal 1   Goal Identifier ROM   Goal Description Bird will demonstrate cervical rotation to at least 45 degrees in each direction without compensations in order to work towards returning to driving.    Goal Progress Flexion: 20 degrees. Extension: 10 degrees. Right rotation: 25 degrees. Left rotation: 35 degrees. Right side-bending: to neutral (starting from 1-3 degrees of left side-bending). Left side-bendin degrees (starting from 1-3 degrees of left side-bending).   Target Date 23   PT Goal 2   Goal Identifier Pain   Goal Description Bird will report 0-2/10 pain in both neck and RUE over a 2 week time period in order to improve QOL.   Goal Progress 4/10   Target Date 23   PT Goal 3   Goal Identifier RUE ROM   Goal Description Bird will raise RUE overhead to at least 120 degrees and symmetrical with LUE with no pain reported in order to reach OH for objects.   Goal Progress Abduction: 110 degrees with scaption compensation (symmetrical). Flexion: 110 degrees with scaption compensation (left: 120 degrees).   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 He reports that he has been feeling a little better over the past couple of days, though.   Objective Measure 1   Objective Measure Worst Pain   Details Since last visit: -7/10 (by end of week teaching classes)   Objective Measure 2   Objective Measure TUG   Details 11.76 seconds without AD and no instability   Objective Measure 3   Objective Measure 30 Second Sit-to-stand   Details 9 reps without upper extremity assist   Treatment Interventions (PT)   Interventions Therapeutic Procedure/Exercise   Therapeutic Procedure/Exercise   Therapeutic Procedures: strength, endurance, ROM, flexibillity minutes (95791) 38   Ther Proc 1 Nustep + subjective report + discussion regarding plan of care   Ther Proc 1 - Details 7 minutes   Ther Proc 2 Objective measures and goal re-assessment   Ther Proc 2 - Details See goals and objective measures   Ther Proc 3 Cervical rotation and flexion/extension AROM (side-bending  still painful to right)   Ther Proc 3 - Details 1 x 10 each bilateral   Ther Proc 4 Row   Ther Proc 4 - Details 3 x 12 x 5 second holds (green band)   Ther Proc 5 Bilateral shouder extension   Ther Proc 5 - Details 3 x 12 x 5 second holds (green band)   Skilled Intervention Exercises to improve core, neck, and shoulder mobility and strength.   Patient Response/Progress Exercises were limited in intensity and focused on general mobility and gentle strengthening given recent increase in pain.   Education   Learner/Method Patient;Significant Other   Plan   Home program See PTRx.   Plan for next session Discharge to [facility].   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 Therapist and patient discussed progress toward goals and along plan of care. He demonstrates notably improved (though still significantly limited) neck AROM. Overall since surgery his pain has been gradually decreasing, though recently it has been higher due to increased activity. He is now driving and reports being able to perform all of his desired/needed activities, though with variable pain depending on the task. He reports feeling like he is about ready to discharge to a home exercise/maintenance program, but he will benefit from at least one more visit to finalize discharge planning and discuss any updates from his next surgical follow-up 9/26/23.   Total Session Time   Timed Code Treatment Minutes 38   Total Treatment Time (sum of timed and untimed services) 38

## 2024-03-15 NOTE — PROGRESS NOTES
Patient wants this message sent to Dr. Pope and not Dr. Mulligan since Dr. MUNOZ has been treating patient for BP. Physician Attestation   I agree with the information in this note.    Christina Gimenez

## 2024-04-22 ENCOUNTER — TELEPHONE (OUTPATIENT)
Dept: NEUROSURGERY | Facility: CLINIC | Age: 74
End: 2024-04-22
Payer: COMMERCIAL

## 2024-06-18 ENCOUNTER — ANCILLARY PROCEDURE (OUTPATIENT)
Dept: GENERAL RADIOLOGY | Facility: CLINIC | Age: 74
End: 2024-06-18
Attending: NEUROLOGICAL SURGERY
Payer: COMMERCIAL

## 2024-06-18 ENCOUNTER — OFFICE VISIT (OUTPATIENT)
Dept: NEUROSURGERY | Facility: CLINIC | Age: 74
End: 2024-06-18
Payer: COMMERCIAL

## 2024-06-18 VITALS
HEART RATE: 62 BPM | BODY MASS INDEX: 38.08 KG/M2 | RESPIRATION RATE: 16 BRPM | HEIGHT: 71 IN | OXYGEN SATURATION: 95 % | SYSTOLIC BLOOD PRESSURE: 106 MMHG | DIASTOLIC BLOOD PRESSURE: 71 MMHG | WEIGHT: 272 LBS

## 2024-06-18 DIAGNOSIS — Z98.1 S/P CERVICAL SPINAL FUSION: ICD-10-CM

## 2024-06-18 DIAGNOSIS — Z98.1 S/P CERVICAL SPINAL FUSION: Primary | ICD-10-CM

## 2024-06-18 PROCEDURE — 72040 X-RAY EXAM NECK SPINE 2-3 VW: CPT | Mod: GC | Performed by: RADIOLOGY

## 2024-06-18 PROCEDURE — 99213 OFFICE O/P EST LOW 20 MIN: CPT | Mod: GC | Performed by: NEUROLOGICAL SURGERY

## 2024-06-18 RX ORDER — DOCUSATE SODIUM 100 MG/1
100 CAPSULE, LIQUID FILLED ORAL
COMMUNITY
Start: 2023-07-24

## 2024-06-18 RX ORDER — SPIRONOLACTONE 25 MG/1
25 TABLET ORAL DAILY
COMMUNITY

## 2024-06-18 RX ORDER — CARBIDOPA AND LEVODOPA 25; 100 MG/1; MG/1
1 TABLET ORAL 3 TIMES DAILY
COMMUNITY

## 2024-06-18 ASSESSMENT — PAIN SCALES - GENERAL: PAINLEVEL: MILD PAIN (3)

## 2024-06-18 NOTE — PATIENT INSTRUCTIONS
No further follow-up with Neurosurgery is needed.      Thanks you for choosing [facility] for your surgical needs.  We wish you the best in the future.

## 2024-06-18 NOTE — LETTER
6/18/2024       RE: Bird Kaur  [address]  [place] [address]     Dear Colleague,    Thank you for referring your patient, Bird Kaur, to the [facility]UROS[facility] [place] at [facility]. Please see a copy of my visit note below.        Neurosurgery Clinic Note    Chief Complaint: post-op visit    DATE OF VISIT: 6/18/2024    Procedure Date: 06/21/2023  PREOPERATIVE DIAGNOSIS:  Oropharyngeal dysphagia.  POSTOPERATIVE DIAGNOSIS:  Oropharyngeal dysphagia.     PROCEDURE PERFORMED:    1. Cervical 3-4 osteophytectomy   2. Anterior Cervical 3-4 diskectomy and fusion  3. Use of intraoperative microscope  4. Use of intraoperative fluoroscopy  5. Use of neuromonitoring     SURGEON:    Panel 1.  Esther Quintanilla MD and Selene Tom MD     Panel 2.   Michelle Genao MD and Juanis Lorenzo MD     ANESTHESIA:  General.     ESTIMATED BLOOD LOSS:  20 mL.     DRAINS:  None.     SPECIMENS:  None.     FINDINGS:  Completed osteophytectomy, confirmed level with x-ray.     COMPLICATIONS:  None.     IMPLANTS:    1.  Endoskeleton TCS interbody system, medium, serial #[id number], [employer]tronic, lot number [id number], 6 mm, 16 mm x 14 mm.  2.  Plate type, Titan TCS plate, serial #[id number], lot number [id number].  3.  Screws, VN 14 mm, 3.55 x 14 mm, endoskeleton TCS spine CRV interbody, lot #[id number], Medtronic x2.    4.  1 mL Jaja Putty.     INDICATIONS FOR PROCEDURE:  The patient is a 72-year-old male with a 2 year history of progressive dysphagia that has been gradually worsening to both solids and liquids for which he underwent extensive workup with ENT and speech and swallow clinic, at which point he was identified to have a C3-C4 large osteophyte compressing the esophagus causing epiglottis inversion.  He was referred to the neurosurgery clinic for consultation for osteophytectomy.  Due to the complexity of the compression of the  esophagus, we requested that our ENT colleagues perform the approach for safety while dissecting the esophagus off the spine.  The risks and benefits were discussed with the patient, and he provided consent for the above-mentioned procedure.    HPI: Bird Kaur is a pleasant 72 year old male who is s/p C3/4 osteophytectomy and ACDF by Dr. Genao on 6/21/23 for oropharyngeal dysphagia.     7/5/23 visit: patient is 2 weeks out from surgery.  He had improved ability to swallow right after the procedure.  He said it was rough for about 4-5 days with swallowing things, but he is now eating normal foods.  He still has a hoarse voice.  He is unable to get a swallow evaluation until August.  He is only taking Tylenol for pain because he did not feel good on the oxycodone.  He endorses pain more on the right side of his neck and has issues with turning that way.  He has no other new complaints.  He has already started PT at [facility] and they are having him work on neck rotation, but only up to the point of pain.  He is very limited on rotation at this point.       8/7/23 Visit: Has a muscle on the right side of his neck that is tight and limits mobility and bothers him during sleep.  Going to PT.  Swallowing is improving.  He notes worsening tremors throughout his body. Will be discussing with neurology and PCP.  Patient and wife very appreciate of the service they have received at hospital and in clinic.      Currently, patient is about 1 year out from surgery. His chief concern relates to his Parkinson's, for which he recently received a TAMERA scan, associated with tremulousness and is currently following with Dr. Benavides and Melani with Neurology. He reports no dysphagia associated with the surgery. Working with speech therapy with regards to dysphonia.    Overall feels improved with regards to neck pain with surgery and physical therapy, except for some stiffness and pain when turning to the RIGHT localized to his  "lateral neck, most often experienced while driving. No radiating signs, patient feels it may localize to a neck joint. Patient endorses this was likely present BEFORE the surgery.     Endorses generalized weakness, sometimes legs give out and difficulty catching self. Inconsistent. Denies new numbness or tingling. Reports occasional urinary dribbling. Endorses constipation that had required disimpaction earlier in the year, denies zeynep bladder incontinence. Denies saddle anesthesia.       Physical Exam   /71 (BP Location: Right arm, Patient Position: Sitting)   Pulse 62   Resp 16   Ht 1.803 m (5' 11\")   Wt 123.4 kg (272 lb)   SpO2 95%   BMI 37.94 kg/m        Constitutional: Alert, no acute distress.  [place]:  ambulates with assist of a cane when knee aching; can walk around grocery store fine, around block with dog.    Neurological:  CN2-12 intact (uses hearing aids bilaterally)    Strength (L/R)  5/5 Deltoid  5/5 Bicep  5/5 Wrist Extensor  5/5 Tricep  5/5 Finger Flexion  5/5 Finger Abduction  5/5 Handgrip    5/5 Hip Flexion  5/5 Knee Extension  5/5 Ankle Dorsiflexion  5/5 Extensor Hallucis Longus  5/5 Plantar Flexion  5/5 Foot Eversion  5/5 Foot Inversion    Reflexes (L/R)  2+/2+ Brachioradialis  2+/2+ Patellar    Mild tremulousness in tongue, bilateral hands, shaking in feet; stiff slowed gait    Sensation: intact to light throughout except in LEFT ulnar distribution since surgery with Dr. Sidhu ~2018    Incision:  well healed - barely visible      IMAGING:    Imaging independently reviewed and discussed with Dr. Genao. Hardware stable with good alignment and no evidence of migration.    Narrative & Impression   XR CERVICAL SPINE 2/3 VIEWS  6/18/2024 10:21 AM       HISTORY: 1 yr s/p C 3-4 osteophytectomy, C3-4 ACDF; S/P cervical  spinal fusion     COMPARISON: Radiographs 9/26/2023, 3/3/2023     FINDINGS: Postsurgical changes C3-4 ACDF and osteophytectomy. There is  evidence of increased ankylosis " particularly anteriorly compared to  9/26/2023. There also appears to be regrowth of an anterior inferior  endplate osteophyte of C3. Stable alignment. There are bridging  anterior osteophytes diffusely in the cervical spine and visualized  upper thoracic spine. Multilevel facet arthropathy. No prevertebral  soft tissue swelling. Lung apices are relatively clear.                                                                      IMPRESSION:  1.  Increased ankylosis at C3-4 status post ACDF and osteophytectomy.  There has been regrowth of an anterior osteophyte off the inferior  endplate of C3 since 9/26/2023.  2. 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      I have personally reviewed the examination and initial interpretation  and I agree with the findings.     BRAD BERNAL MD         SYSTEM ID:  [id number]     ASSESSMENT:  Bird Kaur is a 72 year old male who is s/p C3/4 osteophytectomy and ACDF by Dr. Genao on 6/21/23 for oropharyngeal dysphagia. Patient is now approximately 1 year out from surgery and doing well.        PLAN:  - Follow-up as needed. No additional scheduled follow-up required.   - No activity restrictions    Patient is in agreement with this plan and states no further questions.        KARY ACHARYA MD  PGY1 Neurosurgery Intern            Again, thank you for allowing me to participate in the care of your patient.      Sincerely,    Michelle Genao MD

## 2024-06-18 NOTE — PROGRESS NOTES
Neurosurgery Clinic Note    Chief Complaint: post-op visit    DATE OF VISIT: 6/18/2024    Procedure Date: 06/21/2023  PREOPERATIVE DIAGNOSIS:  Oropharyngeal dysphagia.  POSTOPERATIVE DIAGNOSIS:  Oropharyngeal dysphagia.     PROCEDURE PERFORMED:    1. Cervical 3-4 osteophytectomy   2. Anterior Cervical 3-4 diskectomy and fusion  3. Use of intraoperative microscope  4. Use of intraoperative fluoroscopy  5. Use of neuromonitoring     SURGEON:    Panel 1.  Esther Quintanilla MD and Selene Tom MD     Panel 2.   Michelle Genao MD and Juanis Lorenzo MD     ANESTHESIA:  General.     ESTIMATED BLOOD LOSS:  20 mL.     DRAINS:  None.     SPECIMENS:  None.     FINDINGS:  Completed osteophytectomy, confirmed level with x-ray.     COMPLICATIONS:  None.     IMPLANTS:    1.  Endoskeleton TCS interbody system, medium, serial #[id number], [employer]tronic, lot number [id number], 6 mm, 16 mm x 14 mm.  2.  Plate type, Titan TCS plate, serial #[id number], lot number [id number].  3.  Screws, VN 14 mm, 3.55 x 14 mm, endoskeleton TCS spine CRV interbody, lot #[id number], [employer]onic x2.    4.  1 mL [place] Putty.     INDICATIONS FOR PROCEDURE:  The patient is a 72-year-old male with a 2 year history of progressive dysphagia that has been gradually worsening to both solids and liquids for which he underwent extensive workup with ENT and speech and swallow clinic, at which point he was identified to have a C3-C4 large osteophyte compressing the esophagus causing epiglottis inversion.  He was referred to the neurosurgery clinic for consultation for osteophytectomy.  Due to the complexity of the compression of the esophagus, we requested that our ENT colleagues perform the approach for safety while dissecting the esophagus off the spine.  The risks and benefits were discussed with the patient, and he provided consent for the above-mentioned procedure.    HPI: Bird Kaur is a pleasant 72 year old male who is s/p C3/4 osteophytectomy  and ACDF by Dr. Genao on 6/21/23 for oropharyngeal dysphagia.     7/5/23 visit: patient is 2 weeks out from surgery.  He had improved ability to swallow right after the procedure.  He said it was rough for about 4-5 days with swallowing things, but he is now eating normal foods.  He still has a hoarse voice.  He is unable to get a swallow evaluation until August.  He is only taking Tylenol for pain because he did not feel good on the oxycodone.  He endorses pain more on the right side of his neck and has issues with turning that way.  He has no other new complaints.  He has already started PT at [facility] and they are having him work on neck rotation, but only up to the point of pain.  He is very limited on rotation at this point.       8/7/23 Visit: Has a muscle on the right side of his neck that is tight and limits mobility and bothers him during sleep.  Going to PT.  Swallowing is improving.  He notes worsening tremors throughout his body. Will be discussing with neurology and PCP.  Patient and wife very appreciate of the service they have received at hospital and in clinic.      Currently, patient is about 1 year out from surgery. His chief concern relates to his Parkinson's, for which he recently received a TAMERA scan, associated with tremulousness and is currently following with Dr. Benavides and Melani with Neurology. He reports no dysphagia associated with the surgery. Working with speech therapy with regards to dysphonia.    Overall feels improved with regards to neck pain with surgery and physical therapy, except for some stiffness and pain when turning to the RIGHT localized to his lateral neck, most often experienced while driving. No radiating signs, patient feels it may localize to a neck joint. Patient endorses this was likely present BEFORE the surgery.     Endorses generalized weakness, sometimes legs give out and difficulty catching self. Inconsistent. Denies new numbness or tingling. Reports occasional  "urinary dribbling. Endorses constipation that had required disimpaction earlier in the year, denies zeynep bladder incontinence. Denies saddle anesthesia.       Physical Exam   /71 (BP Location: Right arm, Patient Position: Sitting)   Pulse 62   Resp 16   Ht 1.803 m (5' 11\")   Wt 123.4 kg (272 lb)   SpO2 95%   BMI 37.94 kg/m        Constitutional: Alert, no acute distress.  [place]:  ambulates with assist of a cane when knee aching; can walk around grocery store fine, around block with dog.    Neurological:  CN2-12 intact (uses hearing aids bilaterally)    Strength (L/R)  5/5 Deltoid  5/5 Bicep  5/5 Wrist Extensor  5/5 Tricep  5/5 Finger Flexion  5/5 Finger Abduction  5/5 Handgrip    5/5 Hip Flexion  5/5 Knee Extension  5/5 Ankle Dorsiflexion  5/5 Extensor Hallucis Longus  5/5 Plantar Flexion  5/5 Foot Eversion  5/5 Foot Inversion    Reflexes (L/R)  2+/2+ Brachioradialis  2+/2+ Patellar    Mild tremulousness in tongue, bilateral hands, shaking in feet; stiff slowed gait    Sensation: intact to light throughout except in LEFT ulnar distribution since surgery with Dr. Sidhu ~2018    Incision:  well healed - barely visible      IMAGING:    Imaging independently reviewed and discussed with Dr. Genao. Hardware stable with good alignment and no evidence of migration.    Narrative & Impression   XR CERVICAL SPINE 2/3 VIEWS  6/18/2024 10:21 AM       HISTORY: 1 yr s/p C 3-4 osteophytectomy, C3-4 ACDF; S/P cervical  spinal fusion     COMPARISON: Radiographs 9/26/2023, 3/3/2023     FINDINGS: Postsurgical changes C3-4 ACDF and osteophytectomy. There is  evidence of increased ankylosis particularly anteriorly compared to  9/26/2023. There also appears to be regrowth of an anterior inferior  endplate osteophyte of C3. Stable alignment. There are bridging  anterior osteophytes diffusely in the cervical spine and visualized  upper thoracic spine. Multilevel facet arthropathy. No prevertebral  soft tissue swelling. Lung apices " are relatively clear.                                                                      IMPRESSION:  1.  Increased ankylosis at C3-4 status post ACDF and osteophytectomy.  There has been regrowth of an anterior osteophyte off the inferior  endplate of C3 since 9/26/2023.  2. 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      I have personally reviewed the examination and initial interpretation  and I agree with the findings.     BRAD BERNAL MD         SYSTEM ID:  [id number]         ASSESSMENT:  Bird Kaur is a 72 year old male who is s/p C3/4 osteophytectomy and ACDF by Dr. Genao on 6/21/23 for oropharyngeal dysphagia. Patient is now approximately 1 year out from surgery and doing well.        PLAN:  - Follow-up as needed. No additional scheduled follow-up required.   - No activity restrictions    Patient is in agreement with this plan and states no further questions.        KARY ACHARYA MD  PGY1 Neurosurgery Intern    I have seen this patient with the resident and formulated a plan and agree with this note.  AMP

## 2024-07-28 ENCOUNTER — HEALTH MAINTENANCE LETTER (OUTPATIENT)
Age: 74
End: 2024-07-28

## 2025-08-10 ENCOUNTER — HEALTH MAINTENANCE LETTER (OUTPATIENT)
Age: 75
End: 2025-08-10

## (undated) DEVICE — SPONGE COTTONOID 1/2X1/2" 20-04S

## (undated) DEVICE — PREP POVIDONE-IODINE 7.5% SCRUB 4OZ BOTTLE MDS093945

## (undated) DEVICE — DRAPE STERI TOWEL SM 1000

## (undated) DEVICE — CLIP HORIZON MED BLUE 002200

## (undated) DEVICE — SU VICRYL 3-0 SH 8X18" UND J864D

## (undated) DEVICE — SOL NACL 0.9% IRRIG 1000ML BOTTLE 2F7124

## (undated) DEVICE — NDL BLUNT 15GA 1.5"

## (undated) DEVICE — SPONGE SURGIFOAM 100 1974

## (undated) DEVICE — SU MONOCRYL 4-0 PS-2 27" UND Y426H

## (undated) DEVICE — SPONGE KITTNER 30-101

## (undated) DEVICE — CATH TRAY FOLEY SURESTEP 16FR W/URNE MTR STLK LATEX A303316A

## (undated) DEVICE — DRAPE POUCH INSTRUMENT 1018

## (undated) DEVICE — DRAPE SHEET REV FOLD 3/4 9349

## (undated) DEVICE — CLIP HORIZON SM RED WIDE SLOT 001201

## (undated) DEVICE — PAD CHUX UNDERPAD 23X24" 7136

## (undated) DEVICE — Device

## (undated) DEVICE — DECANTER VIAL 2006S

## (undated) DEVICE — DRAPE MICROSCOPE LEICA 54X120" 09-MK653

## (undated) DEVICE — LINEN TOWEL PACK X30 5481

## (undated) DEVICE — WIPES FOLEY CARE SURESTEP PROVON DFC100

## (undated) DEVICE — ESU GROUND PAD ADULT W/CORD E7507

## (undated) DEVICE — PACK NEURO MINOR UMMC SNE32MNMU4

## (undated) DEVICE — GLOVE BIOGEL PI MICRO INDICATOR UNDERGLOVE SZ 7.0 48970

## (undated) DEVICE — SU MONOCRYL 3-0 PS-1 27" Y936H

## (undated) DEVICE — GLOVE BIOGEL PI MICRO SZ 7.0 48570

## (undated) DEVICE — PREP POVIDONE-IODINE 10% SOLUTION 4OZ BOTTLE MDS093944

## (undated) DEVICE — RETR ELASTIC STAYS LONE STAR BLUNT DUAL LEAD 3550-1G

## (undated) DEVICE — PACK GOWN 3/PK DISP XL SBA32GPFCB

## (undated) DEVICE — SU DERMABOND ADVANCED .7ML DNX12

## (undated) DEVICE — SYR 30ML LL W/O NDL 302832

## (undated) DEVICE — PREP CHLORAPREP CLEAR 3ML 930400

## (undated) DEVICE — TUBE ENDOTRACHEAL NIM TRIVANTAGE 7.0MM 8229707

## (undated) DEVICE — SU VICRYL 3-0 SH CR 8X18" J774

## (undated) DEVICE — DRAPE C-ARM W/STRAPS 42X72" 07-CA104

## (undated) DEVICE — BUR STRK CARBIDE MATCH HEAD 3.0MM 5820-107-530C

## (undated) DEVICE — SUCTION MANIFOLD NEPTUNE 2 SYS 4 PORT 0702-020-000

## (undated) DEVICE — LINEN TOWEL PACK X6 WHITE 5487

## (undated) DEVICE — SYR 10ML LL W/O NDL 302995

## (undated) DEVICE — SPONGE SURGIFOAM 01GM POWDER 1978

## (undated) DEVICE — SU SILK 2-0 SH CR 5X18" C0125

## (undated) DEVICE — IOM SUPPLIES/CASE FEE

## (undated) DEVICE — SURGICEL HEMOSTAT 4X8" 1952

## (undated) DEVICE — PACK SET-UP STD 9102

## (undated) DEVICE — SOL WATER IRRIG 1000ML BOTTLE 2F7114

## (undated) DEVICE — NDL SPINAL 18GA 3.5" 405184

## (undated) DEVICE — PREP SKIN SCRUB TRAY 4461A

## (undated) RX ORDER — GLYCOPYRROLATE 0.2 MG/ML
INJECTION, SOLUTION INTRAMUSCULAR; INTRAVENOUS
Status: DISPENSED
Start: 2023-06-21

## (undated) RX ORDER — LIDOCAINE HYDROCHLORIDE AND EPINEPHRINE 10; 10 MG/ML; UG/ML
INJECTION, SOLUTION INFILTRATION; PERINEURAL
Status: DISPENSED
Start: 2023-06-21

## (undated) RX ORDER — ACETAMINOPHEN 325 MG/1
TABLET ORAL
Status: DISPENSED
Start: 2023-06-21

## (undated) RX ORDER — PROPOFOL 10 MG/ML
INJECTION, EMULSION INTRAVENOUS
Status: DISPENSED
Start: 2023-06-21

## (undated) RX ORDER — HYDRALAZINE HYDROCHLORIDE 20 MG/ML
INJECTION INTRAMUSCULAR; INTRAVENOUS
Status: DISPENSED
Start: 2023-06-21

## (undated) RX ORDER — FENTANYL CITRATE 50 UG/ML
INJECTION, SOLUTION INTRAMUSCULAR; INTRAVENOUS
Status: DISPENSED
Start: 2023-06-21

## (undated) RX ORDER — ONDANSETRON 2 MG/ML
INJECTION INTRAMUSCULAR; INTRAVENOUS
Status: DISPENSED
Start: 2023-06-21

## (undated) RX ORDER — LIDOCAINE HYDROCHLORIDE 20 MG/ML
SOLUTION OROPHARYNGEAL
Status: DISPENSED
Start: 2023-01-03

## (undated) RX ORDER — DEXAMETHASONE SODIUM PHOSPHATE 4 MG/ML
INJECTION, SOLUTION INTRA-ARTICULAR; INTRALESIONAL; INTRAMUSCULAR; INTRAVENOUS; SOFT TISSUE
Status: DISPENSED
Start: 2023-06-21

## (undated) RX ORDER — METOPROLOL TARTRATE 50 MG
TABLET ORAL
Status: DISPENSED
Start: 2023-06-21

## (undated) RX ORDER — CLINDAMYCIN PHOSPHATE 900 MG/50ML
INJECTION, SOLUTION INTRAVENOUS
Status: DISPENSED
Start: 2023-06-21

## (undated) RX ORDER — FENTANYL CITRATE-0.9 % NACL/PF 10 MCG/ML
PLASTIC BAG, INJECTION (ML) INTRAVENOUS
Status: DISPENSED
Start: 2023-06-21

## (undated) RX ORDER — GABAPENTIN 100 MG/1
CAPSULE ORAL
Status: DISPENSED
Start: 2023-06-21